# Patient Record
Sex: MALE | Race: WHITE
[De-identification: names, ages, dates, MRNs, and addresses within clinical notes are randomized per-mention and may not be internally consistent; named-entity substitution may affect disease eponyms.]

---

## 2017-04-06 ENCOUNTER — HOSPITAL ENCOUNTER (OUTPATIENT)
Dept: HOSPITAL 47 - LABWHC1 | Age: 72
Discharge: HOME | End: 2017-04-06
Attending: NURSE PRACTITIONER
Payer: MEDICARE

## 2017-04-06 DIAGNOSIS — E21.3: ICD-10-CM

## 2017-04-06 DIAGNOSIS — N39.0: ICD-10-CM

## 2017-04-06 DIAGNOSIS — M10.9: ICD-10-CM

## 2017-04-06 DIAGNOSIS — D64.9: ICD-10-CM

## 2017-04-06 DIAGNOSIS — N18.3: Primary | ICD-10-CM

## 2017-04-06 LAB
ANION GAP SERPL CALC-SCNC: 11 MMOL/L
BASOPHILS # BLD AUTO: 0.1 K/UL (ref 0–0.2)
BASOPHILS NFR BLD AUTO: 1 %
BUN SERPL-SCNC: 34 MG/DL (ref 9–20)
CALCIUM SPEC-MCNC: 8.8 MG/DL (ref 8.4–10.2)
CH: 32.9
CHCM: 31.5
CHLORIDE SERPL-SCNC: 106 MMOL/L (ref 98–107)
CO2 SERPL-SCNC: 29 MMOL/L (ref 22–30)
EOSINOPHIL # BLD AUTO: 0.3 K/UL (ref 0–0.7)
EOSINOPHIL NFR BLD AUTO: 4 %
ERYTHROCYTE [DISTWIDTH] IN BLOOD BY AUTOMATED COUNT: 3.83 M/UL (ref 4.3–5.9)
ERYTHROCYTE [DISTWIDTH] IN BLOOD: 15.9 % (ref 11.5–15.5)
GLUCOSE SERPL-MCNC: 145 MG/DL (ref 74–99)
HCT VFR BLD AUTO: 40.4 % (ref 39–53)
HDW: 2.98
HGB BLD-MCNC: 13.1 GM/DL (ref 13–17.5)
IRON SERPL-MCNC: 116 UG/DL (ref 49–181)
LUC NFR BLD AUTO: 4 %
LYMPHOCYTES # SPEC AUTO: 2.5 K/UL (ref 1–4.8)
LYMPHOCYTES NFR SPEC AUTO: 28 %
MAGNESIUM SPEC-SCNC: 2 MG/DL (ref 1.6–2.3)
MCH RBC QN AUTO: 34.2 PG (ref 25–35)
MCHC RBC AUTO-ENTMCNC: 32.4 G/DL (ref 31–37)
MCV RBC AUTO: 105.3 FL (ref 80–100)
MONOCYTES # BLD AUTO: 0.4 K/UL (ref 0–1)
MONOCYTES NFR BLD AUTO: 5 %
NEUTROPHILS # BLD AUTO: 5.5 K/UL (ref 1.3–7.7)
NEUTROPHILS NFR BLD AUTO: 60 %
NON-AFRICAN AMERICAN GFR(MDRD): 46
PH UR: 5.5 [PH] (ref 5–8)
PHOSPHATE SERPL-MCNC: 4 MG/DL (ref 2.5–4.5)
POTASSIUM SERPL-SCNC: 4.7 MMOL/L (ref 3.5–5.1)
SODIUM SERPL-SCNC: 146 MMOL/L (ref 137–145)
SP GR UR: 1.02 (ref 1–1.03)
TIBC SERPL-MCNC: 326 UG/DL (ref 261–462)
UA BILLING (MACRO VS. MICRO): (no result)
URATE SERPL-MCNC: 4.6 MG/DL (ref 3.5–8.5)
UROBILINOGEN UR QL STRIP: <2 MG/DL (ref ?–2)
WBC # BLD AUTO: 0.33 10*3/UL
WBC # BLD AUTO: 9.2 K/UL (ref 3.8–10.6)
WBC (PEROX): 9.73

## 2017-04-06 PROCEDURE — 85025 COMPLETE CBC W/AUTO DIFF WBC: CPT

## 2017-04-06 PROCEDURE — 82306 VITAMIN D 25 HYDROXY: CPT

## 2017-04-06 PROCEDURE — 84100 ASSAY OF PHOSPHORUS: CPT

## 2017-04-06 PROCEDURE — 81003 URINALYSIS AUTO W/O SCOPE: CPT

## 2017-04-06 PROCEDURE — 82728 ASSAY OF FERRITIN: CPT

## 2017-04-06 PROCEDURE — 83970 ASSAY OF PARATHORMONE: CPT

## 2017-04-06 PROCEDURE — 36415 COLL VENOUS BLD VENIPUNCTURE: CPT

## 2017-04-06 PROCEDURE — 83550 IRON BINDING TEST: CPT

## 2017-04-06 PROCEDURE — 83540 ASSAY OF IRON: CPT

## 2017-04-06 PROCEDURE — 83735 ASSAY OF MAGNESIUM: CPT

## 2017-04-06 PROCEDURE — 84550 ASSAY OF BLOOD/URIC ACID: CPT

## 2017-04-06 PROCEDURE — 80048 BASIC METABOLIC PNL TOTAL CA: CPT

## 2018-03-08 ENCOUNTER — HOSPITAL ENCOUNTER (OUTPATIENT)
Dept: HOSPITAL 47 - LABWHC1 | Age: 73
Discharge: HOME | End: 2018-03-08
Attending: NURSE PRACTITIONER
Payer: MEDICARE

## 2018-03-08 DIAGNOSIS — M10.9: ICD-10-CM

## 2018-03-08 DIAGNOSIS — N18.3: Primary | ICD-10-CM

## 2018-03-08 DIAGNOSIS — D64.9: ICD-10-CM

## 2018-03-08 DIAGNOSIS — E21.3: ICD-10-CM

## 2018-03-08 DIAGNOSIS — N39.0: ICD-10-CM

## 2018-03-08 LAB
ANION GAP SERPL CALC-SCNC: 11 MMOL/L
BASOPHILS # BLD AUTO: 0.1 K/UL (ref 0–0.2)
BASOPHILS NFR BLD AUTO: 1 %
BUN SERPL-SCNC: 48 MG/DL (ref 9–20)
CALCIUM SPEC-MCNC: 8.7 MG/DL (ref 8.4–10.2)
CHLORIDE SERPL-SCNC: 104 MMOL/L (ref 98–107)
CO2 SERPL-SCNC: 30 MMOL/L (ref 22–30)
EOSINOPHIL # BLD AUTO: 0.3 K/UL (ref 0–0.7)
EOSINOPHIL NFR BLD AUTO: 4 %
ERYTHROCYTE [DISTWIDTH] IN BLOOD BY AUTOMATED COUNT: 3.95 M/UL (ref 4.3–5.9)
ERYTHROCYTE [DISTWIDTH] IN BLOOD: 15 % (ref 11.5–15.5)
GLUCOSE SERPL-MCNC: 119 MG/DL (ref 74–99)
HCT VFR BLD AUTO: 40 % (ref 39–53)
HGB BLD-MCNC: 13.3 GM/DL (ref 13–17.5)
LYMPHOCYTES # SPEC AUTO: 2.5 K/UL (ref 1–4.8)
LYMPHOCYTES NFR SPEC AUTO: 31 %
MCH RBC QN AUTO: 33.6 PG (ref 25–35)
MCHC RBC AUTO-ENTMCNC: 33.2 G/DL (ref 31–37)
MCV RBC AUTO: 101.3 FL (ref 80–100)
MONOCYTES # BLD AUTO: 0.4 K/UL (ref 0–1)
MONOCYTES NFR BLD AUTO: 5 %
NEUTROPHILS # BLD AUTO: 4.4 K/UL (ref 1.3–7.7)
NEUTROPHILS NFR BLD AUTO: 55 %
PH UR: 5.5 [PH] (ref 5–8)
PLATELET # BLD AUTO: 256 K/UL (ref 150–450)
POTASSIUM SERPL-SCNC: 4.5 MMOL/L (ref 3.5–5.1)
PTH-INTACT SERPL-MCNC: 149.8 PG/ML (ref 14–72)
SODIUM SERPL-SCNC: 145 MMOL/L (ref 137–145)
SP GR UR: 1.02 (ref 1–1.03)
URATE SERPL-MCNC: 5.1 MG/DL (ref 3.5–8.5)
UROBILINOGEN UR QL STRIP: <2 MG/DL (ref ?–2)
WBC # BLD AUTO: 8 K/UL (ref 3.8–10.6)

## 2018-03-08 PROCEDURE — 84100 ASSAY OF PHOSPHORUS: CPT

## 2018-03-08 PROCEDURE — 81003 URINALYSIS AUTO W/O SCOPE: CPT

## 2018-03-08 PROCEDURE — 80048 BASIC METABOLIC PNL TOTAL CA: CPT

## 2018-03-08 PROCEDURE — 36415 COLL VENOUS BLD VENIPUNCTURE: CPT

## 2018-03-08 PROCEDURE — 84550 ASSAY OF BLOOD/URIC ACID: CPT

## 2018-03-08 PROCEDURE — 83970 ASSAY OF PARATHORMONE: CPT

## 2018-03-08 PROCEDURE — 82728 ASSAY OF FERRITIN: CPT

## 2018-03-08 PROCEDURE — 83735 ASSAY OF MAGNESIUM: CPT

## 2018-03-08 PROCEDURE — 85025 COMPLETE CBC W/AUTO DIFF WBC: CPT

## 2018-03-08 PROCEDURE — 83550 IRON BINDING TEST: CPT

## 2018-03-08 PROCEDURE — 82306 VITAMIN D 25 HYDROXY: CPT

## 2018-03-08 PROCEDURE — 83540 ASSAY OF IRON: CPT

## 2018-04-11 ENCOUNTER — HOSPITAL ENCOUNTER (OUTPATIENT)
Dept: HOSPITAL 47 - OR | Age: 73
Discharge: HOME | End: 2018-04-11
Attending: OPHTHALMOLOGY
Payer: MEDICARE

## 2018-04-11 VITALS — BODY MASS INDEX: 35.5 KG/M2

## 2018-04-11 VITALS — RESPIRATION RATE: 16 BRPM | TEMPERATURE: 97 F

## 2018-04-11 VITALS — SYSTOLIC BLOOD PRESSURE: 149 MMHG | HEART RATE: 66 BPM | DIASTOLIC BLOOD PRESSURE: 79 MMHG

## 2018-04-11 DIAGNOSIS — Z79.84: ICD-10-CM

## 2018-04-11 DIAGNOSIS — E07.9: ICD-10-CM

## 2018-04-11 DIAGNOSIS — E78.5: ICD-10-CM

## 2018-04-11 DIAGNOSIS — H25.11: Primary | ICD-10-CM

## 2018-04-11 DIAGNOSIS — Z79.890: ICD-10-CM

## 2018-04-11 DIAGNOSIS — I25.2: ICD-10-CM

## 2018-04-11 DIAGNOSIS — N40.0: ICD-10-CM

## 2018-04-11 DIAGNOSIS — Z95.810: ICD-10-CM

## 2018-04-11 DIAGNOSIS — H43.813: ICD-10-CM

## 2018-04-11 DIAGNOSIS — H52.13: ICD-10-CM

## 2018-04-11 DIAGNOSIS — I11.9: ICD-10-CM

## 2018-04-11 DIAGNOSIS — H91.91: ICD-10-CM

## 2018-04-11 DIAGNOSIS — E11.9: ICD-10-CM

## 2018-04-11 DIAGNOSIS — Z79.01: ICD-10-CM

## 2018-04-11 DIAGNOSIS — I48.91: ICD-10-CM

## 2018-04-11 DIAGNOSIS — H25.041: ICD-10-CM

## 2018-04-11 DIAGNOSIS — Z79.899: ICD-10-CM

## 2018-04-11 DIAGNOSIS — K21.9: ICD-10-CM

## 2018-04-11 DIAGNOSIS — I25.10: ICD-10-CM

## 2018-04-11 DIAGNOSIS — Z87.891: ICD-10-CM

## 2018-04-11 LAB — GLUCOSE BLD-MCNC: 112 MG/DL (ref 75–99)

## 2018-04-11 RX ADMIN — PHENYLEPHRINE HYDROCHLORIDE NR DROPS: 25 SOLUTION/ DROPS OPHTHALMIC at 07:35

## 2018-04-11 RX ADMIN — TIMOLOL MALEATE ONE DROPS: 5 SOLUTION OPHTHALMIC at 08:14

## 2018-04-11 RX ADMIN — CYCLOPENTOLATE HYDROCHLORIDE ONE DROPS: 10 SOLUTION/ DROPS OPHTHALMIC at 07:11

## 2018-04-11 RX ADMIN — CYCLOPENTOLATE HYDROCHLORIDE ONE DROPS: 10 SOLUTION/ DROPS OPHTHALMIC at 07:00

## 2018-04-11 RX ADMIN — CYCLOPENTOLATE HYDROCHLORIDE ONE DROPS: 10 SOLUTION/ DROPS OPHTHALMIC at 07:25

## 2018-04-11 RX ADMIN — PHENYLEPHRINE HYDROCHLORIDE NR DROPS: 25 SOLUTION/ DROPS OPHTHALMIC at 07:03

## 2018-04-11 RX ADMIN — MOXIFLOXACIN ONE DROPS: 5 SOLUTION/ DROPS OPHTHALMIC at 08:29

## 2018-04-11 RX ADMIN — MOXIFLOXACIN ONE DROPS: 5 SOLUTION/ DROPS OPHTHALMIC at 08:13

## 2018-04-11 RX ADMIN — PHENYLEPHRINE HYDROCHLORIDE NR DROPS: 25 SOLUTION/ DROPS OPHTHALMIC at 07:17

## 2018-04-11 RX ADMIN — TIMOLOL MALEATE ONE DROPS: 5 SOLUTION OPHTHALMIC at 08:29

## 2018-04-11 NOTE — P.OP
Date of Procedure: 04/11/18


Preoperative Diagnosis: 


NS & PSC


Postoperative Diagnosis: 


same


Procedure(s) Performed: 


PIOL< OD


Implants: 


PCB00 22.00


Anesthesia: MAC


Surgeon: Nikita Dickerson


Estimated Blood Loss (ml): 0


Pathology: none sent


Condition: stable


Disposition: same day


Indications for Procedure: 


blurry vision


Operative Findings: 


No complications

## 2018-04-11 NOTE — OP
OPERATIVE REPORT



DATE OF SURGERY:

April 11, 2018.



OPERATION:

Phacoemulsification of cataract and intraocular lens implant of the right eye.



PREOPERATIVE DIAGNOSES:

Nuclear sclerosis. Posterior subcapsular cataract



POSTOPERATIVE DIAGNOSES:

Nuclear sclerosis.  Posterior subcapsular cataract.



OPERATION::

Clear cornea phacoemulsification of cataract right OD eye.



ESTIMATED BLOOD LOSS::

Zero.



SPECIMEN TAKEN::

None.



NARRATIVE::

After obtaining the appropriate consent, the patient was brought to the Operating Room

where the patient was placed under cardiac monitoring and prepped and draped in the

usual sterile manner.  At the 11 o'clock position a 15 degree super sharp blade was

used to create a paracentesis followed by instillation of 1% Xylocaine MPF 50:50 mix

with BSS into the anterior chamber.  This was followed by  Amvisc to stabilize the

anterior chamber.  At the  9 o'clock position a self-sealing corneal flap incision was

created using 2.8 mm gagandeep keratome.  A cystatome was used to initiate a continuous

tear capsulorrhexis which was completed with the Utrata forceps.  A Binkhorst cannula

was used to hydrodissect the lens nucleus followed by hydrodelineation.

Phacoemulsification of the lens was performed utilizing phacochop in 12.66 Seconds at

12 % power.  The remaining cortical material was removed using the irrigation

aspiration mode followed by additional 1% Xylocaine MPF into the anterior chamber

followed by viscoelastic to stabilize the capsular bag.  An JOHANNA PCB 00 22.0 diopter

posterior chamber lens was placed into the capsular bag without difficulty.  The

remaining viscoelastic material was removed from the anterior chamber with the

irrigation/aspiration.  Balanced salt solution was used to normalize the intraocular

pressure.  The incision was checked for watertight integrity.  The patient then

received two drops of 0.5% timolol followed by two drops Vigamox, was lightly patched

and shielded in the usual manner.  There were no complications from the procedure.  The

patient tolerated the procedure well and was returned to recovery in good condition.





MMODL / IJN: [id number] / Job#: [id number]

## 2018-05-21 ENCOUNTER — HOSPITAL ENCOUNTER (OUTPATIENT)
Dept: HOSPITAL 47 - RADECHMAIN | Age: 73
Discharge: HOME | End: 2018-05-21
Attending: FAMILY MEDICINE
Payer: MEDICARE

## 2018-05-21 DIAGNOSIS — I27.20: ICD-10-CM

## 2018-05-21 DIAGNOSIS — R42: ICD-10-CM

## 2018-05-21 DIAGNOSIS — I49.9: ICD-10-CM

## 2018-05-21 DIAGNOSIS — I08.3: Primary | ICD-10-CM

## 2018-05-21 PROCEDURE — 93880 EXTRACRANIAL BILAT STUDY: CPT

## 2018-05-21 PROCEDURE — 93306 TTE W/DOPPLER COMPLETE: CPT

## 2018-05-22 NOTE — ECHOF
Referral Reason:R42 dizziness, I49.9 arrythmia



MEASUREMENTS

--------

HEIGHT: 177.8 cm

WEIGHT: 115.2 kg

BP: 134/81

RVIDd:   3.3 cm     (< 3.3)

IVSd:   1.3 cm     (0.6 - 1.1)

LVIDd:   6.9 cm     (3.9 - 5.3)

LVPWd:   1.3 cm     (0.6 - 1.1)

IVSs:   1.7 cm

LVIDs:   6.0 cm

LVPWs:   1.6 cm

LAESV Index (A-L):   58.67 ml/m

Ao Diam:   3.9 cm     (2.0 - 3.7)

AV Cusp:   1.8 cm     (1.5 - 2.6)

LA Diam:   4.4 cm     (2.7 - 3.8)

MV E Shay:   1.10 m/s

MV DecT:   181 ms

MV A Shay:   0.36 m/s

MV E/A Ratio:   3.01 

RAP:   5.00 mmHg

RVSP:   50.65 mmHg







FINDINGS

--------

Undetermined rhythm.

This was a technically difficult study with suboptimal views.

The left ventricle is severely dilated.   There is mild concentric left ventricular hypertrophy.   Th
ere is severe global hypokinesis of LV .   Overall left ventricular systolic function is severely imp
aired with, an EF < 20%.

The right ventricle is mildly enlarged.

LA is severely dilated >40 ml/m2

RA appears enlarged.

3ml of Lumason was utilized for enhancement of images.

There is mild aortic valve sclerosis.   Trace amount of aortic regurgitation.    There is no evidence
 of aortic stenosis.

The mitral valve leaflets are mildly thickened.   Mild mitral annular calcification present.   Modera
te mitral regurgitation is present.

Mild tricuspid regurgitation present.   There is mild pulmonary hypertension.   The right ventricular
 systolic pressure, as measured by Doppler, is 50.65mmHg.

Trace/mild (physiologic)  pulmonic regurgitation.

The aortic root size is normal.

Normal inferior vena cava with less than 50% inspiratory collapse consistent with estimated right atr
ial pressure of 15 mmHg.

There is no pericardial effusion.



CONCLUSIONS

--------

1. Undetermined rhythm.

2. This was a technically difficult study with suboptimal views.

3. The left ventricle is severely dilated.

4. There is mild concentric left ventricular hypertrophy.

5. There is severe global hypokinesis of LV .

6. Overall left ventricular systolic function is severely impaired with, an EF < 20%.

7. The right ventricle is mildly enlarged.

8. LA is severely dilated >40 ml/m2

9. RA appears enlarged.

10. 3ml of Lumason was utilized for enhancement of images.

11. There is mild aortic valve sclerosis.

12. Trace amount of aortic regurgitation.

13. The mitral valve leaflets are mildly thickened.

14. Mild mitral annular calcification present.

15. Moderate mitral regurgitation is present.

16. Mild tricuspid regurgitation present.

17. There is mild pulmonary hypertension.

18. The right ventricular systolic pressure, as measured by Doppler, is 50.65mmHg.

19. Trace/mild (physiologic)  pulmonic regurgitation.

20. The aortic root size is normal.

21. Normal inferior vena cava with less than 50% inspiratory collapse consistent with estimated right
 atrial pressure of 15 mmHg.

22. There is no pericardial effusion.





SONOGRAPHER: Tony Ayala RDCS

## 2018-05-22 NOTE — US
EXAMINATION TYPE: US carotid duplex BILAT

 

DATE OF EXAM: 5/21/2018

 

COMPARISON: NONE

 

CLINICAL HISTORY: R42 dizziness, I49.9 arrythmia. Dizziness, abnormal gait

 

EXAM MEASUREMENTS: 

 

RIGHT:  Peak Systolic Velocity (PSV) cm/sec

----- Right CCA:  41.8  

---- Right ICA:  70.2  

----- Right ECA:  57.5

ICA/CCA ratio:  1.7  

 

RIGHT:  End Diastole cm/sec

----- Right CCA:  13.0

----- Right ICA:  31.8   

----- Right ECA:  6.9   

 

LEFT:  Peak Systolic Velocity (PSV) cm/sec

----- Left CCA:  58.8

----- Left ICA:  85.6

----- Left ECA:  58.4

ICA/CCA ratio:  1.5

 

LEFT:  End Diastole cm/sec

----- Left CCA:  20.1

----- Left ICA:  36.2

----- Left ECA:  9.5

 

VERTEBRALS (direction of flow):

Right Vertebral: Antegrade

Left Vertebral: Antegrade

 

Rhythm:  Arrhythmia

 

Mild Heterogeneous plaque carotid bifurcations bilaterally with no significant stenosis seen.

 

 

 

IMPRESSION:  

1. No suspicious elevated velocity to suggest significant flow-limiting stenosis.

2. Note is made of cardiac arrhythmia during the examination.   

 

 

Criteria for Assigning % of Stenosis / Diameter reduction

(Estimation based on the indirect measurements of the internal carotid artery velocities (ICA PSV).

1.  Normal (no stenosis)=ICA PSV < 125 cm/s: ratio < 2.0: ICA EDV<40 cm/s.

2. Less than 50% stenosis=ICA PSV < 125 cm/s: ratio < 2.0: ICA EDV<40 cm/s.

3.  50 to 69% stenosis=ICA PSV of 125 to 230 cm/s: ration 2.0 ? 4.0: ICA EDV  cm/s.

4.  Greater than 70% stenosis to near occlusion= ICA PSV > 230 cm/s: ratio > 4.0: ICA EDV > 100 cm/s.
 

5.  Near occlusion= ICA PSV velocities may be low or undetectable: variable ratio and ICA EDV.

6.  Total occlusion=unable to detect flow.

## 2018-07-03 ENCOUNTER — HOSPITAL ENCOUNTER (OUTPATIENT)
Dept: HOSPITAL 47 - RADCTMAIN | Age: 73
Discharge: HOME | End: 2018-07-03
Payer: MEDICARE

## 2018-07-03 DIAGNOSIS — G31.1: Primary | ICD-10-CM

## 2018-07-03 PROCEDURE — 70450 CT HEAD/BRAIN W/O DYE: CPT

## 2018-07-03 NOTE — CT
EXAMINATION TYPE: CT brain wo con

 

DATE OF EXAM: 7/3/2018

 

COMPARISON: NONE

 

HISTORY: Tremors and weakness

 

CT DLP: 1242 mGycm

Automated exposure control for dose reduction was used.

 

FINDINGS: 

Nasal septal deviation noted.

Intracranial atherosclerotic changes noted.

Mild to moderate generalized degenerative change.

No acute hemorrhage or mass effect.

Calvarium intact. Changes of chronic sinusitis with severe changes involving the maxillary sinus note
d on the right.

 

IMPRESSION: 

MILD TO MODERATE DEGENERATIVE CHANGES.

## 2018-07-09 ENCOUNTER — HOSPITAL ENCOUNTER (OUTPATIENT)
Dept: HOSPITAL 47 - LABWHC1 | Age: 73
Discharge: HOME | End: 2018-07-09
Payer: MEDICARE

## 2018-07-09 DIAGNOSIS — E55.9: ICD-10-CM

## 2018-07-09 DIAGNOSIS — N18.3: ICD-10-CM

## 2018-07-09 DIAGNOSIS — N39.0: ICD-10-CM

## 2018-07-09 DIAGNOSIS — E21.3: ICD-10-CM

## 2018-07-09 DIAGNOSIS — N25.81: Primary | ICD-10-CM

## 2018-07-09 DIAGNOSIS — M10.9: ICD-10-CM

## 2018-07-09 DIAGNOSIS — D63.1: ICD-10-CM

## 2018-07-09 LAB
ALBUMIN SERPL-MCNC: 4.1 G/DL (ref 3.5–5)
ANION GAP SERPL CALC-SCNC: 11 MMOL/L
BASOPHILS # BLD AUTO: 0 K/UL (ref 0–0.2)
BASOPHILS NFR BLD AUTO: 1 %
BUN SERPL-SCNC: 47 MG/DL (ref 9–20)
CALCIUM SPEC-MCNC: 9.2 MG/DL (ref 8.4–10.2)
CHLORIDE SERPL-SCNC: 102 MMOL/L (ref 98–107)
CO2 SERPL-SCNC: 34 MMOL/L (ref 22–30)
EOSINOPHIL # BLD AUTO: 0.2 K/UL (ref 0–0.7)
EOSINOPHIL NFR BLD AUTO: 2 %
ERYTHROCYTE [DISTWIDTH] IN BLOOD BY AUTOMATED COUNT: 4.22 M/UL (ref 4.3–5.9)
ERYTHROCYTE [DISTWIDTH] IN BLOOD: 15.2 % (ref 11.5–15.5)
GLUCOSE SERPL-MCNC: 120 MG/DL (ref 74–99)
HCT VFR BLD AUTO: 43.1 % (ref 39–53)
HGB BLD-MCNC: 13.9 GM/DL (ref 13–17.5)
LYMPHOCYTES # SPEC AUTO: 2.8 K/UL (ref 1–4.8)
LYMPHOCYTES NFR SPEC AUTO: 32 %
MAGNESIUM SPEC-SCNC: 2.1 MG/DL (ref 1.6–2.3)
MCH RBC QN AUTO: 33 PG (ref 25–35)
MCHC RBC AUTO-ENTMCNC: 32.3 G/DL (ref 31–37)
MCV RBC AUTO: 102.1 FL (ref 80–100)
MONOCYTES # BLD AUTO: 0.5 K/UL (ref 0–1)
MONOCYTES NFR BLD AUTO: 5 %
NEUTROPHILS # BLD AUTO: 5 K/UL (ref 1.3–7.7)
NEUTROPHILS NFR BLD AUTO: 57 %
PH UR: 5.5 [PH] (ref 5–8)
PLATELET # BLD AUTO: 223 K/UL (ref 150–450)
POTASSIUM SERPL-SCNC: 4.2 MMOL/L (ref 3.5–5.1)
SODIUM SERPL-SCNC: 147 MMOL/L (ref 137–145)
SP GR UR: 1.01 (ref 1–1.03)
URATE SERPL-MCNC: 4.8 MG/DL (ref 3.5–8.5)
UROBILINOGEN UR QL STRIP: <2 MG/DL (ref ?–2)
WBC # BLD AUTO: 8.8 K/UL (ref 3.8–10.6)

## 2018-07-09 PROCEDURE — 81003 URINALYSIS AUTO W/O SCOPE: CPT

## 2018-07-09 PROCEDURE — 82040 ASSAY OF SERUM ALBUMIN: CPT

## 2018-07-09 PROCEDURE — 85025 COMPLETE CBC W/AUTO DIFF WBC: CPT

## 2018-07-09 PROCEDURE — 83735 ASSAY OF MAGNESIUM: CPT

## 2018-07-09 PROCEDURE — 84550 ASSAY OF BLOOD/URIC ACID: CPT

## 2018-07-09 PROCEDURE — 82306 VITAMIN D 25 HYDROXY: CPT

## 2018-07-09 PROCEDURE — 83550 IRON BINDING TEST: CPT

## 2018-07-09 PROCEDURE — 83540 ASSAY OF IRON: CPT

## 2018-07-09 PROCEDURE — 82728 ASSAY OF FERRITIN: CPT

## 2018-07-09 PROCEDURE — 36415 COLL VENOUS BLD VENIPUNCTURE: CPT

## 2018-07-09 PROCEDURE — 80048 BASIC METABOLIC PNL TOTAL CA: CPT

## 2018-07-09 PROCEDURE — 83970 ASSAY OF PARATHORMONE: CPT

## 2018-07-09 PROCEDURE — 84100 ASSAY OF PHOSPHORUS: CPT

## 2018-07-10 LAB — PTH-INTACT SERPL-MCNC: 78.6 PG/ML (ref 14–72)

## 2018-11-15 ENCOUNTER — HOSPITAL ENCOUNTER (INPATIENT)
Dept: HOSPITAL 47 - EC | Age: 73
LOS: 4 days | Discharge: HOME | DRG: 291 | End: 2018-11-19
Attending: FAMILY MEDICINE | Admitting: FAMILY MEDICINE
Payer: MEDICARE

## 2018-11-15 DIAGNOSIS — K21.9: ICD-10-CM

## 2018-11-15 DIAGNOSIS — R09.02: ICD-10-CM

## 2018-11-15 DIAGNOSIS — N17.9: ICD-10-CM

## 2018-11-15 DIAGNOSIS — Z95.810: ICD-10-CM

## 2018-11-15 DIAGNOSIS — E11.22: ICD-10-CM

## 2018-11-15 DIAGNOSIS — E07.9: ICD-10-CM

## 2018-11-15 DIAGNOSIS — I50.23: ICD-10-CM

## 2018-11-15 DIAGNOSIS — I25.10: ICD-10-CM

## 2018-11-15 DIAGNOSIS — E83.89: ICD-10-CM

## 2018-11-15 DIAGNOSIS — Z79.890: ICD-10-CM

## 2018-11-15 DIAGNOSIS — I25.2: ICD-10-CM

## 2018-11-15 DIAGNOSIS — J20.9: ICD-10-CM

## 2018-11-15 DIAGNOSIS — E78.5: ICD-10-CM

## 2018-11-15 DIAGNOSIS — Z79.84: ICD-10-CM

## 2018-11-15 DIAGNOSIS — I42.9: ICD-10-CM

## 2018-11-15 DIAGNOSIS — I13.0: Primary | ICD-10-CM

## 2018-11-15 DIAGNOSIS — T50.2X5A: ICD-10-CM

## 2018-11-15 DIAGNOSIS — N40.0: ICD-10-CM

## 2018-11-15 DIAGNOSIS — N18.3: ICD-10-CM

## 2018-11-15 DIAGNOSIS — F41.9: ICD-10-CM

## 2018-11-15 DIAGNOSIS — Z79.01: ICD-10-CM

## 2018-11-15 DIAGNOSIS — Z96.1: ICD-10-CM

## 2018-11-15 DIAGNOSIS — Z98.42: ICD-10-CM

## 2018-11-15 DIAGNOSIS — J44.0: ICD-10-CM

## 2018-11-15 DIAGNOSIS — J18.9: ICD-10-CM

## 2018-11-15 DIAGNOSIS — Z79.899: ICD-10-CM

## 2018-11-15 DIAGNOSIS — Z87.891: ICD-10-CM

## 2018-11-15 DIAGNOSIS — Z95.5: ICD-10-CM

## 2018-11-15 LAB
ALBUMIN SERPL-MCNC: 4.3 G/DL (ref 3.5–5)
ALP SERPL-CCNC: 91 U/L (ref 38–126)
ALT SERPL-CCNC: 36 U/L (ref 21–72)
ANION GAP SERPL CALC-SCNC: 10 MMOL/L
APTT BLD: 28.4 SEC (ref 22–30)
AST SERPL-CCNC: 24 U/L (ref 17–59)
BUN SERPL-SCNC: 49 MG/DL (ref 9–20)
CALCIUM SPEC-MCNC: 9.2 MG/DL (ref 8.4–10.2)
CELLS COUNTED: 100
CHLORIDE SERPL-SCNC: 103 MMOL/L (ref 98–107)
CK SERPL-CCNC: 38 U/L (ref 55–170)
CO2 SERPL-SCNC: 28 MMOL/L (ref 22–30)
ERYTHROCYTE [DISTWIDTH] IN BLOOD BY AUTOMATED COUNT: 4.24 M/UL (ref 4.3–5.9)
ERYTHROCYTE [DISTWIDTH] IN BLOOD: 15.5 % (ref 11.5–15.5)
GLUCOSE BLD-MCNC: 104 MG/DL (ref 75–99)
GLUCOSE BLD-MCNC: 129 MG/DL (ref 75–99)
GLUCOSE BLD-MCNC: 154 MG/DL (ref 75–99)
GLUCOSE BLD-MCNC: 286 MG/DL (ref 75–99)
GLUCOSE SERPL-MCNC: 121 MG/DL (ref 74–99)
HBA1C MFR BLD: 6.1 % (ref 4–6)
HCT VFR BLD AUTO: 44.2 % (ref 39–53)
HGB BLD-MCNC: 14.1 GM/DL (ref 13–17.5)
INR PPP: 2.1 (ref ?–1.2)
INR PPP: 2.5 (ref ?–1.2)
LYMPHOCYTES # BLD MANUAL: 3.39 K/UL (ref 1–4.8)
MAGNESIUM SPEC-SCNC: 1.9 MG/DL (ref 1.6–2.3)
MCH RBC QN AUTO: 33.2 PG (ref 25–35)
MCHC RBC AUTO-ENTMCNC: 31.9 G/DL (ref 31–37)
MCV RBC AUTO: 104.2 FL (ref 80–100)
MONOCYTES # BLD MANUAL: 1.82 K/UL (ref 0–1)
NEUTROPHILS NFR BLD MANUAL: 57 %
NEUTS SEG # BLD MANUAL: 6.9 K/UL (ref 1.3–7.7)
PH UR: 5 [PH] (ref 5–8)
PLATELET # BLD AUTO: 277 K/UL (ref 150–450)
POTASSIUM SERPL-SCNC: 4.2 MMOL/L (ref 3.5–5.1)
PROT SERPL-MCNC: 7.7 G/DL (ref 6.3–8.2)
PT BLD: 19.1 SEC (ref 9–12)
PT BLD: 22.1 SEC (ref 9–12)
SODIUM SERPL-SCNC: 141 MMOL/L (ref 137–145)
SP GR UR: 1.01 (ref 1–1.03)
TROPONIN I SERPL-MCNC: 0.02 NG/ML (ref 0–0.03)
UROBILINOGEN UR QL STRIP: <2 MG/DL (ref ?–2)
WBC # BLD AUTO: 12.1 K/UL (ref 3.8–10.6)

## 2018-11-15 PROCEDURE — 99291 CRITICAL CARE FIRST HOUR: CPT

## 2018-11-15 PROCEDURE — 81003 URINALYSIS AUTO W/O SCOPE: CPT

## 2018-11-15 PROCEDURE — 80048 BASIC METABOLIC PNL TOTAL CA: CPT

## 2018-11-15 PROCEDURE — 94640 AIRWAY INHALATION TREATMENT: CPT

## 2018-11-15 PROCEDURE — 36415 COLL VENOUS BLD VENIPUNCTURE: CPT

## 2018-11-15 PROCEDURE — 85610 PROTHROMBIN TIME: CPT

## 2018-11-15 PROCEDURE — 71046 X-RAY EXAM CHEST 2 VIEWS: CPT

## 2018-11-15 PROCEDURE — 83735 ASSAY OF MAGNESIUM: CPT

## 2018-11-15 PROCEDURE — 94760 N-INVAS EAR/PLS OXIMETRY 1: CPT

## 2018-11-15 PROCEDURE — 87040 BLOOD CULTURE FOR BACTERIA: CPT

## 2018-11-15 PROCEDURE — 82550 ASSAY OF CK (CPK): CPT

## 2018-11-15 PROCEDURE — 82553 CREATINE MB FRACTION: CPT

## 2018-11-15 PROCEDURE — 84484 ASSAY OF TROPONIN QUANT: CPT

## 2018-11-15 PROCEDURE — 85730 THROMBOPLASTIN TIME PARTIAL: CPT

## 2018-11-15 PROCEDURE — 83880 ASSAY OF NATRIURETIC PEPTIDE: CPT

## 2018-11-15 PROCEDURE — 85025 COMPLETE CBC W/AUTO DIFF WBC: CPT

## 2018-11-15 PROCEDURE — 80053 COMPREHEN METABOLIC PANEL: CPT

## 2018-11-15 PROCEDURE — 85027 COMPLETE CBC AUTOMATED: CPT

## 2018-11-15 PROCEDURE — 83036 HEMOGLOBIN GLYCOSYLATED A1C: CPT

## 2018-11-15 RX ADMIN — HYDROCHLOROTHIAZIDE SCH MG: 25 TABLET ORAL at 09:34

## 2018-11-15 RX ADMIN — WARFARIN SODIUM SCH MG: 2.5 TABLET ORAL at 18:40

## 2018-11-15 RX ADMIN — NITROGLYCERIN SCH MG: 2.5 CAPSULE ORAL at 21:24

## 2018-11-15 RX ADMIN — LEVOTHYROXINE SODIUM SCH MCG: 25 TABLET ORAL at 09:33

## 2018-11-15 RX ADMIN — FAMOTIDINE SCH MG: 20 TABLET, FILM COATED ORAL at 09:34

## 2018-11-15 RX ADMIN — IPRATROPIUM BROMIDE AND ALBUTEROL SULFATE SCH ML: .5; 3 SOLUTION RESPIRATORY (INHALATION) at 15:22

## 2018-11-15 RX ADMIN — FUROSEMIDE SCH MG: 10 INJECTION, SOLUTION INTRAMUSCULAR; INTRAVENOUS at 09:32

## 2018-11-15 RX ADMIN — IPRATROPIUM BROMIDE AND ALBUTEROL SULFATE SCH ML: .5; 3 SOLUTION RESPIRATORY (INHALATION) at 07:03

## 2018-11-15 RX ADMIN — NITROGLYCERIN SCH MG: 6.5 CAPSULE ORAL at 21:24

## 2018-11-15 RX ADMIN — METOPROLOL TARTRATE SCH MG: 50 TABLET, FILM COATED ORAL at 09:33

## 2018-11-15 RX ADMIN — ATORVASTATIN CALCIUM SCH MG: 20 TABLET, FILM COATED ORAL at 09:33

## 2018-11-15 RX ADMIN — IPRATROPIUM BROMIDE AND ALBUTEROL SULFATE SCH ML: .5; 3 SOLUTION RESPIRATORY (INHALATION) at 11:17

## 2018-11-15 RX ADMIN — ALLOPURINOL SCH MG: 300 TABLET ORAL at 09:33

## 2018-11-15 RX ADMIN — THERA TABS SCH EACH: TAB at 09:32

## 2018-11-15 RX ADMIN — NITROGLYCERIN SCH MG: 6.5 CAPSULE ORAL at 10:25

## 2018-11-15 RX ADMIN — NITROGLYCERIN SCH MG: 2.5 CAPSULE ORAL at 10:25

## 2018-11-15 RX ADMIN — POTASSIUM CHLORIDE SCH MEQ: 20 TABLET, EXTENDED RELEASE ORAL at 09:33

## 2018-11-15 RX ADMIN — LISINOPRIL SCH MG: 20 TABLET ORAL at 21:24

## 2018-11-15 RX ADMIN — INSULIN ASPART SCH: 100 INJECTION, SOLUTION INTRAVENOUS; SUBCUTANEOUS at 17:11

## 2018-11-15 RX ADMIN — INSULIN ASPART SCH UNIT: 100 INJECTION, SOLUTION INTRAVENOUS; SUBCUTANEOUS at 12:58

## 2018-11-15 RX ADMIN — IPRATROPIUM BROMIDE AND ALBUTEROL SULFATE SCH ML: .5; 3 SOLUTION RESPIRATORY (INHALATION) at 19:56

## 2018-11-15 RX ADMIN — INSULIN ASPART SCH: 100 INJECTION, SOLUTION INTRAVENOUS; SUBCUTANEOUS at 21:11

## 2018-11-15 RX ADMIN — LISINOPRIL SCH MG: 20 TABLET ORAL at 09:32

## 2018-11-15 RX ADMIN — FINASTERIDE SCH MG: 5 TABLET, FILM COATED ORAL at 09:32

## 2018-11-15 RX ADMIN — METOPROLOL TARTRATE SCH MG: 50 TABLET, FILM COATED ORAL at 21:24

## 2018-11-15 NOTE — XR
EXAMINATION TYPE: XR chest 2V

 

DATE OF EXAM: 11/15/2018

 

COMPARISON: 5/20/2013

 

HISTORY: Dyspnea

 

TECHNIQUE:  Frontal and lateral views of the chest are obtained.

 

FINDINGS:  Heart appears enlarged. There is no heart failure. There is left axillary pacemaker with t
he lead tips in the right ventricle. There is no pleural effusion. Bony thorax appears intact.

 

IMPRESSION:  Mild cardiomegaly. No active cardiopulmonary disease. No heart failure. No significant c
hange compared to old exam.

## 2018-11-15 NOTE — ED
SOB HPI





- General


Stated Complaint: Shortness of Breath


Time Seen by Provider: 11/15/18 00:09





- History of Present Illness


Initial Comments: 





This is a 73-year-old male the ER with persistent shortness of breath.  

Multiple underlying comorbidities heart disease and COPD.  Patient's brought in 

by EMS, currently given breathing treatment, poor strain.  Patient per EMS is 

been significantly improving after breathing treatment.  Patient denies fever 

or chest pain.


MD Complaint: shortness of breath, cough


-: days(s)


Severity: moderate


Consistency: constant


Improves With: oxygen, bronchodilators


Worsens With: lying flat, exertion, movement


Known History Of: COPD, congestive heart failure


Context: recent URI


Associated Symptoms: chest pain, cough


Treatments Prior to Arrival: none





- Related Data


 Home Medications











 Medication  Instructions  Recorded  Confirmed


 


Allopurinol [Zyloprim] 300 mg PO DAILY 04/09/18 11/15/18


 


Atorvastatin [Lipitor] 20 mg PO DAILY 04/09/18 11/15/18


 


Benazepril HCl 20 mg PO BID 04/09/18 11/15/18


 


Calcitriol 0.25 mcg PO Q7D 04/09/18 11/15/18


 


Famotidine [Pepcid] 20 mg PO DAILY 04/09/18 11/15/18


 


Finasteride [Proscar] 5 mg PO DAILY 04/09/18 11/15/18


 


Furosemide [Lasix] 40 mg PO DAILY 04/09/18 11/15/18


 


Hydrochlorothiazide 25 mg PO DAILY 04/09/18 11/15/18


 


Levothyroxine Sodium [Synthroid] 25 mcg PO DAILY 04/09/18 11/15/18


 


Metoprolol Tartrate [Lopressor] 150 mg PO BID 04/09/18 11/15/18


 


Multivitamin [Men's Multi-Vitamin] 1 tab PO DAILY 04/09/18 11/15/18


 


Nitroglycerin 9 mg PO DAILY 04/09/18 11/15/18


 


Potassium Chloride [Klor-Con 20] 20 meq PO DAILY 04/09/18 11/15/18


 


Warfarin Sodium 5 mg PO DAILY 04/09/18 11/15/18


 


glipiZIDE [Glucotrol] 2.5 mg PO BID 04/09/18 11/15/18


 


hydrALAZINE HCL 50 mg PO DAILY 04/09/18 11/15/18











 Allergies











Allergy/AdvReac Type Severity Reaction Status Date / Time


 


No Known Allergies Allergy   Verified 11/15/18 00:27














Review of Systems


ROS Statement: 


Those systems with pertinent positive or pertinent negative responses have been 

documented in the HPI.





ROS Other: All systems not noted in ROS Statement are negative.





Past Medical History


Past Medical History: Diabetes Mellitus, Eye Disorder, GERD/Reflux, 

Hyperlipidemia, Hypertension, Myocardial Infarction (MI), Thyroid Disorder


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 

CATARACT.  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 

resolved


Last Myocardial Infarction Date:: 1993


History of Any Multi-Drug Resistant Organisms: None Reported


Past Surgical History: AICD, Heart Catheterization With Stent, Pacemaker


Additional Past Surgical History / Comment(s): SEVERAL CARDIOVERSION.  LT 

CATARACT REMOVED


Past Anesthesia/Blood Transfusion Reactions: No Reported Reaction


Date of Last Stent Placement:: 1993


Type of Cardiac Device: Permanent Pacemaker, AICD


Device Placement Date:: 1994, 11/2010


Smoking Status: Former smoker





- Past Family History


  ** Mother


Additional Family Medical History / Comment(s): SEVERE BLE LYMPH EDEMA





General Exam


General appearance: alert, in no apparent distress


Head exam: Present: atraumatic, normocephalic, normal inspection


Eye exam: Present: normal appearance, PERRL, EOMI.  Absent: scleral icterus, 

conjunctival injection, periorbital swelling


ENT exam: Present: normal exam, mucous membranes moist


Neck exam: Present: normal inspection.  Absent: tenderness, meningismus, 

lymphadenopathy


Respiratory exam: Present: normal lung sounds bilaterally.  Absent: respiratory 

distress, wheezes, rales, rhonchi, stridor


Cardiovascular Exam: Present: regular rate, normal rhythm, normal heart sounds.

  Absent: systolic murmur, diastolic murmur, rubs, gallop, clicks


GI/Abdominal exam: Present: soft, normal bowel sounds.  Absent: distended, 

tenderness, guarding, rebound, rigid


Extremities exam: Present: normal inspection, full ROM, normal capillary 

refill.  Absent: tenderness, pedal edema, joint swelling, calf tenderness


Back exam: Present: normal inspection


Neurological exam: Present: alert, oriented X3, CN II-XII intact


Psychiatric exam: Present: normal affect, normal mood


Skin exam: Present: warm, dry, intact, normal color.  Absent: rash





Course


 Vital Signs











  11/15/18





  00:17


 


Temperature 98.1 F


 


Pulse Rate 86


 


Respiratory 16





Rate 


 


Blood Pressure 150/88


 


O2 Sat by Pulse 100





Oximetry 














Medical Decision Making





- Medical Decision Making





73 male the ER for evaluation shortness of cough congestion, hypoxia, patient 

be admitted for continued breathing treatments, IV antibiotics





- Lab Data


Result diagrams: 


 11/15/18 00:33





 Lab Results











  11/15/18 Range/Units





  00:33 


 


WBC  12.1 H  (3.8-10.6)  k/uL


 


RBC  4.24 L  (4.30-5.90)  m/uL


 


Hgb  14.1  (13.0-17.5)  gm/dL


 


Hct  44.2  (39.0-53.0)  %


 


MCV  104.2 H  (80.0-100.0)  fL


 


MCH  33.2  (25.0-35.0)  pg


 


MCHC  31.9  (31.0-37.0)  g/dL


 


RDW  15.5  (11.5-15.5)  %


 


Plt Count  277  (150-450)  k/uL


 


Neutrophils % (Manual)  57  %


 


Lymphocytes % (Manual)  28  %


 


Monocytes % (Manual)  15  %


 


Neutrophils # (Manual)  6.90  (1.3-7.7)  k/uL


 


Lymphocytes # (Manual)  3.39  (1.0-4.8)  k/uL


 


Monocytes # (Manual)  1.82 H  (0-1.0)  k/uL


 


Nucleated RBCs  0  (0-0)  /100 WBC


 


Manual Slide Review  Performed  


 


Poikilocytosis (manual  Present  


 


Anisocytosis (manual)  Present  


 


Macrocytosis  Moderate  


 


Ovalocytes  Present  














- EKG Data


-: EKG Interpreted by Me (EKG shows A. fib rate of 85, , QTc 506)





- Radiology Data


Radiology results: report reviewed (Chest x-rays negative for acute disease), 

image reviewed





Critical Care Time


Critical Care Time: Yes


Total Critical Care Time: 31





Disposition


Clinical Impression: 


 Community acquired pneumonia, Acute bronchitis, Hypoxia





Disposition: ADMITTED AS IP TO THIS HOSP


Condition: Fair


Is patient prescribed a controlled substance at d/c from ED?: No


Referrals: 


Simone Villalta MD [Primary Care Provider] - 1-2 days

## 2018-11-15 NOTE — P.HPIM
History of Present Illness


H&P Date: 11/15/18


Chief Complaint: Shortness of breath.





This is a history and physical on a 73-year-old white male with history of 

heart failure who had sudden onset of shortness of breath.  No edema was 

worsening and he did double up on his furosemide.  His edema did not get any 

worse but his shortness of breath did slowly become worse.  Dyspnea on exertion 

was noted and he was appropriately taken to the hospital.  Evaluation did not 

show overt heart failure but more related acquired pneumonia symptomatology.  

The patient is now admitted for such.  No voiding difficulties.  No significant 

nausea, vomiting or diarrhea.





Review of Systems


Constitutional: Reports weakness, Denies chills, Denies fever


Eyes: denies blurred vision, denies pain


Ears, nose, mouth and throat: Denies headache, Denies sore throat


Cardiovascular: Reports dyspnea on exertion


Respiratory: Denies cough


Gastrointestinal: Denies abdominal pain, Denies diarrhea, Denies nausea, Denies 

vomiting


Musculoskeletal: Denies myalgias


Neurological: Denies numbness, Denies weakness


Psychiatric: Denies anxiety, Denies depression


Endocrine: Denies fatigue, Denies weight change





Past Medical History


Past Medical History: Diabetes Mellitus, Eye Disorder, GERD/Reflux, 

Hyperlipidemia, Hypertension, Myocardial Infarction (MI), Thyroid Disorder


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 

CATARACT.  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 

resolved


Last Myocardial Infarction Date:: 1993


History of Any Multi-Drug Resistant Organisms: None Reported


Past Surgical History: AICD, Heart Catheterization With Stent, Pacemaker


Additional Past Surgical History / Comment(s): SEVERAL CARDIOVERSION.  LT 

CATARACT REMOVED


Past Anesthesia/Blood Transfusion Reactions: No Reported Reaction


Date of Last Stent Placement:: 1993


Type of Cardiac Device: Permanent Pacemaker, AICD


Device Placement Date:: 1994, 11/2010


Past Psychological History: Anxiety


Smoking Status: Former smoker


Past Alcohol Use History: Rare


Additional Past Alcohol Use History / Comment(s): quit smoking 1988


Past Drug Use History: None Reported





- Past Family History


  ** Mother


Additional Family Medical History / Comment(s): SEVERE BLE LYMPH EDEMA





Medications and Allergies


 Home Medications











 Medication  Instructions  Recorded  Confirmed  Type


 


Allopurinol [Zyloprim] 300 mg PO DAILY 04/09/18 11/15/18 History


 


Atorvastatin [Lipitor] 20 mg PO DAILY 04/09/18 11/15/18 History


 


Benazepril HCl 20 mg PO BID 04/09/18 11/15/18 History


 


Calcitriol 0.25 mcg PO Q7D 04/09/18 11/15/18 History


 


Famotidine [Pepcid] 20 mg PO DAILY 04/09/18 11/15/18 History


 


Finasteride [Proscar] 5 mg PO DAILY 04/09/18 11/15/18 History


 


Furosemide [Lasix] 40 mg PO DAILY 04/09/18 11/15/18 History


 


Hydrochlorothiazide 25 mg PO DAILY 04/09/18 11/15/18 History


 


Levothyroxine Sodium [Synthroid] 25 mcg PO DAILY 04/09/18 11/15/18 History


 


Metoprolol Tartrate [Lopressor] 150 mg PO BID 04/09/18 11/15/18 History


 


Multivitamin [Men's Multi-Vitamin] 1 tab PO DAILY 04/09/18 11/15/18 History


 


Nitroglycerin 9 mg PO BID 04/09/18 11/15/18 History


 


Potassium Chloride [Klor-Con 20] 20 meq PO DAILY 04/09/18 11/15/18 History


 


Warfarin Sodium 5 mg PO SUWE 04/09/18 11/15/18 History


 


glipiZIDE [Glucotrol] 2.5 mg PO BID 04/09/18 11/15/18 History


 


hydrALAZINE HCL 50 mg PO DAILY 04/09/18 11/15/18 History


 


Warfarin [Coumadin] 2.5 mg PO MOTUTHFRSA 11/15/18 11/15/18 History











 Allergies











Allergy/AdvReac Type Severity Reaction Status Date / Time


 


No Known Allergies Allergy   Verified 11/15/18 00:27














Physical Exam


Vitals: 


 Vital Signs











  Temp Pulse Pulse Resp BP BP Pulse Ox


 


 11/15/18 07:14   84     


 


 11/15/18 07:03   84     


 


 11/15/18 05:43     18   


 


 11/15/18 05:39  98.3 F   85  18   135/82  96


 


 11/15/18 04:23  98.5 F  81   16  128/83  


 


 11/15/18 03:30   89   26 H  143/85   94 L


 


 11/15/18 01:20   81   25 H  132/97   97


 


 11/15/18 00:17  98.1 F  86   16  150/88   100


 


 11/15/18 00:14        86 L








 Intake and Output











 11/14/18 11/15/18 11/15/18





 22:59 06:59 14:59


 


Intake Total  300 


 


Balance  300 


 


Intake:   


 


  Intake, IV Titration  300 





  Amount   


 


    Azithromycin 500 mg In  250 





    Sodium Chloride 0.9% 250   





    ml @ 250 mls/hr IVPB ONCE   





    STA Rx#:[id number]   


 


    cefTRIAXone 1,000 mg In  50 





    Sodium Chloride 0.9% 50   





    ml @ 100 mls/hr IVPB ONCE   





    STA Rx#:[id number]   


 


Other:   


 


  Weight  110.8 kg 














- Constitutional


General appearance: obese





- EENT


Eyes: EOMI





- Neck


Neck: no lymphadenopathy





- Respiratory


Respiratory: bilateral: rhonchi





- Cardiovascular


Rhythm: regular


Heart sounds: normal: S1, S2


Abnormal Heart Sounds: no S3 Gallop





- Gastrointestinal


General gastrointestinal: soft, no tenderness





- Integumentary


Integumentary: no rash





- Neurologic


Neurologic: CNII-XII intact, focal deficits





- Psychiatric


Psychiatric: A&O x's 3





Results


CBC & Chem 7: 


 11/15/18 00:33





 11/15/18 02:57


Labs: 


 Abnormal Lab Results - Last 24 Hours (Table)











  11/15/18 11/15/18 11/15/18 Range/Units





  00:33 01:30 02:57 


 


WBC  12.1 H    (3.8-10.6)  k/uL


 


RBC  4.24 L    (4.30-5.90)  m/uL


 


MCV  104.2 H    (80.0-100.0)  fL


 


Monocytes # (Manual)  1.82 H    (0-1.0)  k/uL


 


PT   19.1 H   (9.0-12.0)  sec


 


INR   2.1 H   (<1.2)  


 


BUN    49 H  (9-20)  mg/dL


 


Creatinine    1.69 H  (0.66-1.25)  mg/dL


 


Glucose    121 H  (74-99)  mg/dL


 


POC Glucose (mg/dL)     (75-99)  mg/dL


 


Total Creatine Kinase     ()  U/L














  11/15/18 11/15/18 Range/Units





  02:57 07:08 


 


WBC    (3.8-10.6)  k/uL


 


RBC    (4.30-5.90)  m/uL


 


MCV    (80.0-100.0)  fL


 


Monocytes # (Manual)    (0-1.0)  k/uL


 


PT    (9.0-12.0)  sec


 


INR    (<1.2)  


 


BUN    (9-20)  mg/dL


 


Creatinine    (0.66-1.25)  mg/dL


 


Glucose    (74-99)  mg/dL


 


POC Glucose (mg/dL)   154 H  (75-99)  mg/dL


 


Total Creatine Kinase  38 L   ()  U/L














Thrombosis Risk Factor Assmnt





- Choose All That Apply


Any of the Below Risk Factors Present?: Yes


Each Factor Represents 1 point: Abnormal pulmonary function (COPD), Acute MI, 

Heart failure (<1month), Obesity (BMI >25), Serious lung disease incl. 

pneumonia (< 1month), Swollen legs (current)


Other Risk Factors: Yes


Each Risk Factor Represents 3 Points: Age 75 years or older


Thrombosis Risk Factor Assessment Total Risk Factor Score: 9


Thrombosis Risk Factor Assessment Level: High Risk





Assessment and Plan


(1) Heart failure


Current Visit: Yes   Status: Acute   Code(s): I50.9 - HEART FAILURE, 

UNSPECIFIED   SNOMED Code(s): 31792458


   





(2) Acute bronchitis


Current Visit: Yes   Status: Acute   Code(s): J20.9 - ACUTE BRONCHITIS, 

UNSPECIFIED   SNOMED Code(s): 10329315


   





(3) Community acquired pneumonia


Current Visit: Yes   Status: Acute   Code(s): J18.9 - PNEUMONIA, UNSPECIFIED 

ORGANISM   SNOMED Code(s): 225210743


   





(4) Hypoxia


Current Visit: Yes   Status: Acute   Code(s): R09.02 - HYPOXEMIA   SNOMED Code(s

): 340320624


   


Plan: 








Started on appropriate antibiotic treatment and continue Coumadin required 

pneumonia protocol.





The patient medication will be reconciled.





IV Lasix for today.





Check CBC and CMP with PT/INR in a.m. per





If no better, consider Solu-Medrol with possible pulmonology consultation.





At this time given his x-ray not showing overt failure, I will hold off on 

echocardiogram and see if he does not improve also, consider cardiology input.





Prognosis is guarded at this time.


Time with Patient: Greater than 30

## 2018-11-16 LAB
ALBUMIN SERPL-MCNC: 4 G/DL (ref 3.5–5)
ALP SERPL-CCNC: 63 U/L (ref 38–126)
ALT SERPL-CCNC: 27 U/L (ref 21–72)
ANION GAP SERPL CALC-SCNC: 13 MMOL/L
AST SERPL-CCNC: 20 U/L (ref 17–59)
BUN SERPL-SCNC: 71 MG/DL (ref 9–20)
CALCIUM SPEC-MCNC: 8.5 MG/DL (ref 8.4–10.2)
CHLORIDE SERPL-SCNC: 101 MMOL/L (ref 98–107)
CO2 SERPL-SCNC: 27 MMOL/L (ref 22–30)
ERYTHROCYTE [DISTWIDTH] IN BLOOD BY AUTOMATED COUNT: 4.15 M/UL (ref 4.3–5.9)
ERYTHROCYTE [DISTWIDTH] IN BLOOD: 15.1 % (ref 11.5–15.5)
GLUCOSE BLD-MCNC: 103 MG/DL (ref 75–99)
GLUCOSE BLD-MCNC: 125 MG/DL (ref 75–99)
GLUCOSE BLD-MCNC: 81 MG/DL (ref 75–99)
GLUCOSE BLD-MCNC: 94 MG/DL (ref 75–99)
GLUCOSE SERPL-MCNC: 92 MG/DL (ref 74–99)
HCT VFR BLD AUTO: 43.1 % (ref 39–53)
HGB BLD-MCNC: 14.1 GM/DL (ref 13–17.5)
INR PPP: 3 (ref ?–1.2)
MCH RBC QN AUTO: 33.9 PG (ref 25–35)
MCHC RBC AUTO-ENTMCNC: 32.6 G/DL (ref 31–37)
MCV RBC AUTO: 104 FL (ref 80–100)
PLATELET # BLD AUTO: 281 K/UL (ref 150–450)
POTASSIUM SERPL-SCNC: 4 MMOL/L (ref 3.5–5.1)
PROT SERPL-MCNC: 7.2 G/DL (ref 6.3–8.2)
PT BLD: 27 SEC (ref 9–12)
SODIUM SERPL-SCNC: 141 MMOL/L (ref 137–145)
WBC # BLD AUTO: 18.4 K/UL (ref 3.8–10.6)

## 2018-11-16 RX ADMIN — POTASSIUM CHLORIDE SCH MEQ: 20 TABLET, EXTENDED RELEASE ORAL at 07:49

## 2018-11-16 RX ADMIN — IPRATROPIUM BROMIDE AND ALBUTEROL SULFATE SCH ML: .5; 3 SOLUTION RESPIRATORY (INHALATION) at 07:06

## 2018-11-16 RX ADMIN — IPRATROPIUM BROMIDE AND ALBUTEROL SULFATE SCH ML: .5; 3 SOLUTION RESPIRATORY (INHALATION) at 11:04

## 2018-11-16 RX ADMIN — FAMOTIDINE SCH MG: 20 TABLET, FILM COATED ORAL at 07:49

## 2018-11-16 RX ADMIN — INSULIN ASPART SCH: 100 INJECTION, SOLUTION INTRAVENOUS; SUBCUTANEOUS at 07:45

## 2018-11-16 RX ADMIN — IPRATROPIUM BROMIDE AND ALBUTEROL SULFATE SCH ML: .5; 3 SOLUTION RESPIRATORY (INHALATION) at 19:24

## 2018-11-16 RX ADMIN — NITROGLYCERIN SCH MG: 2.5 CAPSULE ORAL at 07:49

## 2018-11-16 RX ADMIN — IPRATROPIUM BROMIDE AND ALBUTEROL SULFATE SCH ML: .5; 3 SOLUTION RESPIRATORY (INHALATION) at 15:00

## 2018-11-16 RX ADMIN — AZITHROMYCIN SCH MG: 500 TABLET, FILM COATED ORAL at 06:04

## 2018-11-16 RX ADMIN — HYDROCHLOROTHIAZIDE SCH MG: 25 TABLET ORAL at 07:50

## 2018-11-16 RX ADMIN — LISINOPRIL SCH MG: 20 TABLET ORAL at 07:49

## 2018-11-16 RX ADMIN — ALLOPURINOL SCH MG: 300 TABLET ORAL at 07:49

## 2018-11-16 RX ADMIN — NITROGLYCERIN SCH MG: 6.5 CAPSULE ORAL at 07:49

## 2018-11-16 RX ADMIN — METOPROLOL TARTRATE SCH MG: 50 TABLET, FILM COATED ORAL at 07:49

## 2018-11-16 RX ADMIN — ATORVASTATIN CALCIUM SCH MG: 20 TABLET, FILM COATED ORAL at 07:49

## 2018-11-16 RX ADMIN — THERA TABS SCH EACH: TAB at 12:39

## 2018-11-16 RX ADMIN — LEVOTHYROXINE SODIUM SCH MCG: 25 TABLET ORAL at 06:04

## 2018-11-16 RX ADMIN — INSULIN ASPART SCH: 100 INJECTION, SOLUTION INTRAVENOUS; SUBCUTANEOUS at 18:16

## 2018-11-16 RX ADMIN — FUROSEMIDE SCH MG: 10 INJECTION, SOLUTION INTRAMUSCULAR; INTRAVENOUS at 07:48

## 2018-11-16 RX ADMIN — NITROGLYCERIN SCH MG: 2.5 CAPSULE ORAL at 21:17

## 2018-11-16 RX ADMIN — INSULIN ASPART SCH: 100 INJECTION, SOLUTION INTRAVENOUS; SUBCUTANEOUS at 12:29

## 2018-11-16 RX ADMIN — NITROGLYCERIN SCH MG: 6.5 CAPSULE ORAL at 21:17

## 2018-11-16 RX ADMIN — INSULIN ASPART SCH: 100 INJECTION, SOLUTION INTRAVENOUS; SUBCUTANEOUS at 21:17

## 2018-11-16 RX ADMIN — FINASTERIDE SCH MG: 5 TABLET, FILM COATED ORAL at 07:49

## 2018-11-16 RX ADMIN — WARFARIN SODIUM SCH MG: 2.5 TABLET ORAL at 18:12

## 2018-11-16 RX ADMIN — METOPROLOL TARTRATE SCH MG: 50 TABLET, FILM COATED ORAL at 21:18

## 2018-11-16 NOTE — XR
EXAMINATION TYPE: XR chest 2V

 

DATE OF EXAM: 11/16/2018

 

COMPARISON: Yesterday

 

HISTORY: Pneumonia

 

TECHNIQUE:  Frontal and lateral views of the chest are obtained.

 

FINDINGS:  

Heart is enlarged. There is no heart failure. Costophrenic angles are clear. There is left axillary p
acemaker with the lead tips in the right ventricle. I see no pleural effusion.

 

IMPRESSION:

Cardiomegaly. No acute lung disease. No change compared to yesterday.

## 2018-11-16 NOTE — CDI
Last Revision, December 2017



                                                   Documentation Clarification 
Form

Date: 11/16/2018 11:23:55 AM

From: Roro Servin RN, CCDS

Phone: [phone]

MRN: [id number]

Admit Date: 11/15/2018 2:37:00 AM

Patient Name: Hola Motley

Visit Number: [id number]

Discharge Date:



ATTENTION: The Clinical Documentation Specialists (CDI) and [facility] Coding Staff 
appreciate your assistance in clarifying documentation. Please respond to the 
clarification below the line at the bottom and electronically sign. The CDI & 
[facility] Coding staff will review the response and follow-up if needed. Please note: 
Queries are made part of the Legal Health Record. If you have any questions, 
please contact the author of this message via ITS.



Dany Mcnamara MD



History/Risk Factors:. COPD, Congestive heart failure, Diabetes Mellitus, 
Hypertension, Former smoker 



Clinical Indicators:  Present with shortness of breath, cough congestion 
hypoxia.

VS/Pulse OX: 150/88 86 16 98.1 100

BNP: 3710

Echocardiogram Results: EF 20 % ( on echo done in May of 2018 (per Nephrology 
progress note 11/16/18)

11/16/18 Dr. Corbin progress note:  Systolic CHF with EF of 20 %

Chest X Ray: Mild cardiomegaly, no ac cardiopulmonary disease. No heart failure 
No significant change compared to old exam. 



Treatment:  

Zithromax PO

Duoneb's  

Rocephin IV

Lasix IV  Daily

Monitor O2 Sat's (titrate)



In your professional opinion, can you please clarify the acuity of CHF if known?



Systolic Heart Failure:

Acute 

Chronic

Acute on Chronic  

Unable to Determine

Other, please specify

   

lease continue to document in your progress notes and discharge summary in 
order to capture severity of illness and risk of mortality. Include clinical 
findings that support your diagnosis.

___________________________________________________________________
MTDD

## 2018-11-16 NOTE — P.PN
Subjective


Progress Note Date: 11/16/18


Principal diagnosis: 





Community acquired pneumonia/pedal edema.





This is a continue process on a 73-year-old white male essentially admitted for 

shortness of breath with community acquired pneumonia clinically.  The patient 

was placed on Lasix 40 mg daily IV and had significant diuresis.  Edema of the 

extremities is improving.  No voiding difficulties.  No significant nausea, 

vomiting or diarrhea is noted.





Objective





- Vital Signs


Vital signs: 


 Vital Signs











Temp  98.2 F   11/16/18 07:00


 


Pulse  88   11/16/18 07:17


 


Resp  16   11/16/18 07:00


 


BP  131/72   11/16/18 07:00


 


Pulse Ox  100   11/16/18 07:00








 Intake & Output











 11/15/18 11/16/18 11/16/18





 18:59 06:59 18:59


 


Intake Total 440  


 


Balance 440  


 


Intake:   


 


  Oral 440  


 


Other:   


 


  Voiding Method Toilet  





 Urinal  


 


  # Voids 1 2 


 


  # Bowel Movements 0 0 














- Constitutional


General appearance: Present: no acute distress





- EENT


Eyes: Absent: abnormal pupil





- Neck


Neck: Absent: lymphadenopathy





- Respiratory


Respiratory: bilateral: wheezing





- Cardiovascular


Heart sounds: normal: S1, S2


Abnormal Heart Sounds: Absent: S3 Gallop





- Gastrointestinal


General gastrointestinal: Present: soft.  Absent: tenderness





- Integumentary


Integumentary: Absent: rash





- Neurologic


Neurologic: Present: CNII-XII intact.  Absent: focal deficits





- Labs


CBC & Chem 7: 


 11/15/18 00:33





 11/15/18 02:57


Labs: 


 Abnormal Lab Results - Last 24 Hours (Table)











  11/15/18 11/15/18 11/15/18 Range/Units





  00:33 10:05 11:56 


 


PT   22.1 H   (9.0-12.0)  sec


 


INR   2.5 H   (<1.2)  


 


POC Glucose (mg/dL)    286 H  (75-99)  mg/dL


 


Hemoglobin A1c  6.1 H    (4.0-6.0)  %














  11/15/18 11/15/18 11/16/18 Range/Units





  17:05 20:30 07:01 


 


PT     (9.0-12.0)  sec


 


INR     (<1.2)  


 


POC Glucose (mg/dL)  104 H  129 H  103 H  (75-99)  mg/dL


 


Hemoglobin A1c     (4.0-6.0)  %








 Microbiology - Last 24 Hours (Table)











 11/15/18 01:05 Blood Culture - Preliminary





 Blood    No Growth after 24 hours














Assessment and Plan


(1) Heart failure


Current Visit: Yes   Status: Acute   Code(s): I50.9 - HEART FAILURE, 

UNSPECIFIED   SNOMED Code(s): 33773249


   





(2) Acute bronchitis


Current Visit: Yes   Status: Acute   Code(s): J20.9 - ACUTE BRONCHITIS, 

UNSPECIFIED   SNOMED Code(s): 10481354


   





(3) Community acquired pneumonia


Current Visit: Yes   Status: Acute   Code(s): J18.9 - PNEUMONIA, UNSPECIFIED 

ORGANISM   SNOMED Code(s): 531741889


   





(4) Hypoxia


Current Visit: Yes   Status: Acute   Code(s): R09.02 - HYPOXEMIA   SNOMED Code(s

): 970987052


   


Plan: 





Continue current regimen or treatment.





Probably change back to Lasix by mouth in the a.m.





Check CMP in a.m.





Dr. Chopra's group will be covering for the weekend.





Anticipate discharge in next 24-48 hours.





If the patient decompensates, consider pulmonary/cardiology referral.  However, 

given his improvement today, I suspect with continued treatment he should be 

discharged soon.


Time with Patient: Less than 30

## 2018-11-16 NOTE — CONS
CONSULTATION



REASON FOR CONSULT:

Renal failure.



HISTORY OF PRESENT ILLNESS:

Patient is a 73-year-old male with history of CHF, hypertension, and coronary artery

disease and type 2 diabetes.  The patient presented to the hospital with complaints of

increased shortness of breath.  He also had increased lower extremity edema.  His serum

creatinine was at 1.69 mg/dL.  A review of previous labs shows a creatinine of 1.7 and

1.5 mg/dL in 2016 and July of 2018.  The patient denies use of any nonsteroidal anti-

inflammatory agents.



Patient does have chronic kidney disease NKF stage III, with baseline creatinine about

1.7 mg/dL.  He was last seen in the office in July of 2018.  The etiology of his CKD is

nephrosclerosis.  Currently the patient's blood pressure is on the lower side.  He is

maintained on ACE inhibitors.  He has been voiding and he states he is feeling better.



PAST MEDICAL HISTORY:

CKD stage 3 secondary to nephrosclerosis, type 2 diabetes, gastroesophageal reflux

disease, hyperlipidemia, hypertension, history of Guillain [place] syndrome and

cataracts. BPH.



PAST SURGICAL HISTORY:

Cardiac catheterization, coronary stent placement, pacemaker placement, cataract

surgery.



SOCIAL HISTORY:

Patient is a former smoker.  No history of drug abuse or alcohol abuse.



MEDICATIONS:

Medications at home prior to admission  include:

1. Zyloprim.

2. Lipitor.

3. Benazepril.

4. Calcitriol.

5. Pepcid.

6. Proscar.

7. Lasix.

8. Hydrochlorothiazide.

9. Synthroid.

10.Lopressor.

11.Multivitamins.

12.Nitroglycerin.

13.Potassium.

14.Sodium.

15.Glucotrol.

16.Hydralazine.

17.Coumadin.



EXAMINATION:

Patient is currently comfortable, awake.  He is not in any acute distress.  Blood

pressure this morning was 135/82, heart rate 85 per minute.  Patient is afebrile.

Examination of the heart S1, S2.  Examination of the lungs bilateral breath sounds are

heard.  Abdomen is soft, nontender.  Examination of lower extremities shows edema, 1+

bilaterally.  CNS exam is grossly intact.



LABS:

Reveal sodium of 141, potassium 4.2, chloride 103, BUN 49, serum creatinine 1.69,

calcium 9.2, magnesium 1.9.  Albumin 4.3.  UA shows no evidence of blood, protein, or

cells.



ASSESSMENT:

1. Chronic kidney disease NKF stage III, with renal function at baseline.

2. Cardiomyopathy with ejection fraction less than 20% on echocardiogram done in May

    of 2018 with severely dilated left atrium.

3. CKD mineral bone disorder maintained on Rocaltrol, which we can continue.

4. Type 2 diabetes, maintained on Glucotrol.



PLAN:

May continue with current dose of Lasix.  Continue with ACE inhibitors.  However, if

the blood pressure remains low.  I will decrease the dose of lisinopril.  Repeat labs

in a.m.  We may need to repeat another chest x-ray.



Thank you for this consultation.  We will continue to follow the patient with you

during his hospitalization.





MMODL / IJN: [id number] / Job#: [id number]

## 2018-11-16 NOTE — P.PN
Subjective





Patient is seen in follow-up for acute kidney injury on chronic kidney disease.

  Patient has chronic kidney disease stage III with baseline creatinine near 1.5

-1.7 secondary to nephrosclerosis.  Patient presented with dyspnea and lower 

extremity edema.  Patient states edema has improved.  He admits to good urine 

output.  Creatinine today is up to 2.08.  He is maintained on Lasix 40 mg IV 

daily along with hydrochlorothiazide.  No vomiting or diarrhea.





Vital signs are stable.


General: The patient appeared well nourished and normally developed. 


HEENT: Head exam is unremarkable. Neck is without jugular venous distension.


LUNGS: Lungs are clear to auscultation and percussion. Breath sounds decreased.


HEART: Rate and Rhythm are regular. First and second heart sounds normal. No 

murmurs, rubs or gallops. 


ABDOMEN: Abdominal exam reveals normal bowel sounds. Non-tender and non-

distended. No evidence of peritonitis.


EXTREMITITES: 1+ edema.





Objective





- Vital Signs


Vital signs: 


 Vital Signs











Temp  98.2 F   11/16/18 07:00


 


Pulse  88   11/16/18 07:17


 


Resp  16   11/16/18 07:00


 


BP  131/72   11/16/18 07:00


 


Pulse Ox  100   11/16/18 07:00








 Intake & Output











 11/15/18 11/16/18 11/16/18





 18:59 06:59 18:59


 


Intake Total 440  


 


Balance 440  


 


Intake:   


 


  Oral 440  


 


Other:   


 


  Voiding Method Toilet  





 Urinal  


 


  # Voids 1 2 


 


  # Bowel Movements 0 0 














- Labs


CBC & Chem 7: 


 11/16/18 08:01





 11/16/18 08:01


Labs: 


 Abnormal Lab Results - Last 24 Hours (Table)











  11/15/18 11/15/18 11/15/18 Range/Units





  00:33 10:05 11:56 


 


WBC     (3.8-10.6)  k/uL


 


RBC     (4.30-5.90)  m/uL


 


MCV     (80.0-100.0)  fL


 


PT   22.1 H   (9.0-12.0)  sec


 


INR   2.5 H   (<1.2)  


 


BUN     (9-20)  mg/dL


 


Creatinine     (0.66-1.25)  mg/dL


 


POC Glucose (mg/dL)    286 H  (75-99)  mg/dL


 


Hemoglobin A1c  6.1 H    (4.0-6.0)  %














  11/15/18 11/15/18 11/16/18 Range/Units





  17:05 20:30 07:01 


 


WBC     (3.8-10.6)  k/uL


 


RBC     (4.30-5.90)  m/uL


 


MCV     (80.0-100.0)  fL


 


PT     (9.0-12.0)  sec


 


INR     (<1.2)  


 


BUN     (9-20)  mg/dL


 


Creatinine     (0.66-1.25)  mg/dL


 


POC Glucose (mg/dL)  104 H  129 H  103 H  (75-99)  mg/dL


 


Hemoglobin A1c     (4.0-6.0)  %














  11/16/18 11/16/18 Range/Units





  08:01 08:01 


 


WBC  18.4 H   (3.8-10.6)  k/uL


 


RBC  4.15 L   (4.30-5.90)  m/uL


 


MCV  104.0 H   (80.0-100.0)  fL


 


PT    (9.0-12.0)  sec


 


INR    (<1.2)  


 


BUN   71 H  (9-20)  mg/dL


 


Creatinine   2.08 H  (0.66-1.25)  mg/dL


 


POC Glucose (mg/dL)    (75-99)  mg/dL


 


Hemoglobin A1c    (4.0-6.0)  %








 Microbiology - Last 24 Hours (Table)











 11/15/18 01:05 Blood Culture - Preliminary





 Blood    No Growth after 24 hours














Assessment and Plan


Plan: 





Assessment:


1.  Nonoliguric acute kidney injury mostly prerenal secondary to diuresis.  

Creatinine 2.0 today.  Urinalysis is benign.


2.  Chronic kidney disease stage III secondary to nephrosclerosis with baseline 

creatinine in the range of 1.5-1.7.


3.  Systolic CHF with ejection fraction of 20%.


4.  Volume overload.  Improving.  


5.  Hypertension with chronic kidney disease.  Controlled.


6.  Chronic kidney disease mineral bone disease maintained on calcitriol.





Plan:


Continue Lasix 40 mg IV daily.


Maintain hydrochlorothiazide.


Decrease lisinopril to 20 mg once daily.


To hold antihypertensives for systolic blood pressure less than 120.


Avoid nephrotoxins.


Repeat electrolytes in the morning.

## 2018-11-16 NOTE — CDI
Last Revision, December 2017





Documentation Clarification Form



Date: 10/16/18

From: Roro Servin RN, CCDS

Phone: [phone]

MRN: [id number]

Admit Date: 11/15/2018 2:37:00 AM

Patient Name: Hola Motley

Visit Number: [id number]

Discharge Date:



ATTENTION: The Clinical Documentation Specialists (CDI) and [facility] Coding Staff 
appreciate your assistance in clarifying documentation. Please respond to the 
clarification below the line at the bottom and electronically sign. The CDI & 
[facility] Coding staff will review the response and follow-up if needed. Please note: 
Queries are made part of the Legal Health Record. If you have any questions, 
please contact the author of this message via ITS.



Simone Amaral MD



On admission EKG is showing atrial fibrillation with premature ventricular 
aberrantly conducted complexes.



Patient history/risk factors Diabetes mellitus, hypertension MI,AICD, Heart 
catheterization with stent, Several Cardioversions. 

Clinical Indicators: present with shortness of breath, dyspnea on exertion. ED 
with EKG interpreted by Dr. Robertson states "EKG shows A.fib rate of 85".

Vital Signs:150/88 86 16 98.1



Treatment:

Coumadin PO Per orders

Monitor Labs 



In your professional opinion, can you please further clarify EKG findings of 
Atrial Fibrillation?



Atrial Fibrillation (specify type

Atrial Fibrillation ruled out

Other, please specify 

Unable to determine



Please continue to document in your progress notes and discharge summary in 
order to capture severity of illness and risk of mortality. Include clinical 
findings that support your diagnosis.

___________________________________________________________________
MTDD

## 2018-11-17 LAB
ANION GAP SERPL CALC-SCNC: 10 MMOL/L
BUN SERPL-SCNC: 87 MG/DL (ref 9–20)
CALCIUM SPEC-MCNC: 8.3 MG/DL (ref 8.4–10.2)
CHLORIDE SERPL-SCNC: 102 MMOL/L (ref 98–107)
CO2 SERPL-SCNC: 30 MMOL/L (ref 22–30)
GLUCOSE BLD-MCNC: 102 MG/DL (ref 75–99)
GLUCOSE BLD-MCNC: 118 MG/DL (ref 75–99)
GLUCOSE BLD-MCNC: 136 MG/DL (ref 75–99)
GLUCOSE BLD-MCNC: 81 MG/DL (ref 75–99)
GLUCOSE SERPL-MCNC: 136 MG/DL (ref 74–99)
INR PPP: 3.7 (ref ?–1.2)
MAGNESIUM SPEC-SCNC: 2.2 MG/DL (ref 1.6–2.3)
POTASSIUM SERPL-SCNC: 4.5 MMOL/L (ref 3.5–5.1)
PT BLD: 32.8 SEC (ref 9–12)
SODIUM SERPL-SCNC: 142 MMOL/L (ref 137–145)

## 2018-11-17 RX ADMIN — IPRATROPIUM BROMIDE AND ALBUTEROL SULFATE SCH ML: .5; 3 SOLUTION RESPIRATORY (INHALATION) at 15:09

## 2018-11-17 RX ADMIN — IPRATROPIUM BROMIDE AND ALBUTEROL SULFATE SCH ML: .5; 3 SOLUTION RESPIRATORY (INHALATION) at 11:04

## 2018-11-17 RX ADMIN — INSULIN ASPART SCH: 100 INJECTION, SOLUTION INTRAVENOUS; SUBCUTANEOUS at 07:39

## 2018-11-17 RX ADMIN — FAMOTIDINE SCH MG: 20 TABLET, FILM COATED ORAL at 08:47

## 2018-11-17 RX ADMIN — AZITHROMYCIN SCH MG: 500 TABLET, FILM COATED ORAL at 06:10

## 2018-11-17 RX ADMIN — NITROGLYCERIN SCH MG: 6.5 CAPSULE ORAL at 21:42

## 2018-11-17 RX ADMIN — ATORVASTATIN CALCIUM SCH MG: 20 TABLET, FILM COATED ORAL at 08:47

## 2018-11-17 RX ADMIN — THERA TABS SCH EACH: TAB at 08:47

## 2018-11-17 RX ADMIN — HYDROCHLOROTHIAZIDE SCH MG: 25 TABLET ORAL at 08:47

## 2018-11-17 RX ADMIN — FINASTERIDE SCH MG: 5 TABLET, FILM COATED ORAL at 08:47

## 2018-11-17 RX ADMIN — ACETAMINOPHEN PRN MG: 500 TABLET ORAL at 18:10

## 2018-11-17 RX ADMIN — IPRATROPIUM BROMIDE AND ALBUTEROL SULFATE SCH ML: .5; 3 SOLUTION RESPIRATORY (INHALATION) at 07:21

## 2018-11-17 RX ADMIN — FUROSEMIDE SCH MG: 10 INJECTION, SOLUTION INTRAMUSCULAR; INTRAVENOUS at 08:47

## 2018-11-17 RX ADMIN — NITROGLYCERIN SCH MG: 6.5 CAPSULE ORAL at 08:47

## 2018-11-17 RX ADMIN — LEVOTHYROXINE SODIUM SCH MCG: 25 TABLET ORAL at 06:10

## 2018-11-17 RX ADMIN — IPRATROPIUM BROMIDE AND ALBUTEROL SULFATE SCH ML: .5; 3 SOLUTION RESPIRATORY (INHALATION) at 18:52

## 2018-11-17 RX ADMIN — ALLOPURINOL SCH MG: 300 TABLET ORAL at 08:47

## 2018-11-17 RX ADMIN — POTASSIUM CHLORIDE SCH MEQ: 20 TABLET, EXTENDED RELEASE ORAL at 08:47

## 2018-11-17 RX ADMIN — INSULIN ASPART SCH UNIT: 100 INJECTION, SOLUTION INTRAVENOUS; SUBCUTANEOUS at 17:39

## 2018-11-17 RX ADMIN — INSULIN ASPART SCH: 100 INJECTION, SOLUTION INTRAVENOUS; SUBCUTANEOUS at 21:29

## 2018-11-17 RX ADMIN — METOPROLOL TARTRATE SCH MG: 50 TABLET, FILM COATED ORAL at 21:43

## 2018-11-17 RX ADMIN — NITROGLYCERIN SCH MG: 2.5 CAPSULE ORAL at 08:47

## 2018-11-17 RX ADMIN — INSULIN ASPART SCH: 100 INJECTION, SOLUTION INTRAVENOUS; SUBCUTANEOUS at 12:06

## 2018-11-17 RX ADMIN — METOPROLOL TARTRATE SCH MG: 50 TABLET, FILM COATED ORAL at 08:47

## 2018-11-17 RX ADMIN — NITROGLYCERIN SCH MG: 2.5 CAPSULE ORAL at 21:42

## 2018-11-17 NOTE — P.PN
Subjective





Patient is seen in follow-up for acute kidney injury on chronic kidney disease.

  Patient has chronic kidney disease stage III with baseline creatinine near 1.5

-1.7 secondary to nephrosclerosis.  Patient presented with dyspnea and lower 

extremity edema.  Patient states edema has improved.  He admits to good urine 

output.  Creatinine up to 2.08 as of yesterday.  He is maintained on Lasix 40 

mg IV daily along with hydrochlorothiazide.  No vomiting or diarrhea.





Vital signs are stable.


General: The patient appeared well nourished and normally developed. 


HEENT: Head exam is unremarkable. Neck is without jugular venous distension.


LUNGS: Lungs are clear to auscultation and percussion. Breath sounds decreased.


HEART: Rate and Rhythm are regular. First and second heart sounds normal. No 

murmurs, rubs or gallops. 


ABDOMEN: Abdominal exam reveals normal bowel sounds. Non-tender and non-

distended. No evidence of peritonitis.


EXTREMITITES: 1+ edema.





Objective





- Vital Signs


Vital signs: 


 Vital Signs











Temp  98.2 F   11/17/18 06:29


 


Pulse  80   11/17/18 07:33


 


Resp  18   11/17/18 06:29


 


BP  106/64   11/17/18 06:29


 


Pulse Ox  95   11/17/18 07:24








 Intake & Output











 11/16/18 11/17/18 11/17/18





 18:59 06:59 18:59


 


Intake Total  750 


 


Balance  750 


 


Intake:   


 


  Oral  750 


 


Other:   


 


  Voiding Method Toilet  


 


  # Voids 2 1 














- Labs


CBC & Chem 7: 


 11/16/18 08:01





 11/16/18 08:01


Labs: 


 Abnormal Lab Results - Last 24 Hours (Table)











  11/16/18 11/16/18 11/16/18 Range/Units





  08:01 08:01 08:01 


 


WBC  18.4 H    (3.8-10.6)  k/uL


 


RBC  4.15 L    (4.30-5.90)  m/uL


 


MCV  104.0 H    (80.0-100.0)  fL


 


PT    27.0 H  (9.0-12.0)  sec


 


INR    3.0 H  (<1.2)  


 


BUN   71 H   (9-20)  mg/dL


 


Creatinine   2.08 H   (0.66-1.25)  mg/dL


 


POC Glucose (mg/dL)     (75-99)  mg/dL














  11/16/18 Range/Units





  20:26 


 


WBC   (3.8-10.6)  k/uL


 


RBC   (4.30-5.90)  m/uL


 


MCV   (80.0-100.0)  fL


 


PT   (9.0-12.0)  sec


 


INR   (<1.2)  


 


BUN   (9-20)  mg/dL


 


Creatinine   (0.66-1.25)  mg/dL


 


POC Glucose (mg/dL)  125 H  (75-99)  mg/dL








 Microbiology - Last 24 Hours (Table)











 11/15/18 01:05 Blood Culture - Preliminary





 Blood    No Growth after 48 hours














Assessment and Plan


Plan: 





Assessment:


1.  Nonoliguric acute kidney injury mostly prerenal secondary to diuresis.  

Creatinine 2.08 as of yesterday.  Urinalysis is benign.


2.  Chronic kidney disease stage III secondary to nephrosclerosis with baseline 

creatinine in the range of 1.5-1.7.


3.  Systolic CHF with ejection fraction of 20%.


4.  Volume overload.  Improving.  


5.  Hypertension with chronic kidney disease.  Controlled.


6.  Chronic kidney disease mineral bone disease maintained on calcitriol.





Plan:


Continue Lasix 40 mg IV daily - transition to oral upon discharge.


Maintain hydrochlorothiazide.


To hold antihypertensives for systolic blood pressure less than 120.


Discontinue hydralazine as its only ordered once a day.


Avoid nephrotoxins.


Repeat electrolytes in the morning.

## 2018-11-18 VITALS — RESPIRATION RATE: 16 BRPM

## 2018-11-18 LAB
ANION GAP SERPL CALC-SCNC: 11 MMOL/L
BUN SERPL-SCNC: 95 MG/DL (ref 9–20)
CALCIUM SPEC-MCNC: 8.3 MG/DL (ref 8.4–10.2)
CHLORIDE SERPL-SCNC: 103 MMOL/L (ref 98–107)
CO2 SERPL-SCNC: 29 MMOL/L (ref 22–30)
GLUCOSE BLD-MCNC: 102 MG/DL (ref 75–99)
GLUCOSE BLD-MCNC: 104 MG/DL (ref 75–99)
GLUCOSE BLD-MCNC: 106 MG/DL (ref 75–99)
GLUCOSE BLD-MCNC: 76 MG/DL (ref 75–99)
GLUCOSE SERPL-MCNC: 112 MG/DL (ref 74–99)
INR PPP: 3.1 (ref ?–1.2)
MAGNESIUM SPEC-SCNC: 2.4 MG/DL (ref 1.6–2.3)
POTASSIUM SERPL-SCNC: 4.7 MMOL/L (ref 3.5–5.1)
PT BLD: 27.9 SEC (ref 9–12)
SODIUM SERPL-SCNC: 143 MMOL/L (ref 137–145)

## 2018-11-18 RX ADMIN — IPRATROPIUM BROMIDE AND ALBUTEROL SULFATE SCH ML: .5; 3 SOLUTION RESPIRATORY (INHALATION) at 19:01

## 2018-11-18 RX ADMIN — NITROGLYCERIN SCH MG: 6.5 CAPSULE ORAL at 09:24

## 2018-11-18 RX ADMIN — INSULIN ASPART SCH: 100 INJECTION, SOLUTION INTRAVENOUS; SUBCUTANEOUS at 07:36

## 2018-11-18 RX ADMIN — POTASSIUM CHLORIDE SCH MEQ: 20 TABLET, EXTENDED RELEASE ORAL at 09:23

## 2018-11-18 RX ADMIN — HYDROCHLOROTHIAZIDE SCH MG: 25 TABLET ORAL at 09:24

## 2018-11-18 RX ADMIN — INSULIN ASPART SCH: 100 INJECTION, SOLUTION INTRAVENOUS; SUBCUTANEOUS at 20:41

## 2018-11-18 RX ADMIN — THERA TABS SCH EACH: TAB at 09:23

## 2018-11-18 RX ADMIN — FINASTERIDE SCH MG: 5 TABLET, FILM COATED ORAL at 09:23

## 2018-11-18 RX ADMIN — NITROGLYCERIN SCH MG: 2.5 CAPSULE ORAL at 20:40

## 2018-11-18 RX ADMIN — FAMOTIDINE SCH MG: 20 TABLET, FILM COATED ORAL at 09:23

## 2018-11-18 RX ADMIN — LEVOTHYROXINE SODIUM SCH MCG: 25 TABLET ORAL at 06:21

## 2018-11-18 RX ADMIN — IPRATROPIUM BROMIDE AND ALBUTEROL SULFATE SCH ML: .5; 3 SOLUTION RESPIRATORY (INHALATION) at 10:43

## 2018-11-18 RX ADMIN — IPRATROPIUM BROMIDE AND ALBUTEROL SULFATE SCH ML: .5; 3 SOLUTION RESPIRATORY (INHALATION) at 06:52

## 2018-11-18 RX ADMIN — INSULIN ASPART SCH: 100 INJECTION, SOLUTION INTRAVENOUS; SUBCUTANEOUS at 11:53

## 2018-11-18 RX ADMIN — ATORVASTATIN CALCIUM SCH MG: 20 TABLET, FILM COATED ORAL at 09:24

## 2018-11-18 RX ADMIN — ACETAMINOPHEN PRN MG: 500 TABLET ORAL at 12:37

## 2018-11-18 RX ADMIN — ALLOPURINOL SCH MG: 300 TABLET ORAL at 09:24

## 2018-11-18 RX ADMIN — NITROGLYCERIN SCH MG: 6.5 CAPSULE ORAL at 20:40

## 2018-11-18 RX ADMIN — METOPROLOL TARTRATE SCH MG: 50 TABLET, FILM COATED ORAL at 09:23

## 2018-11-18 RX ADMIN — INSULIN ASPART SCH: 100 INJECTION, SOLUTION INTRAVENOUS; SUBCUTANEOUS at 17:40

## 2018-11-18 RX ADMIN — METOPROLOL TARTRATE SCH MG: 50 TABLET, FILM COATED ORAL at 20:40

## 2018-11-18 RX ADMIN — FUROSEMIDE SCH MG: 40 TABLET ORAL at 09:23

## 2018-11-18 RX ADMIN — AZITHROMYCIN SCH MG: 500 TABLET, FILM COATED ORAL at 06:21

## 2018-11-18 RX ADMIN — NITROGLYCERIN SCH MG: 2.5 CAPSULE ORAL at 09:23

## 2018-11-18 RX ADMIN — IPRATROPIUM BROMIDE AND ALBUTEROL SULFATE SCH ML: .5; 3 SOLUTION RESPIRATORY (INHALATION) at 14:43

## 2018-11-18 NOTE — P.PN
Subjective


Progress Note Date: 11/17/18


Principal diagnosis: 





Community acquired pneumonia/pedal edema.





This is a history and physical on a 73-year-old white male with history of 

heart failure who had sudden onset of shortness of breath.  No edema was 

worsening and he did double up on his furosemide.  His edema did not get any 

worse but his shortness of breath did slowly become worse.  Dyspnea on exertion 

was noted and he was appropriately taken to the hospital.  Evaluation did not 

show overt heart failure but more related acquired pneumonia symptomatology.  

The patient is now admitted for such.  No voiding difficulties.  No significant 

nausea, vomiting or diar





11/17/18


Patient is seen in follow-up for acute kidney injury on chronic kidney disease 

and PNA.  Patient has chronic kidney disease stage III with baseline creatinine 

near 1.5-1.7 secondary to nephrosclerosis.  Patient presented with dyspnea and 

lower extremity edema.  Patient states edema has improved.  He admits to good 

urine output.  Creatinine today is up to 2.08.  He is maintained on Lasix 40 mg 

IV daily along with hydrochlorothiazide. 


Patient remains on antibiotics in the form of IV ceftriaxone and oral Zithromax

; white blood count has escalated to 18.4 yesterday;patient remains afebrile; 

we will continue with current antibiotics and continue to monitor


Patient's INR up to 3.7 this morning; there are no signs of bleeding; pharmacy 

is dosing Coumadin; we'll continue to monitor PT/INR daily





Objective





- Vital Signs


Vital signs: 


 Vital Signs











Temp  98.2 F   11/17/18 06:29


 


Pulse  74   11/17/18 11:17


 


Resp  20   11/17/18 11:17


 


BP  106/64   11/17/18 06:29


 


Pulse Ox  95   11/17/18 07:24








 Intake & Output











 11/16/18 11/17/18 11/17/18





 18:59 06:59 18:59


 


Intake Total  750 


 


Balance  750 


 


Intake:   


 


  Oral  750 


 


Other:   


 


  Voiding Method Toilet  


 


  # Voids 2 1 














- Exam


General: The patient appeared well nourished and normally developed. 


HEENT: Head exam is unremarkable. Neck is without jugular venous distension.


LUNGS: Lungs are clear to auscultation and percussion. Breath sounds decreased.


HEART: Rate and Rhythm are regular. First and second heart sounds normal. No 

murmurs, rubs or gallops. 


ABDOMEN: Abdominal exam reveals normal bowel sounds. Non-tender and non-

distended. No evidence of peritonitis.


EXTREMITITES: 1+ edema.








- Labs


CBC & Chem 7: 


 11/16/18 08:01





 11/18/18 08:02


Labs: 


 Abnormal Lab Results - Last 24 Hours (Table)











  11/16/18 11/17/18 11/17/18 Range/Units





  20:26 08:39 08:39 


 


PT   32.8 H   (9.0-12.0)  sec


 


INR   3.7 H   (<1.2)  


 


BUN    87 H  (9-20)  mg/dL


 


Creatinine    2.23 H  (0.66-1.25)  mg/dL


 


Glucose    136 H  (74-99)  mg/dL


 


POC Glucose (mg/dL)  125 H    (75-99)  mg/dL


 


Calcium    8.3 L  (8.4-10.2)  mg/dL














  11/17/18 Range/Units





  12:05 


 


PT   (9.0-12.0)  sec


 


INR   (<1.2)  


 


BUN   (9-20)  mg/dL


 


Creatinine   (0.66-1.25)  mg/dL


 


Glucose   (74-99)  mg/dL


 


POC Glucose (mg/dL)  102 H  (75-99)  mg/dL


 


Calcium   (8.4-10.2)  mg/dL








 Microbiology - Last 24 Hours (Table)











 11/15/18 01:05 Blood Culture - Preliminary





 Blood    No Growth after 48 hours














Assessment and Plan


Assessment: 


1.  Nonoliguric acute kidney injury mostly prerenal secondary to diuresis.  


- Creatinine 2.0 today.  Urinalysis is benign.


- Chronic kidney disease stage III secondary to nephrosclerosis with baseline 

creatinine in the range of 1.5-1.7.


- Chronic kidney disease mineral bone disease maintained on calcitriol.





2.  Acute on Chronic Systolic CHF 


- with ejection fraction of 20%.


- Volume overload.  Improving.  





3. Ac Bronchitis; improving; continue with IV antibiotics





4. Community Acquired PNA


- We will continue with IV ceftriaxone along with Zithromax 500 mg daily


- Bronchodilator nebulizer treatments with DuoNeb 4 times a day and when 

necessary


- Symptomatic treatment of pneumonia





5.  Hypertension with chronic kidney disease.  Controlled on home dose of 

lisinopril and hydrochlorothiazide along with metoprolol.





6.  DVT Prophylaxis; systemic anticoagulation with Coumadin





CODE STATUS; full code





Plan:


Continue Lasix 40 mg IV daily.


Maintain hydrochlorothiazide.


Decrease lisinopril to 20 mg once daily.


To hold antihypertensives for systolic blood pressure less than 120.


Avoid nephrotoxins.


Repeat electrolytes in the morning.





Time with Patient: Greater than 30

## 2018-11-18 NOTE — P.PN
Subjective





Patient is seen in follow-up for acute kidney injury on chronic kidney disease.

  Patient has chronic kidney disease stage III with baseline creatinine near 1.5

-1.7 secondary to nephrosclerosis.  Patient presented with dyspnea and lower 

extremity edema.  Patient states edema has improved.  He admits to good urine 

output.  Creatinine up to 2.23 today.  He is maintained on Lasix 40 mg IV daily 

along with hydrochlorothiazide.  No vomiting or diarrhea.  Feels weak.  Patient 

sustained the almost sustained a fall while coming out of the bathroom 

yesterday.





Vital signs are stable.


General: The patient appeared well nourished and normally developed. 


HEENT: Head exam is unremarkable. Neck is without jugular venous distension.


LUNGS: Lungs are clear to auscultation and percussion. Breath sounds decreased.


HEART: Rate and Rhythm are regular. First and second heart sounds normal. No 

murmurs, rubs or gallops. 


ABDOMEN: Abdominal exam reveals normal bowel sounds. Non-tender and non-

distended. No evidence of peritonitis.


EXTREMITITES: 1+ edema.





Objective





- Vital Signs


Vital signs: 


 Vital Signs











Temp  97.6 F   11/18/18 06:35


 


Pulse  76   11/18/18 07:04


 


Resp  18   11/18/18 06:35


 


BP  114/60   11/18/18 06:35


 


Pulse Ox  98   11/18/18 06:35








 Intake & Output











 11/17/18 11/18/18 11/18/18





 18:59 06:59 18:59


 


Intake Total 200 600 


 


Balance 200 600 


 


Intake:   


 


  Oral 200 600 


 


Other:   


 


  # Voids 2 2 


 


  # Bowel Movements 0  














- Labs


CBC & Chem 7: 


 11/16/18 08:01





 11/17/18 08:39


Labs: 


 Abnormal Lab Results - Last 24 Hours (Table)











  11/17/18 11/17/18 11/17/18 Range/Units





  08:39 08:39 12:05 


 


PT  32.8 H    (9.0-12.0)  sec


 


INR  3.7 H    (<1.2)  


 


BUN   87 H   (9-20)  mg/dL


 


Creatinine   2.23 H   (0.66-1.25)  mg/dL


 


Glucose   136 H   (74-99)  mg/dL


 


POC Glucose (mg/dL)    102 H  (75-99)  mg/dL


 


Calcium   8.3 L   (8.4-10.2)  mg/dL














  11/17/18 11/17/18 11/18/18 Range/Units





  17:06 21:00 07:03 


 


PT     (9.0-12.0)  sec


 


INR     (<1.2)  


 


BUN     (9-20)  mg/dL


 


Creatinine     (0.66-1.25)  mg/dL


 


Glucose     (74-99)  mg/dL


 


POC Glucose (mg/dL)  136 H  118 H  102 H  (75-99)  mg/dL


 


Calcium     (8.4-10.2)  mg/dL








 Microbiology - Last 24 Hours (Table)











 11/15/18 01:05 Blood Culture - Preliminary





 Blood    No Growth after 72 hours














Assessment and Plan


Plan: 





Assessment:


1.  Nonoliguric acute kidney injury mostly prerenal secondary to diuresis.  

Creatinine 2.23 today.  Urinalysis is benign.


2.  Chronic kidney disease stage III secondary to nephrosclerosis with baseline 

creatinine in the range of 1.5-1.7.


3.  Systolic CHF with ejection fraction of 20%.


4.  Volume overload.  Improving.  


5.  Hypertension with chronic kidney disease.  Controlled.


6.  Chronic kidney disease mineral bone disease maintained on calcitriol.





Plan:


I will change Lasix to 40 mg orally daily.


Maintain hydrochlorothiazide.


I will also hold lisinopril as his blood pressure is on the lower side.


Hydralazine has been discontinued.


Avoid nephrotoxins.


Repeat electrolytes in the morning.

## 2018-11-19 VITALS — TEMPERATURE: 98.3 F | HEART RATE: 72 BPM | DIASTOLIC BLOOD PRESSURE: 67 MMHG | SYSTOLIC BLOOD PRESSURE: 116 MMHG

## 2018-11-19 LAB
ANION GAP SERPL CALC-SCNC: 10 MMOL/L
BASOPHILS # BLD AUTO: 0.1 K/UL (ref 0–0.2)
BASOPHILS NFR BLD AUTO: 0 %
BUN SERPL-SCNC: 81 MG/DL (ref 9–20)
CALCIUM SPEC-MCNC: 8.4 MG/DL (ref 8.4–10.2)
CHLORIDE SERPL-SCNC: 106 MMOL/L (ref 98–107)
CO2 SERPL-SCNC: 28 MMOL/L (ref 22–30)
EOSINOPHIL # BLD AUTO: 0.2 K/UL (ref 0–0.7)
EOSINOPHIL NFR BLD AUTO: 2 %
ERYTHROCYTE [DISTWIDTH] IN BLOOD BY AUTOMATED COUNT: 4.15 M/UL (ref 4.3–5.9)
ERYTHROCYTE [DISTWIDTH] IN BLOOD: 15.5 % (ref 11.5–15.5)
GLUCOSE BLD-MCNC: 84 MG/DL (ref 75–99)
GLUCOSE SERPL-MCNC: 100 MG/DL (ref 74–99)
HCT VFR BLD AUTO: 43.4 % (ref 39–53)
HGB BLD-MCNC: 13.7 GM/DL (ref 13–17.5)
INR PPP: 2.4 (ref ?–1.2)
LYMPHOCYTES # SPEC AUTO: 3.6 K/UL (ref 1–4.8)
LYMPHOCYTES NFR SPEC AUTO: 31 %
MAGNESIUM SPEC-SCNC: 2.3 MG/DL (ref 1.6–2.3)
MCH RBC QN AUTO: 33.1 PG (ref 25–35)
MCHC RBC AUTO-ENTMCNC: 31.6 G/DL (ref 31–37)
MCV RBC AUTO: 104.7 FL (ref 80–100)
MONOCYTES # BLD AUTO: 0.5 K/UL (ref 0–1)
MONOCYTES NFR BLD AUTO: 4 %
NEUTROPHILS # BLD AUTO: 6.9 K/UL (ref 1.3–7.7)
NEUTROPHILS NFR BLD AUTO: 61 %
PLATELET # BLD AUTO: 252 K/UL (ref 150–450)
POTASSIUM SERPL-SCNC: 4.2 MMOL/L (ref 3.5–5.1)
PT BLD: 21.7 SEC (ref 9–12)
SODIUM SERPL-SCNC: 144 MMOL/L (ref 137–145)
WBC # BLD AUTO: 11.5 K/UL (ref 3.8–10.6)

## 2018-11-19 RX ADMIN — FUROSEMIDE SCH MG: 40 TABLET ORAL at 08:16

## 2018-11-19 RX ADMIN — POTASSIUM CHLORIDE SCH MEQ: 20 TABLET, EXTENDED RELEASE ORAL at 08:15

## 2018-11-19 RX ADMIN — FAMOTIDINE SCH MG: 20 TABLET, FILM COATED ORAL at 08:15

## 2018-11-19 RX ADMIN — THERA TABS SCH EACH: TAB at 08:15

## 2018-11-19 RX ADMIN — METOPROLOL TARTRATE SCH MG: 50 TABLET, FILM COATED ORAL at 08:15

## 2018-11-19 RX ADMIN — IPRATROPIUM BROMIDE AND ALBUTEROL SULFATE SCH: .5; 3 SOLUTION RESPIRATORY (INHALATION) at 07:32

## 2018-11-19 RX ADMIN — INSULIN ASPART SCH: 100 INJECTION, SOLUTION INTRAVENOUS; SUBCUTANEOUS at 08:16

## 2018-11-19 RX ADMIN — AZITHROMYCIN SCH MG: 500 TABLET, FILM COATED ORAL at 06:03

## 2018-11-19 RX ADMIN — NITROGLYCERIN SCH MG: 2.5 CAPSULE ORAL at 08:16

## 2018-11-19 RX ADMIN — ALLOPURINOL SCH MG: 300 TABLET ORAL at 08:15

## 2018-11-19 RX ADMIN — HYDROCHLOROTHIAZIDE SCH MG: 25 TABLET ORAL at 08:16

## 2018-11-19 RX ADMIN — ATORVASTATIN CALCIUM SCH MG: 20 TABLET, FILM COATED ORAL at 08:16

## 2018-11-19 RX ADMIN — LEVOTHYROXINE SODIUM SCH MCG: 25 TABLET ORAL at 06:03

## 2018-11-19 RX ADMIN — IPRATROPIUM BROMIDE AND ALBUTEROL SULFATE SCH ML: .5; 3 SOLUTION RESPIRATORY (INHALATION) at 10:46

## 2018-11-19 RX ADMIN — FINASTERIDE SCH MG: 5 TABLET, FILM COATED ORAL at 08:16

## 2018-11-19 RX ADMIN — NITROGLYCERIN SCH MG: 6.5 CAPSULE ORAL at 08:16

## 2018-11-19 NOTE — P.PN
Subjective





Patient is seen in follow-up for acute kidney injury on chronic kidney disease.

  Patient has chronic kidney disease stage III with baseline creatinine near 1.5

-1.7 secondary to nephrosclerosis.  Patient presented with dyspnea and lower 

extremity edema.  Patient states edema has improved.  He admits to good urine 

output.  Creatinine 2.16 as of yesterday.  He is maintained on Lasix 40 mg PO 

daily along with hydrochlorothiazide.  No vomiting or diarrhea.  





Vital signs are stable.


General: The patient appeared well nourished and normally developed. 


HEENT: Head exam is unremarkable. Neck is without jugular venous distension.


LUNGS: Lungs are clear to auscultation and percussion. Breath sounds decreased.


HEART: Rate and Rhythm are regular. First and second heart sounds normal. No 

murmurs, rubs or gallops. 


ABDOMEN: Abdominal exam reveals normal bowel sounds. Non-tender and non-

distended. No evidence of peritonitis.


EXTREMITITES: 1+ edema.





Objective





- Vital Signs


Vital signs: 


 Vital Signs











Temp  98.3 F   11/19/18 07:06


 


Pulse  72   11/19/18 07:06


 


Resp  16   11/19/18 07:53


 


BP  116/67   11/19/18 07:06


 


Pulse Ox  96   11/19/18 07:06








 Intake & Output











 11/18/18 11/19/18 11/19/18





 18:59 06:59 18:59


 


Intake Total 400  


 


Balance 400  


 


Intake:   


 


  Oral 400  


 


Other:   


 


  Voiding Method Toilet  


 


  # Voids 3 1 


 


  # Bowel Movements 0 0 














- Labs


CBC & Chem 7: 


 11/16/18 08:01





 11/18/18 08:02


Labs: 


 Abnormal Lab Results - Last 24 Hours (Table)











  11/18/18 11/18/18 11/18/18 Range/Units





  08:02 08:02 17:08 


 


PT  27.9 H    (9.0-12.0)  sec


 


INR  3.1 H    (<1.2)  


 


BUN   95 H   (9-20)  mg/dL


 


Creatinine   2.16 H   (0.66-1.25)  mg/dL


 


Glucose   112 H   (74-99)  mg/dL


 


POC Glucose (mg/dL)    106 H  (75-99)  mg/dL


 


Calcium   8.3 L   (8.4-10.2)  mg/dL


 


Magnesium   2.4 H   (1.6-2.3)  mg/dL














  11/18/18 Range/Units





  20:39 


 


PT   (9.0-12.0)  sec


 


INR   (<1.2)  


 


BUN   (9-20)  mg/dL


 


Creatinine   (0.66-1.25)  mg/dL


 


Glucose   (74-99)  mg/dL


 


POC Glucose (mg/dL)  104 H  (75-99)  mg/dL


 


Calcium   (8.4-10.2)  mg/dL


 


Magnesium   (1.6-2.3)  mg/dL








 Microbiology - Last 24 Hours (Table)











 11/15/18 01:05 Blood Culture - Preliminary





 Blood    No Growth after 96 hours














Assessment and Plan


Plan: 





Assessment:


1.  Nonoliguric acute kidney injury mostly prerenal secondary to diuresis.  

Creatinine 2.16 as of yesterday.  Urinalysis is benign.


2.  Chronic kidney disease stage III secondary to nephrosclerosis with baseline 

creatinine in the range of 1.5-1.7.


3.  Systolic CHF with ejection fraction of 20%.


4.  Volume overload.  Improving.  


5.  Hypertension with chronic kidney disease.  Controlled.


6.  Chronic kidney disease mineral bone disease maintained on calcitriol.





Plan:


Maintain Lasix 40 mg orally daily.


Maintain hydrochlorothiazide.


Continue tohold lisinopril as his blood pressure is on the lower side.


Hydralazine has been discontinued.


Avoid nephrotoxins.


Repeat electrolytes in the morning.

## 2018-11-19 NOTE — P.DS
Providers


Date of admission: 


11/15/18 02:37





Attending physician: 


Simone Villalta





Consults: 





 





11/15/18 02:37


Consult Physician Routine 


   Consulting Provider: Susan Velez


   Consult Reason/Comments: known


   Do you want consulting provider notified?: Yes











Primary care physician: 


Simone Villalta








- Discharge Diagnosis(es)


(1) Heart failure


Current Visit: Yes   Status: Acute   





(2) Acute bronchitis


Current Visit: Yes   Status: Acute   





(3) Community acquired pneumonia


Current Visit: Yes   Status: Acute   





(4) Hypoxia


Current Visit: Yes   Status: Acute   


Hospital Course: 





This discharge summary on a 73-year-old white male essentially admitted for 

acute renal failure with pneumonia and history of congestive heart failure.  

The patient was diuresed appropriately for several days.  Nephrology was 

consulted and titrate medications.  ACE inhibitor and hydralazine were 

discontinued secondary to low blood pressure and to avoid nephrotoxicity.  The 

patient is now stabilizing.  He has 1+ edema lower extremities but is breathing 

well.  We will go ahead and check renal function prior to discharge and the 

patient's follow-up with me in 1-2 days.


Patient Condition at Discharge: Stable





Plan - Discharge Summary


Discharge Rx Participant: Yes


New Discharge Prescriptions: 


New


   Azithromycin [Zithromax] 500 mg PO DAILY@0600 #3 tab





Continue


   Potassium Chloride [Klor-Con 20] 20 meq PO DAILY


   Levothyroxine Sodium [Synthroid] 25 mcg PO DAILY


   Famotidine [Pepcid] 20 mg PO DAILY


   Atorvastatin [Lipitor] 20 mg PO DAILY


   glipiZIDE [Glucotrol] 2.5 mg PO BID


   Warfarin Sodium 5 mg PO SUWE


   Nitroglycerin 9 mg PO BID


   Multivitamin [Men's Multi-Vitamin] 1 tab PO DAILY


   Metoprolol Tartrate [Lopressor] 150 mg PO BID


   Hydrochlorothiazide 25 mg PO DAILY


   Furosemide [Lasix] 40 mg PO DAILY


   Finasteride [Proscar] 5 mg PO DAILY


   Calcitriol 0.25 mcg PO Q7D


   Allopurinol [Zyloprim] 300 mg PO DAILY


   Warfarin [Coumadin] 2.5 mg PO MOTUTHFRSA





Discontinued


   hydrALAZINE HCL 50 mg PO DAILY


   Benazepril HCl 20 mg PO BID


Discharge Medication List





Allopurinol [Zyloprim] 300 mg PO DAILY 04/09/18 [History]


Atorvastatin [Lipitor] 20 mg PO DAILY 04/09/18 [History]


Calcitriol 0.25 mcg PO Q7D 04/09/18 [History]


Famotidine [Pepcid] 20 mg PO DAILY 04/09/18 [History]


Finasteride [Proscar] 5 mg PO DAILY 04/09/18 [History]


Furosemide [Lasix] 40 mg PO DAILY 04/09/18 [History]


Hydrochlorothiazide 25 mg PO DAILY 04/09/18 [History]


Levothyroxine Sodium [Synthroid] 25 mcg PO DAILY 04/09/18 [History]


Metoprolol Tartrate [Lopressor] 150 mg PO BID 04/09/18 [History]


Multivitamin [Men's Multi-Vitamin] 1 tab PO DAILY 04/09/18 [History]


Nitroglycerin 9 mg PO BID 04/09/18 [History]


Potassium Chloride [Klor-Con 20] 20 meq PO DAILY 04/09/18 [History]


Warfarin Sodium 5 mg PO SUWE 04/09/18 [History]


glipiZIDE [Glucotrol] 2.5 mg PO BID 04/09/18 [History]


Warfarin [Coumadin] 2.5 mg PO MOTUTHFRSA 11/15/18 [History]


Azithromycin [Zithromax] 500 mg PO DAILY@0600 #3 tab 11/19/18 [Rx]








Follow up Appointment(s)/Referral(s): 


Simone Villalta MD [Primary Care Provider] - 1-2 Days


Discharge Disposition: HOME SELF-CARE

## 2019-07-19 ENCOUNTER — HOSPITAL ENCOUNTER (OUTPATIENT)
Dept: HOSPITAL 47 - LABWHC1 | Age: 74
Discharge: HOME | End: 2019-07-19
Attending: NURSE PRACTITIONER
Payer: MEDICARE

## 2019-07-19 DIAGNOSIS — N39.0: ICD-10-CM

## 2019-07-19 DIAGNOSIS — D63.1: ICD-10-CM

## 2019-07-19 DIAGNOSIS — N18.3: Primary | ICD-10-CM

## 2019-07-19 DIAGNOSIS — E21.3: ICD-10-CM

## 2019-07-19 DIAGNOSIS — M10.9: ICD-10-CM

## 2019-07-19 LAB
ALBUMIN SERPL-MCNC: 4 G/DL (ref 3.8–4.9)
ANION GAP SERPL CALC-SCNC: 7.7 MMOL/L (ref 4–12)
BASOPHILS # BLD AUTO: 0 K/UL (ref 0–0.2)
BASOPHILS NFR BLD AUTO: 0 %
BUN SERPL-SCNC: 41 MG/DL (ref 9–27)
BUN/CREAT SERPL: 29.29 RATIO (ref 12–20)
CALCIUM SPEC-MCNC: 8.5 MG/DL (ref 8.7–10.3)
CHLORIDE SERPL-SCNC: 111 MMOL/L (ref 96–109)
CO2 SERPL-SCNC: 28.3 MMOL/L (ref 21.6–31.8)
EOSINOPHIL # BLD AUTO: 0.2 K/UL (ref 0–0.7)
EOSINOPHIL NFR BLD AUTO: 2 %
ERYTHROCYTE [DISTWIDTH] IN BLOOD BY AUTOMATED COUNT: 3.99 M/UL (ref 4.3–5.9)
ERYTHROCYTE [DISTWIDTH] IN BLOOD: 14.8 % (ref 11.5–15.5)
GLUCOSE SERPL-MCNC: 138 MG/DL (ref 70–110)
HCT VFR BLD AUTO: 41.4 % (ref 39–53)
HGB BLD-MCNC: 13.2 GM/DL (ref 13–17.5)
LYMPHOCYTES # SPEC AUTO: 2.8 K/UL (ref 1–4.8)
LYMPHOCYTES NFR SPEC AUTO: 31 %
MAGNESIUM SPEC-SCNC: 2 MG/DL (ref 1.5–2.4)
MCH RBC QN AUTO: 33 PG (ref 25–35)
MCHC RBC AUTO-ENTMCNC: 31.8 G/DL (ref 31–37)
MCV RBC AUTO: 103.7 FL (ref 80–100)
MONOCYTES # BLD AUTO: 0.4 K/UL (ref 0–1)
MONOCYTES NFR BLD AUTO: 5 %
NEUTROPHILS # BLD AUTO: 5.4 K/UL (ref 1.3–7.7)
NEUTROPHILS NFR BLD AUTO: 58 %
PLATELET # BLD AUTO: 219 K/UL (ref 150–450)
POTASSIUM SERPL-SCNC: 4.2 MMOL/L (ref 3.5–5.5)
PTH-INTACT SERPL-MCNC: 85.4 PG/ML (ref 14–72)
SODIUM SERPL-SCNC: 147 MMOL/L (ref 135–145)
URATE SERPL-MCNC: 4.8 MG/DL (ref 3.7–8.7)
WBC # BLD AUTO: 9.2 K/UL (ref 3.8–10.6)

## 2019-07-19 PROCEDURE — 84100 ASSAY OF PHOSPHORUS: CPT

## 2019-07-19 PROCEDURE — 36415 COLL VENOUS BLD VENIPUNCTURE: CPT

## 2019-07-19 PROCEDURE — 83970 ASSAY OF PARATHORMONE: CPT

## 2019-07-19 PROCEDURE — 82306 VITAMIN D 25 HYDROXY: CPT

## 2019-07-19 PROCEDURE — 83735 ASSAY OF MAGNESIUM: CPT

## 2019-07-19 PROCEDURE — 84550 ASSAY OF BLOOD/URIC ACID: CPT

## 2019-07-19 PROCEDURE — 83550 IRON BINDING TEST: CPT

## 2019-07-19 PROCEDURE — 81003 URINALYSIS AUTO W/O SCOPE: CPT

## 2019-07-19 PROCEDURE — 82040 ASSAY OF SERUM ALBUMIN: CPT

## 2019-07-19 PROCEDURE — 82728 ASSAY OF FERRITIN: CPT

## 2019-07-19 PROCEDURE — 85025 COMPLETE CBC W/AUTO DIFF WBC: CPT

## 2019-07-19 PROCEDURE — 83540 ASSAY OF IRON: CPT

## 2019-07-19 PROCEDURE — 80048 BASIC METABOLIC PNL TOTAL CA: CPT

## 2019-07-20 LAB
PH UR: 5.5 [PH] (ref 5–8)
SP GR UR: 1.02 (ref 1–1.03)
UROBILINOGEN UR QL STRIP: <2 MG/DL (ref ?–2)

## 2019-10-11 ENCOUNTER — HOSPITAL ENCOUNTER (OUTPATIENT)
Dept: HOSPITAL 47 - RADECHMAIN | Age: 74
Discharge: HOME | End: 2019-10-11
Attending: FAMILY MEDICINE
Payer: MEDICARE

## 2019-10-11 DIAGNOSIS — Z86.73: ICD-10-CM

## 2019-10-11 DIAGNOSIS — I65.23: ICD-10-CM

## 2019-10-11 DIAGNOSIS — I08.1: Primary | ICD-10-CM

## 2019-10-11 DIAGNOSIS — R51: ICD-10-CM

## 2019-10-11 DIAGNOSIS — I27.20: ICD-10-CM

## 2019-10-11 DIAGNOSIS — G31.1: ICD-10-CM

## 2019-10-11 PROCEDURE — 70450 CT HEAD/BRAIN W/O DYE: CPT

## 2019-10-11 PROCEDURE — 93880 EXTRACRANIAL BILAT STUDY: CPT

## 2019-10-11 PROCEDURE — 93306 TTE W/DOPPLER COMPLETE: CPT

## 2019-10-11 NOTE — ECHOF
Referral Reason:I49.9 arrythmia



MEASUREMENTS

--------

HEIGHT: 177.8 cm

WEIGHT: 111.1 kg

BP: 121/76

RVIDd:   3.4 cm     (< 3.3)

IVSd:   1.3 cm     (0.6 - 1.1)

LVIDd:   6.4 cm     (3.9 - 5.3)

LVPWd:   1.3 cm     (0.6 - 1.1)

IVSs:   1.8 cm

LVIDs:   5.0 cm

LVPWs:   1.6 cm

LA Diam:   4.4 cm     (2.7 - 3.8)

LAESV Index (A-L):   46.90 ml/m

Ao Diam:   3.4 cm     (2.0 - 3.7)

AV Cusp:   2.1 cm     (1.5 - 2.6)

EPSS:   2.2 cm

RAP:   5.00 mmHg

RVSP:   51.46 mmHg

MV EF SLOPE:   46.68 mm/s     (70 - 150)

MV EXCURSION:   1.10 cm     (> 18.000)







FINDINGS

--------

Paced rhythm.

This was a technically difficult study with suboptimal views.

The left ventricle is moderately dilated.   There is mild concentric left ventricular hypertrophy.   
Overall left ventricular systolic function is severely impaired with, an EF between 20 - 25 %.

The right ventricle is mildly enlarged.

LA is severely dilated >40 ml/m2

Interatrial and interventricular septum intact.

There is mild aortic valve sclerosis.

The mitral valve leaflets are mildly thickened.   Mild mitral annular calcification present.   Mild m
itral regurgitation is present.

Mild tricuspid regurgitation present.   There is moderate pulmonary hypertension.   The right ventric
ular systolic pressure, as measured by Doppler, is 51.46mmHg.

Trace/mild (physiologic)  pulmonic regurgitation.

The aortic root size is normal.

There is no pericardial effusion.

Lumason used



CONCLUSIONS

--------

1. Paced rhythm.

2. This was a technically difficult study with suboptimal views.

3. The left ventricle is moderately dilated.

4. There is mild concentric left ventricular hypertrophy.

5. Overall left ventricular systolic function is severely impaired with, an EF between 20 - 25 %.

6. The right ventricle is mildly enlarged.

7. LA is severely dilated >40 ml/m2

8. Lumason used

9. Interatrial and interventricular septum intact.

10. There is mild aortic valve sclerosis.

11. The mitral valve leaflets are mildly thickened.

12. Mild mitral annular calcification present.

13. Mild mitral regurgitation is present.

14. Mild tricuspid regurgitation present.

15. There is moderate pulmonary hypertension.

16. The right ventricular systolic pressure, as measured by Doppler, is 51.46mmHg.

17. Trace/mild (physiologic)  pulmonic regurgitation.

18. The aortic root size is normal.

19. There is no pericardial effusion.





SONOGRAPHER: RADAMES High

## 2019-10-11 NOTE — CT
EXAMINATION TYPE: CT brain wo con

 

DATE OF EXAM: 10/11/2019

 

HISTORY: Headache with TIA like symptoms.

 

CT DLP: 1192 mGycm.  Automated Exposure Control for Dose Reduction was Utilized.

 

TECHNIQUE: CT scan of the head is performed without contrast.

 

COMPARISON: CT brain July 3, 2018.

 

FINDINGS:   There is no acute intracranial hemorrhage or midline shift identified. There is diffuse v
entricular and sulcal prominence consistent with diffuse age-related cerebral atrophy. The gray-white
 matter differentiation is fairly well preserved. There remains near complete opacification visualize
d portion of right maxillary sinus. Remainder paranasal sinuses are clear.   Soft tissue of the bilat
erally external auditory canals is thought to reflect cerumen.

 

IMPRESSION:  No acute intracranial hemorrhage or midline shift.  There is mild to moderate diffuse ag
e-related cerebral atrophy redemonstrated.  No significant change from prior.

## 2019-10-11 NOTE — US
EXAMINATION TYPE: US carotid duplex BILAT

 

DATE OF EXAM: 10/11/2019

 

COMPARISON: US 2018

 

CLINICAL HISTORY: R51 headache w/TIA symptoms. Dizziness, loss of balance, HTN, DM

 

EXAM MEASUREMENTS: 

 

RIGHT:  Peak Systolic Velocity (PSV) cm/sec

----- Right CCA:  54.9  

----- Right ICA:  77.4     

----- Right ECA:  85.5   

ICA/CCA ratio:  1.4    

 

RIGHT:  End Diastole cm/sec

----- Right CCA:  13.0   

----- Right ICA:  34.2      

----- Right ECA:  13.8     

 

LEFT:  Peak Systolic Velocity (PSV) cm/sec

----- Left CCA:  58.2  

----- Left ICA:  80.2   

----- Left ECA:  63.1  

ICA/CCA ratio:  1.4  

 

LEFT:  End Diastole cm/sec

----- Left CCA:  17.8  

----- Left ICA:  26.8   

----- Left ECA:  8.7 

 

VERTEBRALS (direction of flow):

Right Vertebral: Antegrade

Left Vertebral: Antegrade

 

Rhythm:  arrhythmia

 

No elevated velocities, no significant stenosis

 

 

IMPRESSION:  

1. Mild degree of grayscale atheromatous plaquing with no sonographically evident hemodynamically sig
nificant stenosis within either visualized carotid arterial system.

2. Incidentally noted cardiac arrhythmia.   

 

 

Criteria for Assigning % of Stenosis / Diameter reduction

(Estimation based on the indirect measurements of the internal carotid artery velocities (ICA PSV).

1.  Normal (no stenosis)=ICA PSV < 125 cm/s: ratio < 2.0: ICA EDV<40 cm/s.

2. Less than 50% stenosis=ICA PSV < 125 cm/s: ratio < 2.0: ICA EDV<40 cm/s.

3.  50 to 69% stenosis=ICA PSV of 125 to 230 cm/s: ration 2.0 ? 4.0: ICA EDV  cm/s.

4.  Greater than 70% stenosis to near occlusion= ICA PSV > 230 cm/s: ratio > 4.0: ICA EDV > 100 cm/s.
 

5.  Near occlusion= ICA PSV velocities may be low or undetectable: variable ratio and ICA EDV.

6.  Total occlusion=unable to detect flow.

## 2019-12-26 ENCOUNTER — HOSPITAL ENCOUNTER (EMERGENCY)
Dept: HOSPITAL 47 - EC | Age: 74
Discharge: HOME | End: 2019-12-26
Payer: MEDICARE

## 2019-12-26 VITALS — RESPIRATION RATE: 18 BRPM

## 2019-12-26 VITALS — SYSTOLIC BLOOD PRESSURE: 127 MMHG | DIASTOLIC BLOOD PRESSURE: 86 MMHG | TEMPERATURE: 98 F | HEART RATE: 75 BPM
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
## 2019-12-26 DIAGNOSIS — S60.812A: Primary | ICD-10-CM

## 2019-12-26 DIAGNOSIS — I10: ICD-10-CM

## 2019-12-26 DIAGNOSIS — E11.9: ICD-10-CM

## 2019-12-26 DIAGNOSIS — I25.2: ICD-10-CM

## 2019-12-26 DIAGNOSIS — Z79.899: ICD-10-CM

## 2019-12-26 DIAGNOSIS — R63.0: ICD-10-CM

## 2019-12-26 DIAGNOSIS — Z79.84: ICD-10-CM

## 2019-12-26 DIAGNOSIS — K59.00: ICD-10-CM

## 2019-12-26 DIAGNOSIS — G61.0: ICD-10-CM

## 2019-12-26 DIAGNOSIS — E78.5: ICD-10-CM

## 2019-12-26 DIAGNOSIS — E07.9: ICD-10-CM

## 2019-12-26 DIAGNOSIS — K21.9: ICD-10-CM

## 2019-12-26 DIAGNOSIS — Z87.891: ICD-10-CM

## 2019-12-26 DIAGNOSIS — Z79.890: ICD-10-CM

## 2019-12-26 DIAGNOSIS — R11.0: ICD-10-CM

## 2019-12-26 DIAGNOSIS — R29.6: ICD-10-CM
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
## 2019-12-26 DIAGNOSIS — Z95.0: ICD-10-CM

## 2019-12-26 DIAGNOSIS — Z79.01: ICD-10-CM

## 2019-12-26 LAB
ALBUMIN SERPL-MCNC: 3.9 G/DL (ref 3.5–5)
ALP SERPL-CCNC: 52 U/L (ref 38–126)
ALT SERPL-CCNC: 16 U/L (ref 4–49)
AMYLASE SERPL-CCNC: 130 U/L (ref 30–110)
ANION GAP SERPL CALC-SCNC: 8 MMOL/L
AST SERPL-CCNC: 46 U/L (ref 17–59)
BASOPHILS # BLD AUTO: 0.2 K/UL (ref 0–0.2)
BASOPHILS NFR BLD AUTO: 1 %
BUN SERPL-SCNC: 66 MG/DL (ref 9–20)
CALCIUM SPEC-MCNC: 8.5 MG/DL (ref 8.4–10.2)
CHLORIDE SERPL-SCNC: 107 MMOL/L (ref 98–107)
CK SERPL-CCNC: 54 U/L (ref 55–170)
CO2 SERPL-SCNC: 30 MMOL/L (ref 22–30)
EOSINOPHIL # BLD AUTO: 0.4 K/UL (ref 0–0.7)
EOSINOPHIL NFR BLD AUTO: 3 %
ERYTHROCYTE [DISTWIDTH] IN BLOOD BY AUTOMATED COUNT: 4.23 M/UL (ref 4.3–5.9)
ERYTHROCYTE [DISTWIDTH] IN BLOOD: 14.5 % (ref 11.5–15.5)
GLUCOSE SERPL-MCNC: 72 MG/DL (ref 74–99)
HCT VFR BLD AUTO: 43.9 % (ref 39–53)
HGB BLD-MCNC: 14 GM/DL (ref 13–17.5)
LYMPHOCYTES # SPEC AUTO: 4.2 K/UL (ref 1–4.8)
LYMPHOCYTES NFR SPEC AUTO: 34 %
MCH RBC QN AUTO: 33.2 PG (ref 25–35)
MCHC RBC AUTO-ENTMCNC: 32 G/DL (ref 31–37)
MCV RBC AUTO: 103.8 FL (ref 80–100)
MONOCYTES # BLD AUTO: 0.7 K/UL (ref 0–1)
MONOCYTES NFR BLD AUTO: 6 %
NEUTROPHILS # BLD AUTO: 6.6 K/UL (ref 1.3–7.7)
NEUTROPHILS NFR BLD AUTO: 53 %
PH UR: 5 [PH] (ref 5–8)
PLATELET # BLD AUTO: 255 K/UL (ref 150–450)
POTASSIUM SERPL-SCNC: 5.2 MMOL/L (ref 3.5–5.1)
PROT SERPL-MCNC: 7 G/DL (ref 6.3–8.2)
SODIUM SERPL-SCNC: 145 MMOL/L (ref 137–145)
SP GR UR: 1.01 (ref 1–1.03)
UROBILINOGEN UR QL STRIP: <2 MG/DL (ref ?–2)
WBC # BLD AUTO: 12.5 K/UL (ref 3.8–10.6)

## 2019-12-26 PROCEDURE — 82550 ASSAY OF CK (CPK): CPT

## 2019-12-26 PROCEDURE — 36415 COLL VENOUS BLD VENIPUNCTURE: CPT

## 2019-12-26 PROCEDURE — 84484 ASSAY OF TROPONIN QUANT: CPT

## 2019-12-26 PROCEDURE — 99284 EMERGENCY DEPT VISIT MOD MDM: CPT

## 2019-12-26 PROCEDURE — 81003 URINALYSIS AUTO W/O SCOPE: CPT

## 2019-12-26 PROCEDURE — 74176 CT ABD & PELVIS W/O CONTRAST: CPT

## 2019-12-26 PROCEDURE — 80053 COMPREHEN METABOLIC PANEL: CPT

## 2019-12-26 PROCEDURE — 83690 ASSAY OF LIPASE: CPT

## 2019-12-26 PROCEDURE — 83605 ASSAY OF LACTIC ACID: CPT

## 2019-12-26 PROCEDURE — 82150 ASSAY OF AMYLASE: CPT

## 2019-12-26 PROCEDURE — 85025 COMPLETE CBC W/AUTO DIFF WBC: CPT

## 2019-12-26 PROCEDURE — 96360 HYDRATION IV INFUSION INIT: CPT

## 2019-12-26 PROCEDURE — 96361 HYDRATE IV INFUSION ADD-ON: CPT

## 2019-12-26 NOTE — ED
Abdominal Pain HPI





- General


Chief Complaint: Fall


Stated Complaint: Frequent falls


Time Seen by Provider: 12/26/19 18:47


Source: patient, EMS


Mode of arrival: EMS


Limitations: no limitations





- History of Present Illness


Initial Comments: 





This is a 74-year-old male here for evaluation of recurrent falls.  Patient has 

multiple recent falls secondary to vertiginous symptoms and is complaining of 

abdominal pain about a minute started from a fall about a week ago.  The bowel. 

The persistent but no blood in the urine no blood in the stool no nausea vomi

ting.  Patient does have decreased bowel movements does admit to that increasing

bloating.  Patient is recent change in appetite or diet.  No fevers no cough 

congestion or sick contacts.  No change in medications.  Patient's been here for

evaluation abdominal pain.  Also does note that he had a scratch from a near 

fall or a brace his arm against the counter prior to arrival in his left wrist


MD Complaint: abdominal pain


-: week(s)


Location: diffuse


Radiation: none


Migration to: no migration


Quality: aching


Consistency: constant


Improves With: nothing


Worsens With: nothing


Associated Symptoms: nausea, anorexia





- Related Data


                                Home Medications











 Medication  Instructions  Recorded  Confirmed


 


Allopurinol [Zyloprim] 300 mg PO DAILY 04/09/18 11/15/18


 


Atorvastatin [Lipitor] 20 mg PO DAILY 04/09/18 11/15/18


 


Calcitriol 0.25 mcg PO Q7D 04/09/18 11/15/18


 


Famotidine [Pepcid] 20 mg PO DAILY 04/09/18 11/15/18


 


Finasteride [Proscar] 5 mg PO DAILY 04/09/18 11/15/18


 


Furosemide [Lasix] 40 mg PO DAILY 04/09/18 11/15/18


 


Hydrochlorothiazide 25 mg PO DAILY 04/09/18 11/15/18


 


Levothyroxine Sodium [Synthroid] 25 mcg PO DAILY 04/09/18 11/15/18


 


Metoprolol Tartrate [Lopressor] 150 mg PO BID 04/09/18 11/15/18


 


Multivitamin [Men's Multi-Vitamin] 1 tab PO DAILY 04/09/18 11/15/18


 


Nitroglycerin 9 mg PO BID 04/09/18 11/15/18


 


Potassium Chloride [Klor-Con 20] 20 meq PO DAILY 04/09/18 11/15/18


 


Warfarin Sodium 5 mg PO SUWE 04/09/18 11/15/18


 


glipiZIDE [Glucotrol] 2.5 mg PO BID 04/09/18 11/15/18


 


Warfarin [Coumadin] 2.5 mg PO MOTUTHFRSA 11/15/18 11/15/18








                                  Previous Rx's











 Medication  Instructions  Recorded


 


Azithromycin [Zithromax] 500 mg PO DAILY@0600 #3 tab 11/19/18


 


Polyethylene Glycol 3350 [Miralax] 17 gm PO DAILY #14 packet 12/26/19











                                    Allergies











Allergy/AdvReac Type Severity Reaction Status Date / Time


 


No Known Allergies Allergy   Verified 11/15/18 00:27














Review of Systems


ROS Statement: 


Those systems with pertinent positive or pertinent negative responses have been 

documented in the HPI.





ROS Other: All systems not noted in ROS Statement are negative.





Past Medical History


Past Medical History: Diabetes Mellitus, Eye Disorder, GERD/Reflux, 

Hyperlipidemia, Hypertension, Myocardial Infarction (MI), Thyroid Disorder


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 

CATARACT.  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 

resolved


Last Myocardial Infarction Date:: 1993


History of Any Multi-Drug Resistant Organisms: None Reported


Past Surgical History: AICD, Heart Catheterization With Stent, Pacemaker


Additional Past Surgical History / Comment(s): SEVERAL CARDIOVERSION.  LT 

CATARACT REMOVED


Past Anesthesia/Blood Transfusion Reactions: No Reported Reaction


Date of Last Stent Placement:: 1993


Type of Cardiac Device: Permanent Pacemaker, AICD


Device Placement Date:: 1994, 11/2010


Past Psychological History: Anxiety


Smoking Status: Former smoker


Past Alcohol Use History: Rare


Past Drug Use History: None Reported





- Past Family History


  ** Mother


Additional Family Medical History / Comment(s): SEVERE BLE LYMPH EDEMA





General Exam


Limitations: no limitations


General appearance: alert, in no apparent distress


Head exam: Present: atraumatic, normocephalic, normal inspection


Eye exam: Present: normal appearance, PERRL, EOMI.  Absent: scleral icterus, 

conjunctival injection, periorbital swelling


ENT exam: Present: normal exam, mucous membranes moist


Neck exam: Present: normal inspection.  Absent: tenderness, meningismus, lymphad

enopathy


Respiratory exam: Present: normal lung sounds bilaterally.  Absent: respiratory 

distress, wheezes, rales, rhonchi, stridor


Cardiovascular Exam: Present: regular rate, normal rhythm, normal heart sounds. 

 Absent: systolic murmur, diastolic murmur, rubs, gallop, clicks


GI/Abdominal exam: Present: soft, normal bowel sounds.  Absent: distended, 

tenderness, guarding, rebound, rigid


Extremities exam: Present: normal inspection, full ROM, normal capillary refill.

  Absent: tenderness, pedal edema, joint swelling, calf tenderness


Back exam: Present: normal inspection


Neurological exam: Present: alert, oriented X3, CN II-XII intact


Psychiatric exam: Present: normal affect, normal mood


Skin exam: Present: warm, dry, intact, normal color.  Absent: rash





Course


                                   Vital Signs











  12/26/19 12/26/19 12/26/19





  18:47 19:45 20:58


 


Temperature 97.8 F  


 


Pulse Rate 73 74 70


 


Respiratory 18 18 18





Rate   


 


Blood Pressure 119/72 124/86 120/82


 


O2 Sat by Pulse 100 100 100





Oximetry   














- Reevaluation(s)


Reevaluation #1: 





12/26/19 22:22


Record has been reviewed


Reevaluation #2: 





12/26/19 22:22


This with patient and family need for increased improved bowel regimen.  Patient

 will be given stool softener and can be discharged home





Medical Decision Making





- Medical Decision Making





74 male to the ER for evaluation and abdominal pain.  Also recent falls pain 

appears to falls due to constipation chronic in nature.  Patient will be given 

bowel regimen can be discharged home





- Lab Data


Result diagrams: 


                                 12/26/19 19:00





                                 12/26/19 19:00


                                   Lab Results











  12/26/19 12/26/19 12/26/19 Range/Units





  19:00 19:00 19:00 


 


WBC   12.5 H   (3.8-10.6)  k/uL


 


RBC   4.23 L   (4.30-5.90)  m/uL


 


Hgb   14.0   (13.0-17.5)  gm/dL


 


Hct   43.9   (39.0-53.0)  %


 


MCV   103.8 H   (80.0-100.0)  fL


 


MCH   33.2   (25.0-35.0)  pg


 


MCHC   32.0   (31.0-37.0)  g/dL


 


RDW   14.5   (11.5-15.5)  %


 


Plt Count   255   (150-450)  k/uL


 


Neutrophils %   53   %


 


Lymphocytes %   34   %


 


Monocytes %   6   %


 


Eosinophils %   3   %


 


Basophils %   1   %


 


Neutrophils #   6.6   (1.3-7.7)  k/uL


 


Lymphocytes #   4.2   (1.0-4.8)  k/uL


 


Monocytes #   0.7   (0-1.0)  k/uL


 


Eosinophils #   0.4   (0-0.7)  k/uL


 


Basophils #   0.2   (0-0.2)  k/uL


 


Macrocytosis   Slight   


 


Sodium  145    (137-145)  mmol/L


 


Potassium  5.2 H    (3.5-5.1)  mmol/L


 


Chloride  107    ()  mmol/L


 


Carbon Dioxide  30    (22-30)  mmol/L


 


Anion Gap  8    mmol/L


 


BUN  66 H    (9-20)  mg/dL


 


Creatinine  1.98 H    (0.66-1.25)  mg/dL


 


Est GFR (CKD-EPI)AfAm  37    (>60 ml/min/1.73 sqM)  


 


Est GFR (CKD-EPI)NonAf  32    (>60 ml/min/1.73 sqM)  


 


Glucose  72 L    (74-99)  mg/dL


 


Plasma Lactic Acid Ross    1.4  (0.7-2.0)  mmol/L


 


Calcium  8.5    (8.4-10.2)  mg/dL


 


Total Bilirubin  1.0    (0.2-1.3)  mg/dL


 


AST  46    (17-59)  U/L


 


ALT  16    (4-49)  U/L


 


Alkaline Phosphatase  52    ()  U/L


 


Creatine Kinase  54 L    ()  U/L


 


Troponin I     (0.000-0.034)  ng/mL


 


Total Protein  7.0    (6.3-8.2)  g/dL


 


Albumin  3.9    (3.5-5.0)  g/dL


 


Amylase  130 H    ()  U/L


 


Lipase  647 H    ()  U/L


 


Urine Color     


 


Urine Appearance     (Clear)  


 


Urine pH     (5.0-8.0)  


 


Ur Specific Gravity     (1.001-1.035)  


 


Urine Protein     (Negative)  


 


Urine Glucose (UA)     (Negative)  


 


Urine Ketones     (Negative)  


 


Urine Blood     (Negative)  


 


Urine Nitrite     (Negative)  


 


Urine Bilirubin     (Negative)  


 


Urine Urobilinogen     (<2.0)  mg/dL


 


Ur Leukocyte Esterase     (Negative)  














  12/26/19 12/26/19 Range/Units





  19:00 19:20 


 


WBC    (3.8-10.6)  k/uL


 


RBC    (4.30-5.90)  m/uL


 


Hgb    (13.0-17.5)  gm/dL


 


Hct    (39.0-53.0)  %


 


MCV    (80.0-100.0)  fL


 


MCH    (25.0-35.0)  pg


 


MCHC    (31.0-37.0)  g/dL


 


RDW    (11.5-15.5)  %


 


Plt Count    (150-450)  k/uL


 


Neutrophils %    %


 


Lymphocytes %    %


 


Monocytes %    %


 


Eosinophils %    %


 


Basophils %    %


 


Neutrophils #    (1.3-7.7)  k/uL


 


Lymphocytes #    (1.0-4.8)  k/uL


 


Monocytes #    (0-1.0)  k/uL


 


Eosinophils #    (0-0.7)  k/uL


 


Basophils #    (0-0.2)  k/uL


 


Macrocytosis    


 


Sodium    (137-145)  mmol/L


 


Potassium    (3.5-5.1)  mmol/L


 


Chloride    ()  mmol/L


 


Carbon Dioxide    (22-30)  mmol/L


 


Anion Gap    mmol/L


 


BUN    (9-20)  mg/dL


 


Creatinine    (0.66-1.25)  mg/dL


 


Est GFR (CKD-EPI)AfAm    (>60 ml/min/1.73 sqM)  


 


Est GFR (CKD-EPI)NonAf    (>60 ml/min/1.73 sqM)  


 


Glucose    (74-99)  mg/dL


 


Plasma Lactic Acid Ross    (0.7-2.0)  mmol/L


 


Calcium    (8.4-10.2)  mg/dL


 


Total Bilirubin    (0.2-1.3)  mg/dL


 


AST    (17-59)  U/L


 


ALT    (4-49)  U/L


 


Alkaline Phosphatase    ()  U/L


 


Creatine Kinase    ()  U/L


 


Troponin I  0.019   (0.000-0.034)  ng/mL


 


Total Protein    (6.3-8.2)  g/dL


 


Albumin    (3.5-5.0)  g/dL


 


Amylase    ()  U/L


 


Lipase    ()  U/L


 


Urine Color   Yellow  


 


Urine Appearance   Clear  (Clear)  


 


Urine pH   5.0  (5.0-8.0)  


 


Ur Specific Gravity   1.015  (1.001-1.035)  


 


Urine Protein   Negative  (Negative)  


 


Urine Glucose (UA)   Negative  (Negative)  


 


Urine Ketones   Negative  (Negative)  


 


Urine Blood   Negative  (Negative)  


 


Urine Nitrite   Negative  (Negative)  


 


Urine Bilirubin   Negative  (Negative)  


 


Urine Urobilinogen   <2.0  (<2.0)  mg/dL


 


Ur Leukocyte Esterase   Negative  (Negative)  














- Radiology Data


Radiology results: report reviewed (CT of the AbdPelvis positive for significant

 abdominal pain), image reviewed





Disposition


Clinical Impression: 


 Fall, Constipation, Abrasion of left wrist





Disposition: HOME SELF-CARE


Instructions (If sedation given, give patient instructions):  Constipation (ED),

 Fall Prevention for Older Adults (ED)


Prescriptions: 


Polyethylene Glycol 3350 [Miralax] 17 gm PO DAILY #14 packet


Is patient prescribed a controlled substance at d/c from ED?: No


Referrals: 


Simone Villalta MD [Primary Care Provider] - 1-2 days

## 2019-12-26 NOTE — CT
EXAMINATION TYPE: CT abdomen pelvis wo con

 

DATE OF EXAM: 12/26/2019

 

COMPARISON: None

 

HISTORY: Multiple falls and weakness.

 

CT DLP: 1462.6 mGycm

Automated exposure control for dose reduction was used.

Exam performed with no contrast.

 

Lung bases are clear of consolidation. Heart is moderately enlarged. Stomach is intact. Liver shows n
o focal defect. Gallbladder appears normal. Spleen is intact. There is no evidence of pancreatic mass
.

 

There is no adrenal mass. Kidneys have normal size. There is no hydronephrosis. Ureters are not dilat
ed. There is no retroperitoneal adenopathy. Bladder distends smoothly. There is no inguinal hernia. T
here is dilated rectum with fecal material. This measures up to 5 cm. There is no free fluid in the p
javier.

 

There is no mesenteric edema. There is no ascites or free air. There is no sign of a mechanical bowel
 obstruction. Appendix is not definitely seen. There is no sign of thickened appendix. Abdominal aort
a is atheromatous.

 

There are some spondylotic changes in the lumbar spine. There is narrowing of L3-4 disc. The bony pel
vis appears intact.

 

IMPRESSION:

There is evidence of constipation. No free air. No acute bony abnormality. No pelvic fracture.

## 2020-02-11 ENCOUNTER — HOSPITAL ENCOUNTER (OUTPATIENT)
Dept: HOSPITAL 47 - LABWHC1 | Age: 75
Discharge: HOME | End: 2020-02-11
Attending: PSYCHIATRY & NEUROLOGY
Payer: MEDICARE

## 2020-02-11 DIAGNOSIS — G62.9: Primary | ICD-10-CM

## 2020-02-11 LAB — VIT B12 SERPL-MCNC: 864 PG/ML (ref 200–944)

## 2020-02-11 PROCEDURE — 82607 VITAMIN B-12: CPT

## 2020-02-11 PROCEDURE — 36415 COLL VENOUS BLD VENIPUNCTURE: CPT

## 2020-02-11 PROCEDURE — 82746 ASSAY OF FOLIC ACID SERUM: CPT

## 2020-02-11 PROCEDURE — 86334 IMMUNOFIX E-PHORESIS SERUM: CPT

## 2020-02-11 PROCEDURE — 84165 PROTEIN E-PHORESIS SERUM: CPT

## 2020-02-12 LAB
ALBUMIN SERPL ELPH-MCNC: 3.45 G/DL (ref 3.8–4.9)
GAMMA GLOB SERPL ELPH-MCNC: 0.83 G/DL (ref 0.7–1.5)

## 2020-09-14 ENCOUNTER — HOSPITAL ENCOUNTER (OUTPATIENT)
Dept: HOSPITAL 47 - LABWHC1 | Age: 75
Discharge: HOME | End: 2020-09-14
Attending: PSYCHIATRY & NEUROLOGY
Payer: MEDICARE

## 2020-09-14 DIAGNOSIS — R53.83: Primary | ICD-10-CM

## 2020-09-14 DIAGNOSIS — N19: ICD-10-CM

## 2020-09-14 DIAGNOSIS — M10.9: ICD-10-CM

## 2020-09-14 DIAGNOSIS — R53.1: ICD-10-CM

## 2020-09-14 LAB
ALBUMIN SERPL-MCNC: 3.8 G/DL (ref 3.8–4.9)
ALBUMIN/GLOB SERPL: 2.11 G/DL (ref 1.6–3.17)
ALP SERPL-CCNC: 70 U/L (ref 41–126)
ALT SERPL-CCNC: 15 U/L (ref 10–49)
ANION GAP SERPL CALC-SCNC: 7.6 MMOL/L (ref 4–12)
AST SERPL-CCNC: 17 U/L (ref 14–35)
BUN SERPL-SCNC: 23 MG/DL (ref 9–27)
BUN/CREAT SERPL: 15.33 RATIO (ref 12–20)
CALCIUM SPEC-MCNC: 8.7 MG/DL (ref 8.7–10.3)
CHLORIDE SERPL-SCNC: 107 MMOL/L (ref 96–109)
CO2 SERPL-SCNC: 33.4 MMOL/L (ref 21.6–31.8)
ERYTHROCYTE [DISTWIDTH] IN BLOOD BY AUTOMATED COUNT: 4.12 M/UL (ref 4.3–5.9)
ERYTHROCYTE [DISTWIDTH] IN BLOOD: 15.2 % (ref 11.5–15.5)
GLOBULIN SER CALC-MCNC: 1.8 G/DL (ref 1.6–3.3)
GLUCOSE SERPL-MCNC: 114 MG/DL (ref 70–110)
HCT VFR BLD AUTO: 43.6 % (ref 39–53)
HGB BLD-MCNC: 14.1 GM/DL (ref 13–17.5)
MCH RBC QN AUTO: 34.2 PG (ref 25–35)
MCHC RBC AUTO-ENTMCNC: 32.2 G/DL (ref 31–37)
MCV RBC AUTO: 106 FL (ref 80–100)
PLATELET # BLD AUTO: 180 K/UL (ref 150–450)
POTASSIUM SERPL-SCNC: 4.1 MMOL/L (ref 3.5–5.5)
PROT SERPL-MCNC: 5.6 G/DL (ref 6.2–8.2)
SODIUM SERPL-SCNC: 148 MMOL/L (ref 135–145)
URATE SERPL-MCNC: 4.2 MG/DL (ref 3.7–8.7)
WBC # BLD AUTO: 8.8 K/UL (ref 3.8–10.6)

## 2020-09-14 PROCEDURE — 85027 COMPLETE CBC AUTOMATED: CPT

## 2020-09-14 PROCEDURE — 36415 COLL VENOUS BLD VENIPUNCTURE: CPT

## 2020-09-14 PROCEDURE — 84550 ASSAY OF BLOOD/URIC ACID: CPT

## 2020-09-14 PROCEDURE — 80053 COMPREHEN METABOLIC PANEL: CPT

## 2020-10-02 ENCOUNTER — HOSPITAL ENCOUNTER (OUTPATIENT)
Dept: HOSPITAL 47 - RADCTMAIN | Age: 75
Discharge: HOME | End: 2020-10-02
Attending: PSYCHIATRY & NEUROLOGY
Payer: MEDICARE

## 2020-10-02 DIAGNOSIS — G31.1: ICD-10-CM

## 2020-10-02 DIAGNOSIS — J32.0: Primary | ICD-10-CM

## 2020-10-02 PROCEDURE — 70450 CT HEAD/BRAIN W/O DYE: CPT

## 2020-10-02 NOTE — CT
EXAMINATION TYPE: CT brain wo con

 

DATE OF EXAM: 10/2/2020

 

HISTORY: confusion, head trauma 7 months ago

 

CT DLP: 1189 mGycm.  Automated Exposure Control for Dose Reduction was Utilized.

 

TECHNIQUE: CT scan of the head is performed without contrast.

 

COMPARISON: CT brain dated 10/11/2019.

 

FINDINGS:   There is no acute intracranial hemorrhage or midline shift identified. There is diffuse v
entricular and sulcal prominence consistent with diffuse age-related cerebral atrophy. Gray-white mat
ter differentiation fairly well-maintained. Right maxillary sinus remains nearly completely fluid-oscar
led. Remainder paranasal sinuses remain clear. Globes are intact bilaterally. 

 

IMPRESSION:  No acute intracranial hemorrhage or midline shift.  There is mild to moderate diffuse ce
rebral atrophy redemonstrated.  Right maxillary sinus disease redemonstrated. No significant change f
rom prior study.

## 2020-10-29 ENCOUNTER — HOSPITAL ENCOUNTER (OUTPATIENT)
Dept: HOSPITAL 47 - LABWHC1 | Age: 75
Discharge: HOME | End: 2020-10-29
Attending: NURSE PRACTITIONER
Payer: MEDICARE

## 2020-10-29 DIAGNOSIS — D63.1: ICD-10-CM

## 2020-10-29 DIAGNOSIS — N18.30: ICD-10-CM

## 2020-10-29 DIAGNOSIS — N25.81: ICD-10-CM

## 2020-10-29 DIAGNOSIS — E21.3: Primary | ICD-10-CM

## 2020-10-29 DIAGNOSIS — M10.9: ICD-10-CM

## 2020-10-29 DIAGNOSIS — N39.0: ICD-10-CM

## 2020-10-29 LAB
BASOPHILS # BLD AUTO: 0.1 K/UL (ref 0–0.2)
BASOPHILS NFR BLD AUTO: 1 %
EOSINOPHIL # BLD AUTO: 0.3 K/UL (ref 0–0.7)
EOSINOPHIL NFR BLD AUTO: 3 %
ERYTHROCYTE [DISTWIDTH] IN BLOOD BY AUTOMATED COUNT: 4.12 M/UL (ref 4.3–5.9)
ERYTHROCYTE [DISTWIDTH] IN BLOOD: 15.4 % (ref 11.5–15.5)
FERRITIN SERPL-MCNC: 85.2 NG/ML (ref 22–322)
HCT VFR BLD AUTO: 44.8 % (ref 39–53)
HGB BLD-MCNC: 13.7 GM/DL (ref 13–17.5)
LYMPHOCYTES # SPEC AUTO: 2.6 K/UL (ref 1–4.8)
LYMPHOCYTES NFR SPEC AUTO: 26 %
MCH RBC QN AUTO: 33.2 PG (ref 25–35)
MCHC RBC AUTO-ENTMCNC: 30.6 G/DL (ref 31–37)
MCV RBC AUTO: 108.7 FL (ref 80–100)
MONOCYTES # BLD AUTO: 0.5 K/UL (ref 0–1)
MONOCYTES NFR BLD AUTO: 5 %
NEUTROPHILS # BLD AUTO: 6.3 K/UL (ref 1.3–7.7)
NEUTROPHILS NFR BLD AUTO: 63 %
PLATELET # BLD AUTO: 220 K/UL (ref 150–450)
WBC # BLD AUTO: 10.1 K/UL (ref 3.8–10.6)

## 2020-10-29 PROCEDURE — 82728 ASSAY OF FERRITIN: CPT

## 2020-10-29 PROCEDURE — 84100 ASSAY OF PHOSPHORUS: CPT

## 2020-10-29 PROCEDURE — 83550 IRON BINDING TEST: CPT

## 2020-10-29 PROCEDURE — 80048 BASIC METABOLIC PNL TOTAL CA: CPT

## 2020-10-29 PROCEDURE — 36415 COLL VENOUS BLD VENIPUNCTURE: CPT

## 2020-10-29 PROCEDURE — 83970 ASSAY OF PARATHORMONE: CPT

## 2020-10-29 PROCEDURE — 85025 COMPLETE CBC W/AUTO DIFF WBC: CPT

## 2020-10-29 PROCEDURE — 82040 ASSAY OF SERUM ALBUMIN: CPT

## 2020-10-29 PROCEDURE — 83540 ASSAY OF IRON: CPT

## 2020-10-29 PROCEDURE — 83735 ASSAY OF MAGNESIUM: CPT

## 2020-10-29 PROCEDURE — 84550 ASSAY OF BLOOD/URIC ACID: CPT

## 2020-10-30 LAB
ALBUMIN SERPL-MCNC: 3.9 G/DL (ref 3.8–4.9)
ANION GAP SERPL CALC-SCNC: 11.8 MMOL/L (ref 4–12)
BUN SERPL-SCNC: 24 MG/DL (ref 9–27)
BUN/CREAT SERPL: 17.14 RATIO (ref 12–20)
CALCIUM SPEC-MCNC: 9.4 MG/DL (ref 8.7–10.3)
CHLORIDE SERPL-SCNC: 105 MMOL/L (ref 96–109)
CO2 SERPL-SCNC: 33.2 MMOL/L (ref 21.6–31.8)
GLUCOSE SERPL-MCNC: 103 MG/DL (ref 70–110)
IRON SERPL-MCNC: 119 UG/DL (ref 65–175)
MAGNESIUM SPEC-SCNC: 1.8 MG/DL (ref 1.5–2.4)
POTASSIUM SERPL-SCNC: 4.2 MMOL/L (ref 3.5–5.5)
SODIUM SERPL-SCNC: 150 MMOL/L (ref 135–145)
TIBC SERPL-MCNC: 318 UG/DL (ref 228–460)
URATE SERPL-MCNC: 4.7 MG/DL (ref 3.7–8.7)

## 2021-04-05 ENCOUNTER — HOSPITAL ENCOUNTER (OUTPATIENT)
Dept: HOSPITAL 47 - CATHEP | Age: 76
Discharge: HOME | End: 2021-04-05
Attending: INTERNAL MEDICINE
Payer: MEDICARE

## 2021-04-05 VITALS — RESPIRATION RATE: 16 BRPM | TEMPERATURE: 97.6 F

## 2021-04-05 VITALS — HEART RATE: 68 BPM | DIASTOLIC BLOOD PRESSURE: 80 MMHG | SYSTOLIC BLOOD PRESSURE: 128 MMHG

## 2021-04-05 VITALS — BODY MASS INDEX: 35.9 KG/M2

## 2021-04-05 DIAGNOSIS — Z95.5: ICD-10-CM

## 2021-04-05 DIAGNOSIS — I25.2: ICD-10-CM

## 2021-04-05 DIAGNOSIS — I45.10: ICD-10-CM

## 2021-04-05 DIAGNOSIS — I25.10: ICD-10-CM

## 2021-04-05 DIAGNOSIS — E78.5: ICD-10-CM

## 2021-04-05 DIAGNOSIS — E07.9: ICD-10-CM

## 2021-04-05 DIAGNOSIS — Z95.1: ICD-10-CM

## 2021-04-05 DIAGNOSIS — I48.19: ICD-10-CM

## 2021-04-05 DIAGNOSIS — Z79.899: ICD-10-CM

## 2021-04-05 DIAGNOSIS — I44.4: ICD-10-CM

## 2021-04-05 DIAGNOSIS — E78.00: ICD-10-CM

## 2021-04-05 DIAGNOSIS — I48.0: ICD-10-CM

## 2021-04-05 DIAGNOSIS — G61.0: ICD-10-CM

## 2021-04-05 DIAGNOSIS — I11.0: ICD-10-CM

## 2021-04-05 DIAGNOSIS — Z98.42: ICD-10-CM

## 2021-04-05 DIAGNOSIS — Z95.810: ICD-10-CM

## 2021-04-05 DIAGNOSIS — I50.22: ICD-10-CM

## 2021-04-05 DIAGNOSIS — Z87.891: ICD-10-CM

## 2021-04-05 DIAGNOSIS — I25.5: ICD-10-CM

## 2021-04-05 DIAGNOSIS — N42.9: ICD-10-CM

## 2021-04-05 DIAGNOSIS — E11.9: ICD-10-CM

## 2021-04-05 DIAGNOSIS — H26.9: ICD-10-CM

## 2021-04-05 DIAGNOSIS — R44.2: ICD-10-CM

## 2021-04-05 DIAGNOSIS — R41.3: ICD-10-CM

## 2021-04-05 DIAGNOSIS — T82.110A: Primary | ICD-10-CM

## 2021-04-05 DIAGNOSIS — Z95.0: ICD-10-CM

## 2021-04-05 DIAGNOSIS — Z79.84: ICD-10-CM

## 2021-04-05 DIAGNOSIS — G20: ICD-10-CM

## 2021-04-05 LAB
ANION GAP SERPL CALC-SCNC: 5 MMOL/L
BUN SERPL-SCNC: 27 MG/DL (ref 9–20)
CALCIUM SPEC-MCNC: 8.6 MG/DL (ref 8.4–10.2)
CHLORIDE SERPL-SCNC: 102 MMOL/L (ref 98–107)
CO2 SERPL-SCNC: 38 MMOL/L (ref 22–30)
GLUCOSE BLD-MCNC: 79 MG/DL (ref 75–99)
GLUCOSE SERPL-MCNC: 85 MG/DL (ref 74–99)
INR PPP: 3.7 (ref ?–1.2)
POTASSIUM SERPL-SCNC: 4.5 MMOL/L (ref 3.5–5.1)
PT BLD: 35.6 SEC (ref 9–12)
SODIUM SERPL-SCNC: 145 MMOL/L (ref 137–145)

## 2021-04-05 PROCEDURE — 36005 INJECTION EXT VENOGRAPHY: CPT

## 2021-04-05 PROCEDURE — 80048 BASIC METABOLIC PNL TOTAL CA: CPT

## 2021-04-05 PROCEDURE — 75820 VEIN X-RAY ARM/LEG: CPT

## 2021-04-05 PROCEDURE — 85610 PROTHROMBIN TIME: CPT

## 2021-04-05 NOTE — P.HPCAR
History of Present Illness





This is Dr. Cuellar dictating an H/P on this patient


The patient was interviewed and examined 





IMPRESSION / ASSESSMENT: 


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


Congestive heart failure class III, chronic


Persistent atrial fibrillation


Right bundle-branch block, left anterior fascicular block, QRS width 159 ms


High RV pacing percentage of greater than 50%


Dual-chamber ICD at [facility], [employer], [id number]





Type 2 diabetes


Hypertension


Underlying CAD, status post coronary artery bypass grafting





PLAN:


Left upper extremity venogram and cinefluoroscopy of the leads to assess patency

of the vein and integrity of leads


Plan is a possible obtained to a biventricular ICD since he has an underlying 

wide QRS, congestive heart failure, severe cardio myopathy 


with a high RV pacing percentage of greater than 50%





Stop oral potassium.  Start spironolactone 25 mg by mouth daily


Consider switching to long-acting metoprolol 200 mg by mouth daily after upgrade

to a biventricular ICD


Consideration for ENTRESTO in the future





HPI


Patient is dual-chamber ICD is at [facility].  He continues to have shortness of breath

with exertion which is chronic and unchanged from before.  No acute exacerbation


No chest discomfort no loss of consciousness


He is on guideline director medical treatment including benazepril, isosorbide, 

Lasix, atorvastatin and metoprolol





ROS: 


No fever chills or rigors, 


no cough, phlegm or expectoration, 


no nausea, vomiting or diarrhea, 


no hematuria, dysuria, 


no musculoskeletal complaints, 


no strokes or seizures, 


no skin lesions.





EXAMINATION:


Afebrile 97.63 Fahrenheit, blood pressure 138/83 mmHg, normal respirations


No JVD


Normal heart sounds irregular


Breath sounds are clear





REVIEW OF LABS, ECG & MEDICAL DATA


Labs are reviewed.  Sodium 145, potassium 4.5, BUN 27, creatinine 1.15


INR pending





Physical Exam


Vitals: 


                                   Vital Signs











  Temp Pulse Resp BP Pulse Ox


 


 04/05/21 09:49  97.6 F  86  16  138/83  92 L








                                Intake and Output











 04/04/21 04/05/21 04/05/21





 22:59 06:59 14:59


 


Intake Total   50


 


Balance   50


 


Intake:   


 


  IV   50


 


Other:   


 


  Weight   104.1 kg














Past Medical History


Past Medical History: Diabetes Mellitus, Eye Disorder, GERD/Reflux, 

Hyperlipidemia, Hypertension, Memory Impairment, Myocardial Infarction (MI), 

Prostate Disorder, Thyroid Disorder


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 

WITH HALLUCINATIONS.  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

and now resolved, SEE DR CUELLAR'S HISTORY AND PHYSICAL FOR CARDIAC HISTORY, 

CATARACT RIGHT EYE


Last Myocardial Infarction Date:: 1993


History of Any Multi-Drug Resistant Organisms: None Reported


Past Surgical History: AICD, Heart Catheterization With Stent, Pacemaker


Additional Past Surgical History / Comment(s): SEVERAL CARDIOVERSIONS.  LT 

CATARACT REMOVED


Past Anesthesia/Blood Transfusion Reactions: No Reported Reaction


Date of Last Stent Placement:: 1993


Type of Cardiac Device: Permanent Pacemaker, AICD


Device Placement Date:: 1994, 11/2010


Smoking Status: Former smoker





- Past Family History


  ** Mother


Additional Family Medical History / Comment(s): SEVERE LEG EDEMA





Physical Examination


                                   Vital Signs











  Temp Pulse Resp BP Pulse Ox


 


 04/05/21 09:49  97.6 F  86  16  138/83  92 L








                                Intake and Output











 04/04/21 04/05/21 04/05/21





 22:59 06:59 14:59


 


Intake Total   50


 


Balance   50


 


Intake:   


 


  IV   50


 


Other:   


 


  Weight   104.1 kg














Results





                                 04/05/21 09:30


                                   Coagulation











  04/05/21 Range/Units





  09:30 


 


PT  35.6 H  (9.0-12.0)  sec








                          Comprehensive Metabolic Panel











  04/05/21 Range/Units





  09:30 


 


Sodium  145  (137-145)  mmol/L


 


Potassium  4.5  (3.5-5.1)  mmol/L


 


Chloride  102  ()  mmol/L


 


Carbon Dioxide  38 H  (22-30)  mmol/L


 


BUN  27 H  (9-20)  mg/dL


 


Creatinine  1.15  (0.66-1.25)  mg/dL


 


Glucose  85  (74-99)  mg/dL


 


Calcium  8.6  (8.4-10.2)  mg/dL








                               Current Medications











Generic Name Dose Route Start Last Admin





  Trade Name Freq  PRN Reason Stop Dose Admin


 


Sodium Chloride  1,000 mls @ 20 mls/hr  04/05/21 07:21 





  Saline 0.9%  IV  05/05/21 07:22 





  .Q24H BARON  








                                Intake and Output











 04/04/21 04/05/21 04/05/21





 22:59 06:59 14:59


 


Intake Total   50


 


Balance   50


 


Intake:   


 


  IV   50


 


Other:   


 


  Weight   104.1 kg








                                 Patient Weight











 04/06/21





 06:59


 


Weight 104.1 kg








                                        





                                 04/05/21 09:30

## 2021-04-15 ENCOUNTER — HOSPITAL ENCOUNTER (OUTPATIENT)
Dept: HOSPITAL 47 - CATHEP | Age: 76
LOS: 1 days | Discharge: NURSING, REHAB OR LONG-TERM CARE | End: 2021-04-16
Attending: INTERNAL MEDICINE
Payer: MEDICARE

## 2021-04-15 VITALS — BODY MASS INDEX: 32.8 KG/M2

## 2021-04-15 DIAGNOSIS — Z95.5: ICD-10-CM

## 2021-04-15 DIAGNOSIS — I45.10: ICD-10-CM

## 2021-04-15 DIAGNOSIS — Z95.1: ICD-10-CM

## 2021-04-15 DIAGNOSIS — I25.10: ICD-10-CM

## 2021-04-15 DIAGNOSIS — I25.2: ICD-10-CM

## 2021-04-15 DIAGNOSIS — Z79.01: ICD-10-CM

## 2021-04-15 DIAGNOSIS — R44.2: ICD-10-CM

## 2021-04-15 DIAGNOSIS — E11.9: ICD-10-CM

## 2021-04-15 DIAGNOSIS — G31.83: ICD-10-CM

## 2021-04-15 DIAGNOSIS — R41.3: ICD-10-CM

## 2021-04-15 DIAGNOSIS — Z87.891: ICD-10-CM

## 2021-04-15 DIAGNOSIS — Z79.84: ICD-10-CM

## 2021-04-15 DIAGNOSIS — Z79.899: ICD-10-CM

## 2021-04-15 DIAGNOSIS — Z98.890: ICD-10-CM

## 2021-04-15 DIAGNOSIS — I86.8: ICD-10-CM

## 2021-04-15 DIAGNOSIS — I50.9: ICD-10-CM

## 2021-04-15 DIAGNOSIS — N42.9: ICD-10-CM

## 2021-04-15 DIAGNOSIS — F02.80: ICD-10-CM

## 2021-04-15 DIAGNOSIS — Z79.890: ICD-10-CM

## 2021-04-15 DIAGNOSIS — I25.5: ICD-10-CM

## 2021-04-15 DIAGNOSIS — Z95.0: ICD-10-CM

## 2021-04-15 DIAGNOSIS — G20: ICD-10-CM

## 2021-04-15 DIAGNOSIS — I11.0: ICD-10-CM

## 2021-04-15 DIAGNOSIS — K21.9: ICD-10-CM

## 2021-04-15 DIAGNOSIS — H26.9: ICD-10-CM

## 2021-04-15 DIAGNOSIS — Z45.02: Primary | ICD-10-CM

## 2021-04-15 DIAGNOSIS — Z82.69: ICD-10-CM

## 2021-04-15 DIAGNOSIS — I48.19: ICD-10-CM

## 2021-04-15 DIAGNOSIS — E07.9: ICD-10-CM

## 2021-04-15 DIAGNOSIS — Z98.42: ICD-10-CM

## 2021-04-15 DIAGNOSIS — M10.9: ICD-10-CM

## 2021-04-15 DIAGNOSIS — E78.5: ICD-10-CM

## 2021-04-15 DIAGNOSIS — Z86.2: ICD-10-CM

## 2021-04-15 LAB
BASOPHILS # BLD AUTO: 0.1 K/UL (ref 0–0.2)
BASOPHILS NFR BLD AUTO: 1 %
EOSINOPHIL # BLD AUTO: 0.3 K/UL (ref 0–0.7)
EOSINOPHIL NFR BLD AUTO: 3 %
ERYTHROCYTE [DISTWIDTH] IN BLOOD BY AUTOMATED COUNT: 4.13 M/UL (ref 4.3–5.9)
ERYTHROCYTE [DISTWIDTH] IN BLOOD: 15.3 % (ref 11.5–15.5)
GLUCOSE BLD-MCNC: 106 MG/DL (ref 75–99)
GLUCOSE BLD-MCNC: 125 MG/DL (ref 75–99)
GLUCOSE BLD-MCNC: 85 MG/DL (ref 75–99)
HCT VFR BLD AUTO: 43.8 % (ref 39–53)
HGB BLD-MCNC: 14.2 GM/DL (ref 13–17.5)
INR PPP: 3.2 (ref ?–1.2)
LYMPHOCYTES # SPEC AUTO: 2.9 K/UL (ref 1–4.8)
LYMPHOCYTES NFR SPEC AUTO: 32 %
MCH RBC QN AUTO: 34.4 PG (ref 25–35)
MCHC RBC AUTO-ENTMCNC: 32.4 G/DL (ref 31–37)
MCV RBC AUTO: 105.9 FL (ref 80–100)
MONOCYTES # BLD AUTO: 0.4 K/UL (ref 0–1)
MONOCYTES NFR BLD AUTO: 4 %
NEUTROPHILS # BLD AUTO: 5.2 K/UL (ref 1.3–7.7)
NEUTROPHILS NFR BLD AUTO: 57 %
PLATELET # BLD AUTO: 189 K/UL (ref 150–450)
PT BLD: 30.5 SEC (ref 9–12)
WBC # BLD AUTO: 9 K/UL (ref 3.8–10.6)

## 2021-04-15 PROCEDURE — 92960 CARDIOVERSION ELECTRIC EXT: CPT

## 2021-04-15 PROCEDURE — 97166 OT EVAL MOD COMPLEX 45 MIN: CPT

## 2021-04-15 PROCEDURE — 70450 CT HEAD/BRAIN W/O DYE: CPT

## 2021-04-15 PROCEDURE — 87635 SARS-COV-2 COVID-19 AMP PRB: CPT

## 2021-04-15 PROCEDURE — 97162 PT EVAL MOD COMPLEX 30 MIN: CPT

## 2021-04-15 PROCEDURE — 85025 COMPLETE CBC W/AUTO DIFF WBC: CPT

## 2021-04-15 PROCEDURE — 33264 RMVL & RPLCMT DFB GEN MLT LD: CPT

## 2021-04-15 PROCEDURE — 85610 PROTHROMBIN TIME: CPT

## 2021-04-15 PROCEDURE — 71045 X-RAY EXAM CHEST 1 VIEW: CPT

## 2021-04-15 PROCEDURE — 33225 L VENTRIC PACING LEAD ADD-ON: CPT

## 2021-04-15 RX ADMIN — SODIUM CHLORIDE, PRESERVATIVE FREE SCH ML: 5 INJECTION INTRAVENOUS at 20:08

## 2021-04-15 RX ADMIN — CEFAZOLIN SCH MLS: 330 INJECTION, POWDER, FOR SOLUTION INTRAMUSCULAR; INTRAVENOUS at 06:36

## 2021-04-15 RX ADMIN — CEFAZOLIN SCH: 330 INJECTION, POWDER, FOR SOLUTION INTRAMUSCULAR; INTRAVENOUS at 17:33

## 2021-04-15 NOTE — XR
EXAMINATION TYPE: XR chest 1V portable

 

DATE OF EXAM: 4/15/2021

 

COMPARISON: 11/16/2018

 

INDICATION: Lead placement check

 

TECHNIQUE: Single frontal view of the chest is obtained.

 

FINDINGS:  

The heart size is enlarged.  

The pulmonary vasculature is normal.  

The lungs are clear.  

Assessment overlies left chest

 

IMPRESSION:  

1. No acute pulmonary process.

## 2021-04-15 NOTE — CE
CARDIAC ELECTROPHYSIOLOGY REPORT



This is a 76-year-old male patient with severe ischemic cardiomyopathy, severe heart

failure and a wide QRS about 157 milliseconds, whose dual-chamber ICD is at [facility].  He

has severe cardiomyopathy with chronic systolic dysfunction.  He is brought in for an

upgrade to a biventricular ICD.



Patient was brought to the EP lab in a fasting state. Written informed consent was

obtained prior to the procedure.  The left shoulder area was prepped and draped as per

protocol and 1% lidocaine was used for local anesthesia.  A 4 cm incision was made in

the pectoral area and the device was explanted. Partial capsulectomy was performed.



Axillary vein access was obtained and the sheath was placed in the subclavian vein.



Via this, long sheath was placed in the right side of the heart.



We had difficulty passing the coronary sinus catheter along the innominate vein into

the SVC.  Therefore, a long guidewire was placed, Advantage wire.  Over this, the

coronary sinus sheath was placed in the right atrium and thereafter the coronary sinus

catheter was placed in the right atrium.



The coronary sinus was accessed.  This had a very tortuous anatomy.



Just beyond the ostium of the coronary sinus, there were 2 branches. The upper branch

was a vein of Paxton branch and the low one was main coronary sinus body, but it was

extremely tortuous.  Within a centimeter of this main coronary sinus body, the

posterolateral vein took off. Again extremely tortuous, almost like a pigtail

configuration.  There was a lateral branch which was tortuous and diminutive but there

were no anterior branches to speak of.  It was simply a mesh of tiny veins.  The only

usable vein that he had was a posterolateral branch.



The sub-selecting catheter was used to access the ostium of this branch.  A guidewire

was placed, Advantage wire and passed along the vein.  With sheath manipulation, we

were able to straighten out this vein and passed in it sub-selecting catheter along

with its dilator along the vein and then slide the outer sheath in the coronary sinus,

over this inner sheath into the posterolateral vein.  Thereafter, in a sub-selecting

catheter was removed along with the wire and the lead was placed over the _____ wire.

This also had to be manipulated distally into the vein, but we were able to retain

access quite well.



The screw-in lead was used (Medtronic LV lead model #4798, 88 cm in length and serial

number [id number]. The R-waves were 4.25 mV, pacing impedance 665 ohms, pacing

threshold 1 V at 0.4 milliseconds. Ten volt test was negative.  Excellent threshold was

obtained.  The lead was screwed in and stability was ensured with a push-pull

technique.  The lead is very stable.  The sheath was then removed carefully and an

adequate heel was given and the lead was then secured to the underlying pectoralis

muscle with 2 nonabsorbable sutures after removing the outer sheath.



The chronic right atrial lead was a model #4087, 52 cm in length and serial #[id number],

[employer] lead. The patient was in atrial fibrillation. The patient was

cardioverted and the P waves were between 0.621 mV.  Pacing threshold in the atrium was

high at 2.75 V.



The ICD lead was a [employer] 0185, 64 cm in length and serial #[id number] that had

been positioned in the RV apex. The R-waves were 8.6 mV, pacing impedance 456 ohms,

pacing threshold 0.9 V at 0.4 milliseconds.  The high-voltage impedance was 68 and 71

ohms respectively.



The old generator, [employer] was explanted.  The new generator [employer] was

implanted.  This was a model number [place] heart failure, model #PYTY7F1,  serial

#[id number]. The leads and generator were then placed in subfascial pocket.  The wound

was closed in 3 layers and dressed per protocol.



External cardioversion was performed to convert the patient to sinus rhythm. A 360

joule shock was used to convert the patient to sinus rhythm.



RESULTS:

1. Successful upgrade to a biventricular ICD system with LV lead in the posterolateral

    vein.

2. Electrical cardioversion for atrial fibrillation.



PLAN:

1. Switch to long-acting metoprolol _____ mg p.o. daily.

2. Reduce Imdur to 30 mg p.o. daily.  Continue benazepril, continue spironolactone,

    continue Coumadin and continue statins.





MMODL / IJN: [id number] / Job#: [id number]

## 2021-04-15 NOTE — P.PCN
Preoperative Diagnosis: 


Long procedure


This is a long procedure on account of the main stenosis in the innominate SVC 

junction as well as anatomy of the coronary sinus at its ostium and the 

tortuosity of the coronary sinus as well as the posterior lateral vein requiring

use of multiple in her sheaths and wires placed lead.  This is been described in

the mean procedure

## 2021-04-15 NOTE — CT
EXAMINATION TYPE: CT brain wo con

 

DATE OF EXAM: 4/15/2021

 

HISTORY: Altered mental status post ICD placement and cardioversion.

 

CT DLP: 1255 mGycm.  Automated Exposure Control for Dose Reduction was Utilized.

 

TECHNIQUE: CT scan of the head is performed without contrast.

 

COMPARISON: CT brain October 2, 2020.

 

FINDINGS:   There is no acute intracranial hemorrhage or midline shift identified. There is mild-to-m
oderate diffuse ventricular and sulcal prominence consistent with diffuse age-related cerebral atroph
y.  There is mild low-attenuation in the periventricular white matter consistent with chronic small v
essel ischemic change.  Right occipital partially calcified soft tissue 2.7 cm lesion likely reflects
 benign dermatologic lesion axial image 18 series 6 was partially imaged on prior. Persistent opacifi
ed visualized portion of right maxillary sinus. The globes are intact and the visualized sinuses othe
rwise are clear. Cerumen in the bilateral external auditory canals is redemonstrated. 

 

IMPRESSION:  No acute intracranial hemorrhage or midline shift.  There is mild to moderate diffuse ag
e-related cerebral atrophy and mild chronic small vessel ischemic change redemonstrated.  No signific
ant change from prior CT.

## 2021-04-16 VITALS
TEMPERATURE: 98.3 F | HEART RATE: 81 BPM | SYSTOLIC BLOOD PRESSURE: 112 MMHG | DIASTOLIC BLOOD PRESSURE: 71 MMHG | RESPIRATION RATE: 16 BRPM

## 2021-04-16 LAB
INR PPP: 4.4 (ref ?–1.2)
PT BLD: 42 SEC (ref 9–12)

## 2021-04-16 RX ADMIN — SODIUM CHLORIDE, PRESERVATIVE FREE SCH: 5 INJECTION INTRAVENOUS at 09:26

## 2021-04-16 RX ADMIN — CEFAZOLIN SCH: 330 INJECTION, POWDER, FOR SOLUTION INTRAMUSCULAR; INTRAVENOUS at 06:23

## 2021-04-16 RX ADMIN — CEFAZOLIN SCH: 330 INJECTION, POWDER, FOR SOLUTION INTRAMUSCULAR; INTRAVENOUS at 06:22

## 2021-04-16 NOTE — DS
DISCHARGE SUMMARY



Hola Motley is a 76-year-old male patient who has severe ischemic cardiomyopathy,

wide QRS of greater than 157 milliseconds with severe congestive heart failure, who

underwent upgrade to a biventricular ICD.



He had a very tortuous coronary sinus.  Right at the coronary sinus ostium the vein of

Paxton took of at the ostium itself.  The main coronary body was extremely tortuous

and within a cm or so new  posterolateral branch which was once again very tortuous,

almost like a pigtail spring.  However, we were able to successfully cannulate this

posterolateral vein and place the lead in a very stable position.  This was a screw-in

[employer] lead.  He was upgraded to a biventricular ICD.



Postoperatively, it took a very long time for him to wake up.  Yesterday, when I

evaluated him in the evening.  We planned to discharge him the same day, but he was

barely moving his legs and he was not alert and oriented at all.  Therefore, we kept

him overnight.  He received IV antibiotics.  We did a CT of his head which did not show

any masses or bleeding.  His INR was 3.2.  He had undergone electrical cardioversion

also for atrial fibrillation right after the procedure.



This morning, he is very alert and joking around and is moving all his extremities and

his power seems to be quite reasonable, but he is lying and very difficult to get out

of bed even with help, but his mental status is improved significantly and he is moving

all his 4 extremities, although his speech is still a little slow, but mentally he is

quite sharp.



He remembered his wife's middle name.  He talked about a TV show which he watched and a

character who looked exactly like me.  He thinks it is lunchtime though.



The ICD site has healed well.  There is minimal soakage.  No hematoma.  Heart sounds

are normal and soft.  Lungs are clear.  He looks comfortable.  He has no respiratory

distress.



Blood pressure is 108/74 mmHg, pulse rate is in the 80s and he is afebrile 97.4 degrees

Fahrenheit, respirations are normal.



IMPRESSION:

1. Severe congestive heart failure secondary to severe ischemic cardiomyopathy with a

    wide QRS of greater than 157 milliseconds and persistent atrial fibrillation.

2. Successful upgrade to a biventricular ICD.

3. Successful electrical cardioversion.



PLAN:

1. Reduce Imdur to 30 mg p.o. daily.

2. Change metoprolol tartrate to metoprolol succinate 100 mg twice daily.

3. Continue Coumadin.

4. Continue all other cardiac and heart failure medications.

5. Today his INR is 4.2.  He received a few doses of Kefzol.  We will hold his

    Coumadin.

6. The plan today is to see if we can send him home and we will get physical therapy

    for assistance with ambulation using a walker.  We will get the  and

    the  involved to see if we can get him some home health so we can get

    him home safely today.

7. If he does not perform well towards the afternoon and is unable to get up and stand

    with the help of a walker then we will keep him here until he is safe to do so.





OUMOU / MELANY: [id number] / Job#: [id number]

## 2021-04-18 ENCOUNTER — HOSPITAL ENCOUNTER (EMERGENCY)
Dept: HOSPITAL 47 - EC | Age: 76
Discharge: HOME | End: 2021-04-18
Payer: MEDICARE

## 2021-04-18 VITALS — DIASTOLIC BLOOD PRESSURE: 59 MMHG | SYSTOLIC BLOOD PRESSURE: 93 MMHG

## 2021-04-18 VITALS — TEMPERATURE: 97.9 F | RESPIRATION RATE: 18 BRPM

## 2021-04-18 VITALS — HEART RATE: 80 BPM

## 2021-04-18 DIAGNOSIS — Z79.890: ICD-10-CM

## 2021-04-18 DIAGNOSIS — Z79.899: ICD-10-CM

## 2021-04-18 DIAGNOSIS — Z79.01: ICD-10-CM

## 2021-04-18 DIAGNOSIS — Z95.0: ICD-10-CM

## 2021-04-18 DIAGNOSIS — R41.82: ICD-10-CM

## 2021-04-18 DIAGNOSIS — I11.0: ICD-10-CM

## 2021-04-18 DIAGNOSIS — G20: Primary | ICD-10-CM

## 2021-04-18 DIAGNOSIS — E78.5: ICD-10-CM

## 2021-04-18 DIAGNOSIS — I10: ICD-10-CM

## 2021-04-18 DIAGNOSIS — Z87.891: ICD-10-CM

## 2021-04-18 DIAGNOSIS — Z20.822: ICD-10-CM

## 2021-04-18 DIAGNOSIS — I25.2: ICD-10-CM

## 2021-04-18 DIAGNOSIS — I25.5: ICD-10-CM

## 2021-04-18 DIAGNOSIS — E11.9: ICD-10-CM

## 2021-04-18 DIAGNOSIS — F02.80: ICD-10-CM

## 2021-04-18 DIAGNOSIS — Z95.5: ICD-10-CM

## 2021-04-18 DIAGNOSIS — K21.9: ICD-10-CM

## 2021-04-18 DIAGNOSIS — Z79.84: ICD-10-CM

## 2021-04-18 LAB
ALBUMIN SERPL-MCNC: 3 G/DL (ref 3.5–5)
ALP SERPL-CCNC: 72 U/L (ref 38–126)
ALT SERPL-CCNC: <6 U/L (ref 4–49)
ANION GAP SERPL CALC-SCNC: 6 MMOL/L
APTT BLD: 36.4 SEC (ref 22–30)
AST SERPL-CCNC: 38 U/L (ref 17–59)
BASOPHILS # BLD AUTO: 0 K/UL (ref 0–0.2)
BASOPHILS NFR BLD AUTO: 0 %
BUN SERPL-SCNC: 39 MG/DL (ref 9–20)
CALCIUM SPEC-MCNC: 8.3 MG/DL (ref 8.4–10.2)
CHLORIDE SERPL-SCNC: 101 MMOL/L (ref 98–107)
CO2 SERPL-SCNC: 36 MMOL/L (ref 22–30)
EOSINOPHIL # BLD AUTO: 0.1 K/UL (ref 0–0.7)
EOSINOPHIL NFR BLD AUTO: 2 %
ERYTHROCYTE [DISTWIDTH] IN BLOOD BY AUTOMATED COUNT: 3.81 M/UL (ref 4.3–5.9)
ERYTHROCYTE [DISTWIDTH] IN BLOOD: 15.1 % (ref 11.5–15.5)
GLUCOSE BLD-MCNC: 81 MG/DL (ref 75–99)
GLUCOSE SERPL-MCNC: 89 MG/DL (ref 74–99)
HCT VFR BLD AUTO: 40.5 % (ref 39–53)
HGB BLD-MCNC: 13.2 GM/DL (ref 13–17.5)
INR PPP: 3.8 (ref ?–1.2)
LYMPHOCYTES # SPEC AUTO: 1.4 K/UL (ref 1–4.8)
LYMPHOCYTES NFR SPEC AUTO: 17 %
MCH RBC QN AUTO: 34.7 PG (ref 25–35)
MCHC RBC AUTO-ENTMCNC: 32.7 G/DL (ref 31–37)
MCV RBC AUTO: 106.4 FL (ref 80–100)
MONOCYTES # BLD AUTO: 0.5 K/UL (ref 0–1)
MONOCYTES NFR BLD AUTO: 6 %
NEUTROPHILS # BLD AUTO: 6.2 K/UL (ref 1.3–7.7)
NEUTROPHILS NFR BLD AUTO: 73 %
PH UR: 5 [PH] (ref 5–8)
PLATELET # BLD AUTO: 187 K/UL (ref 150–450)
POTASSIUM SERPL-SCNC: 3.6 MMOL/L (ref 3.5–5.1)
PROT SERPL-MCNC: 5.6 G/DL (ref 6.3–8.2)
PT BLD: 36.1 SEC (ref 9–12)
SODIUM SERPL-SCNC: 143 MMOL/L (ref 137–145)
SP GR UR: 1.02 (ref 1–1.03)
UROBILINOGEN UR QL STRIP: 2 MG/DL (ref ?–2)
WBC # BLD AUTO: 8.5 K/UL (ref 3.8–10.6)

## 2021-04-18 PROCEDURE — 87635 SARS-COV-2 COVID-19 AMP PRB: CPT

## 2021-04-18 PROCEDURE — 71046 X-RAY EXAM CHEST 2 VIEWS: CPT

## 2021-04-18 PROCEDURE — 36415 COLL VENOUS BLD VENIPUNCTURE: CPT

## 2021-04-18 PROCEDURE — 85025 COMPLETE CBC W/AUTO DIFF WBC: CPT

## 2021-04-18 PROCEDURE — 93005 ELECTROCARDIOGRAM TRACING: CPT

## 2021-04-18 PROCEDURE — 85610 PROTHROMBIN TIME: CPT

## 2021-04-18 PROCEDURE — 70450 CT HEAD/BRAIN W/O DYE: CPT

## 2021-04-18 PROCEDURE — 85730 THROMBOPLASTIN TIME PARTIAL: CPT

## 2021-04-18 PROCEDURE — 99285 EMERGENCY DEPT VISIT HI MDM: CPT

## 2021-04-18 PROCEDURE — 80053 COMPREHEN METABOLIC PANEL: CPT

## 2021-04-18 PROCEDURE — 81003 URINALYSIS AUTO W/O SCOPE: CPT

## 2021-04-18 NOTE — XR
EXAMINATION TYPE: XR chest 2V

 

DATE OF EXAM: 4/18/2021

 

COMPARISON: Chest x-ray 3 days ago

 

HISTORY: Weakness and shortness of breath.

 

TECHNIQUE:  Frontal and lateral views of the chest are obtained.

 

FINDINGS:  There is persistent cardiomegaly with multi lead pacemaker/defibrillator. New Elevated hem
idiaphragm with increased gas prominent stomach bubble. Chronic parenchymal changes bilaterally redem
onstrated without new suspicious focal airspace opacity, pleural effusion, or pneumothorax seen. Some
 new mild central vascular congestion is felt present. The osseous structures are intact.

 

IMPRESSION:  Cardiomegaly with suspected new mild central vascular congestion, correlate for CHF exac
erbation.

## 2021-04-18 NOTE — ED
General Adult HPI





- General


Chief complaint: Altered Mental Status


Stated complaint: AMS


Time Seen by Provider: 04/18/21 08:32


Source: patient, family, EMS, RN notes reviewed, old records reviewed


Mode of arrival: EMS


Limitations: no limitations





- History of Present Illness


Initial comments: 





76-year-old male brought in by paramedics from nursing home for an episode of 

unresponsiveness.  Patient has been at the nursing home for 2 days.  He was 

discharged from this facility after a pacemaker replacement.  He was unable to 

ambulate secondary to generalized weakness and was transferred to nursing home 

for rehabilitation.  Patient was poorly found by staff to have decreased level 

of consciousness morning.  He was transported with stable vitals with the 

exception of some hypoxia in the 80s which was easily corrected with nasal 

cannula.  There's been no reported fever.  I did obtain additional history from 

the patient's wife was at bedside indicating that he is now a morning person and

usually does not wake up well until lunchtime.





He had left the hospital unable to ambulate and is essentially at his baseline 

at the time of my evaluation.  Speech is baseline and he has had a decline in 

function recently.





- Related Data


                                Home Medications











 Medication  Instructions  Recorded  Confirmed


 


Atorvastatin [Lipitor] 20 mg PO HS 04/09/18 04/18/21


 


Famotidine [Pepcid] 20 mg PO DAILY 04/09/18 04/18/21


 


Finasteride [Proscar] 5 mg PO HS 04/09/18 04/18/21


 


Furosemide [Lasix] 40 mg PO DAILY 04/09/18 04/18/21


 


Multivitamin [Men's Multi-Vitamin] 1 tab PO DAILY 04/09/18 04/18/21


 


allopurinoL [Zyloprim] 300 mg PO HS 04/09/18 04/18/21


 


glipiZIDE [Glucotrol] 2.5 mg PO BID 04/09/18 04/18/21


 


Warfarin [Coumadin] 5 mg PO DAILY 11/15/18 04/18/21


 


QUEtiapine [SEROquel] 50 mg PO HS 03/31/21 04/18/21


 


rOPINIRole HCL [Requip] 0.25 mg PO HS 04/01/21 04/18/21


 


Benazepril HCl 20 mg PO HS@2000 04/12/21 04/18/21


 


Levothyroxine Sodium 25 mcg PO HS 04/12/21 04/18/21


 


bisacodyL [Dulcolax] 5 mg PO DAILY PRN 04/12/21 04/18/21


 


Metoprolol Succinate (ER) [Toprol 100 mg PO BID@0800,2000 04/18/21 04/18/21





XL]   








                                  Previous Rx's











 Medication  Instructions  Recorded


 


Spironolactone [Aldactone] 25 mg PO DAILY #90 tablet 04/05/21


 


Isosorbide Mononitrate ER [Imdur] 30 mg PO DAILY #90 tab 04/15/21











                                    Allergies











Allergy/AdvReac Type Severity Reaction Status Date / Time


 


No Known Allergies Allergy   Verified 04/18/21 10:01














Review of Systems


ROS Statement: 


Those systems with pertinent positive or pertinent negative responses have been 

documented in the HPI.





ROS Other: All systems not noted in ROS Statement are negative.





Past Medical History


Past Medical History: Diabetes Mellitus, Eye Disorder, GERD/Reflux, 

Hyperlipidemia, Hypertension, Memory Impairment, Myocardial Infarction (MI), 

Prostate Disorder, Thyroid Disorder


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 

WITH HALLUCINATIONS.  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

 and now resolved, SEE DR CHINCHILLA'S HISTORY AND PHYSICAL FOR CARDIAC HISTORY, 

CATARACT RIGHT EYE.  lewy body dementia with parkinsons recently increase upper 

body movement especially at night


Last Myocardial Infarction Date:: 1993


History of Any Multi-Drug Resistant Organisms: None Reported


Past Surgical History: AICD, Heart Catheterization With Stent, Pacemaker


Additional Past Surgical History / Comment(s): SEVERAL CARDIOVERSIONS.  LT 

CATARACT REMOVED


Past Anesthesia/Blood Transfusion Reactions: No Reported Reaction


Date of Last Stent Placement:: 1993


Type of Cardiac Device: Permanent Pacemaker, AICD


Device Placement Date:: 1994, 11/2010


Past Psychological History: Anxiety


Smoking Status: Former smoker


Past Alcohol Use History: Rare


Past Drug Use History: None Reported





- Past Family History


  ** Mother


Additional Family Medical History / Comment(s): SEVERE LEG EDEMA





General Exam


Limitations: no limitations


General appearance: alert, in no apparent distress


Head exam: Present: atraumatic, normocephalic


Eye exam: Present: normal appearance, PERRL


ENT exam: Present: normal exam


Neck exam: Present: normal inspection.  Absent: tenderness, meningismus


Respiratory exam: Present: decreased breath sounds, other (Mild tachypnea, 

pacemaker site is clean dry and intact).  Absent: respiratory distress, wheezes


Cardiovascular Exam: Present: regular rate, normal rhythm


GI/Abdominal exam: Present: soft.  Absent: distended, tenderness, guarding


Extremities exam: Present: normal inspection, normal capillary refill.  Absent: 

pedal edema, calf tenderness


Neurological exam: Present: alert, CN II-XII intact.  Absent: oriented X3 (2), 

motor sensory deficit (No focal findings)


Psychiatric exam: Present: normal affect, normal mood


Skin exam: Present: warm, dry, intact, pallor





Course


                                   Vital Signs











  04/18/21 04/18/21 04/18/21





  08:30 08:36 09:39


 


Temperature 97.9 F  


 


Pulse Rate 78  80


 


Respiratory 18  18





Rate   


 


Blood Pressure 96/66  93/62


 


O2 Sat by Pulse 89 L 98 98





Oximetry   














  04/18/21





  10:04


 


Temperature 


 


Pulse Rate 80


 


Respiratory 18





Rate 


 


Blood Pressure 93/59


 


O2 Sat by Pulse 90 L





Oximetry 














EKG Findings





- EKG Comments:


EKG Findings:: EKG: Paced rhythm, biventricular pacer, rate of 88, QRS duration 

144,  no ST segment elevation.





Medical Decision Making





- Medical Decision Making





68-year-old male with multiple chronic medical conditions including dementia, 

Parkinson's, ischemic cardiomyopathy and CHF presenting with an episode of 

altered mental status.  Wife is at bedside and does state this is baseline for 

the patient especially in the morning.  He is new to the nursing home and staff 

were not familiar with his baseline mental status.  I did perform workup 

including CT brain which is negative for intracranial hemorrhage or mass effect.

  Chest x-ray showing pulmonary vascular congestion.  He has stable labs 

otherwise including CBC, CMP showing some chronic kidney disease which is 

baseline for this patient.  Additionally he has an elevated INR, will hold 

Coumadin.  Urinalysis negative, coronavirus negative.  I did discuss possibility

 of admission versus back to nursing home and at this point patient and spouse 

prefer nursing home for rehabilitation. 





- Lab Data


Result diagrams: 


                                 04/18/21 08:30





                                 04/18/21 08:30


                                   Lab Results











  04/18/21 04/18/21 04/18/21 Range/Units





  08:00 08:30 08:30 


 


WBC   8.5   (3.8-10.6)  k/uL


 


RBC   3.81 L   (4.30-5.90)  m/uL


 


Hgb   13.2   (13.0-17.5)  gm/dL


 


Hct   40.5   (39.0-53.0)  %


 


MCV   106.4 H   (80.0-100.0)  fL


 


MCH   34.7   (25.0-35.0)  pg


 


MCHC   32.7   (31.0-37.0)  g/dL


 


RDW   15.1   (11.5-15.5)  %


 


Plt Count   187   (150-450)  k/uL


 


MPV   7.4   


 


Neutrophils %   73   %


 


Lymphocytes %   17   %


 


Monocytes %   6   %


 


Eosinophils %   2   %


 


Basophils %   0   %


 


Neutrophils #   6.2   (1.3-7.7)  k/uL


 


Lymphocytes #   1.4   (1.0-4.8)  k/uL


 


Monocytes #   0.5   (0-1.0)  k/uL


 


Eosinophils #   0.1   (0-0.7)  k/uL


 


Basophils #   0.0   (0-0.2)  k/uL


 


Hypochromasia   Moderate   


 


Macrocytosis   Moderate   


 


PT    36.1 H  (9.0-12.0)  sec


 


INR    3.8 H  (<1.2)  


 


APTT    36.4 H  (22.0-30.0)  sec


 


Sodium     (137-145)  mmol/L


 


Potassium     (3.5-5.1)  mmol/L


 


Chloride     ()  mmol/L


 


Carbon Dioxide     (22-30)  mmol/L


 


Anion Gap     mmol/L


 


BUN     (9-20)  mg/dL


 


Creatinine     (0.66-1.25)  mg/dL


 


Est GFR (CKD-EPI)AfAm     (>60 ml/min/1.73 sqM)  


 


Est GFR (CKD-EPI)NonAf     (>60 ml/min/1.73 sqM)  


 


Glucose     (74-99)  mg/dL


 


POC Glucose (mg/dL)     (75-99)  mg/dL


 


POC Glu Operater ID     


 


Calcium     (8.4-10.2)  mg/dL


 


Total Bilirubin     (0.2-1.3)  mg/dL


 


AST     (17-59)  U/L


 


ALT     (4-49)  U/L


 


Alkaline Phosphatase     ()  U/L


 


Total Protein     (6.3-8.2)  g/dL


 


Albumin     (3.5-5.0)  g/dL


 


Urine Color  Yellow    


 


Urine Appearance  Clear    (Clear)  


 


Urine pH  5.0    (5.0-8.0)  


 


Ur Specific Gravity  1.019    (1.001-1.035)  


 


Urine Protein  Trace H    (Negative)  


 


Urine Glucose (UA)  Negative    (Negative)  


 


Urine Ketones  Negative    (Negative)  


 


Urine Blood  Negative    (Negative)  


 


Urine Nitrite  Negative    (Negative)  


 


Urine Bilirubin  Negative    (Negative)  


 


Urine Urobilinogen  2.0    (<2.0)  mg/dL


 


Ur Leukocyte Esterase  Negative    (Negative)  


 


Coronavirus (PCR)     (Not Detectd)  














  04/18/21 04/18/21 04/18/21 Range/Units





  08:30 08:32 08:47 


 


WBC     (3.8-10.6)  k/uL


 


RBC     (4.30-5.90)  m/uL


 


Hgb     (13.0-17.5)  gm/dL


 


Hct     (39.0-53.0)  %


 


MCV     (80.0-100.0)  fL


 


MCH     (25.0-35.0)  pg


 


MCHC     (31.0-37.0)  g/dL


 


RDW     (11.5-15.5)  %


 


Plt Count     (150-450)  k/uL


 


MPV     


 


Neutrophils %     %


 


Lymphocytes %     %


 


Monocytes %     %


 


Eosinophils %     %


 


Basophils %     %


 


Neutrophils #     (1.3-7.7)  k/uL


 


Lymphocytes #     (1.0-4.8)  k/uL


 


Monocytes #     (0-1.0)  k/uL


 


Eosinophils #     (0-0.7)  k/uL


 


Basophils #     (0-0.2)  k/uL


 


Hypochromasia     


 


Macrocytosis     


 


PT     (9.0-12.0)  sec


 


INR     (<1.2)  


 


APTT     (22.0-30.0)  sec


 


Sodium  143    (137-145)  mmol/L


 


Potassium  3.6    (3.5-5.1)  mmol/L


 


Chloride  101    ()  mmol/L


 


Carbon Dioxide  36 H    (22-30)  mmol/L


 


Anion Gap  6    mmol/L


 


BUN  39 H    (9-20)  mg/dL


 


Creatinine  1.44 H    (0.66-1.25)  mg/dL


 


Est GFR (CKD-EPI)AfAm  53    (>60 ml/min/1.73 sqM)  


 


Est GFR (CKD-EPI)NonAf  46    (>60 ml/min/1.73 sqM)  


 


Glucose  89    (74-99)  mg/dL


 


POC Glucose (mg/dL)   81   (75-99)  mg/dL


 


POC Glu Operater ID   Issa Stearns   


 


Calcium  8.3 L    (8.4-10.2)  mg/dL


 


Total Bilirubin  1.8 H    (0.2-1.3)  mg/dL


 


AST  38    (17-59)  U/L


 


ALT  <6    (4-49)  U/L


 


Alkaline Phosphatase  72    ()  U/L


 


Total Protein  5.6 L    (6.3-8.2)  g/dL


 


Albumin  3.0 L    (3.5-5.0)  g/dL


 


Urine Color     


 


Urine Appearance     (Clear)  


 


Urine pH     (5.0-8.0)  


 


Ur Specific Gravity     (1.001-1.035)  


 


Urine Protein     (Negative)  


 


Urine Glucose (UA)     (Negative)  


 


Urine Ketones     (Negative)  


 


Urine Blood     (Negative)  


 


Urine Nitrite     (Negative)  


 


Urine Bilirubin     (Negative)  


 


Urine Urobilinogen     (<2.0)  mg/dL


 


Ur Leukocyte Esterase     (Negative)  


 


Coronavirus (PCR)    Not Detected  (Not Detectd)  














Disposition


Clinical Impression: 


 Dementia, Altered mental status





Disposition: HOME SELF-CARE


Condition: Fair


Instructions (If sedation given, give patient instructions):  Altered Mental 

Status (ED)


Is patient prescribed a controlled substance at d/c from ED?: No


Referrals: 


Simone Villalta MD [Primary Care Provider] - 1-2 days


Time of Disposition: 10:20

## 2021-04-18 NOTE — CT
EXAMINATION TYPE: CT brain wo con

 

DATE OF EXAM: 4/18/2021

 

HISTORY: Altered mental status

 

CT DLP: 1087.4 mGycm.  Automated Exposure Control for Dose Reduction was Utilized.

 

TECHNIQUE: CT scan of the head is performed without contrast.

 

COMPARISON: CT brain 3 days ago.

 

FINDINGS:   There is no acute intracranial hemorrhage or midline shift identified. There is mild diff
use ventricular and sulcal prominence consistent with diffuse age-related cerebral atrophy. Gray-whit
e matter differentiation is maintained. Patchy soft tissue opacity bilateral external auditory canals
 is felt to reflect cerumen. No significant change from prior. The globes are intact and the visualiz
ed sinuses are clear.   Persistent calcified right occipital subcutaneous 2.7 cm lesion likely dermat
ologic in origin is partially imaged.

 

IMPRESSION:  No acute intracranial hemorrhage or midline shift.  There is mild diffuse cerebral atrop
hy and chronic small vessel ischemic change redemonstrated.  No significant change from prior CT.

## 2021-04-22 ENCOUNTER — HOSPITAL ENCOUNTER (INPATIENT)
Dept: HOSPITAL 47 - EC | Age: 76
LOS: 4 days | Discharge: HOME | DRG: 71 | End: 2021-04-26
Attending: FAMILY MEDICINE | Admitting: FAMILY MEDICINE
Payer: MEDICARE

## 2021-04-22 VITALS — BODY MASS INDEX: 30.4 KG/M2

## 2021-04-22 DIAGNOSIS — F41.9: ICD-10-CM

## 2021-04-22 DIAGNOSIS — I95.9: ICD-10-CM

## 2021-04-22 DIAGNOSIS — E03.9: ICD-10-CM

## 2021-04-22 DIAGNOSIS — K59.00: ICD-10-CM

## 2021-04-22 DIAGNOSIS — Z87.891: ICD-10-CM

## 2021-04-22 DIAGNOSIS — I50.9: ICD-10-CM

## 2021-04-22 DIAGNOSIS — Z79.01: ICD-10-CM

## 2021-04-22 DIAGNOSIS — H26.9: ICD-10-CM

## 2021-04-22 DIAGNOSIS — N17.9: ICD-10-CM

## 2021-04-22 DIAGNOSIS — R77.8: ICD-10-CM

## 2021-04-22 DIAGNOSIS — K21.9: ICD-10-CM

## 2021-04-22 DIAGNOSIS — G93.40: Primary | ICD-10-CM

## 2021-04-22 DIAGNOSIS — G25.81: ICD-10-CM

## 2021-04-22 DIAGNOSIS — I48.19: ICD-10-CM

## 2021-04-22 DIAGNOSIS — Z98.42: ICD-10-CM

## 2021-04-22 DIAGNOSIS — F02.80: ICD-10-CM

## 2021-04-22 DIAGNOSIS — E78.5: ICD-10-CM

## 2021-04-22 DIAGNOSIS — I67.89: ICD-10-CM

## 2021-04-22 DIAGNOSIS — I25.2: ICD-10-CM

## 2021-04-22 DIAGNOSIS — L89.150: ICD-10-CM

## 2021-04-22 DIAGNOSIS — Z79.899: ICD-10-CM

## 2021-04-22 DIAGNOSIS — Z86.69: ICD-10-CM

## 2021-04-22 DIAGNOSIS — I25.10: ICD-10-CM

## 2021-04-22 DIAGNOSIS — I11.0: ICD-10-CM

## 2021-04-22 DIAGNOSIS — I25.5: ICD-10-CM

## 2021-04-22 DIAGNOSIS — E11.51: ICD-10-CM

## 2021-04-22 DIAGNOSIS — Z95.810: ICD-10-CM

## 2021-04-22 DIAGNOSIS — G31.83: ICD-10-CM

## 2021-04-22 DIAGNOSIS — I10: ICD-10-CM

## 2021-04-22 DIAGNOSIS — Z79.84: ICD-10-CM

## 2021-04-22 DIAGNOSIS — R09.02: ICD-10-CM

## 2021-04-22 LAB
ALBUMIN SERPL-MCNC: 2.6 G/DL (ref 3.5–5)
ALP SERPL-CCNC: 66 U/L (ref 38–126)
ALT SERPL-CCNC: 6 U/L (ref 4–49)
ANION GAP SERPL CALC-SCNC: 4 MMOL/L
APTT BLD: 28.9 SEC (ref 22–30)
AST SERPL-CCNC: 27 U/L (ref 17–59)
BASOPHILS # BLD AUTO: 0 K/UL (ref 0–0.2)
BASOPHILS NFR BLD AUTO: 0 %
BUN SERPL-SCNC: 79 MG/DL (ref 9–20)
CALCIUM SPEC-MCNC: 8 MG/DL (ref 8.4–10.2)
CHLORIDE SERPL-SCNC: 103 MMOL/L (ref 98–107)
CK SERPL-CCNC: 54 U/L (ref 55–170)
CO2 SERPL-SCNC: 33 MMOL/L (ref 22–30)
EOSINOPHIL # BLD AUTO: 0.1 K/UL (ref 0–0.7)
EOSINOPHIL NFR BLD AUTO: 1 %
ERYTHROCYTE [DISTWIDTH] IN BLOOD BY AUTOMATED COUNT: 3.75 M/UL (ref 4.3–5.9)
ERYTHROCYTE [DISTWIDTH] IN BLOOD: 15.8 % (ref 11.5–15.5)
GLUCOSE BLD-MCNC: 98 MG/DL (ref 75–99)
GLUCOSE SERPL-MCNC: 94 MG/DL (ref 74–99)
HCT VFR BLD AUTO: 39.7 % (ref 39–53)
HGB BLD-MCNC: 12.5 GM/DL (ref 13–17.5)
INR PPP: 1.9 (ref ?–1.2)
LYMPHOCYTES # SPEC AUTO: 1.2 K/UL (ref 1–4.8)
LYMPHOCYTES NFR SPEC AUTO: 14 %
MCH RBC QN AUTO: 33.4 PG (ref 25–35)
MCHC RBC AUTO-ENTMCNC: 31.6 G/DL (ref 31–37)
MCV RBC AUTO: 105.8 FL (ref 80–100)
MONOCYTES # BLD AUTO: 0.5 K/UL (ref 0–1)
MONOCYTES NFR BLD AUTO: 6 %
NEUTROPHILS # BLD AUTO: 6.5 K/UL (ref 1.3–7.7)
NEUTROPHILS NFR BLD AUTO: 76 %
PH UR: 5 [PH] (ref 5–8)
PLATELET # BLD AUTO: 184 K/UL (ref 150–450)
POTASSIUM SERPL-SCNC: 3.8 MMOL/L (ref 3.5–5.1)
PROT SERPL-MCNC: 5 G/DL (ref 6.3–8.2)
PT BLD: 18.9 SEC (ref 9–12)
SODIUM SERPL-SCNC: 140 MMOL/L (ref 137–145)
SP GR UR: 1.02 (ref 1–1.03)
UROBILINOGEN UR QL STRIP: 3 MG/DL (ref ?–2)
WBC # BLD AUTO: 8.5 K/UL (ref 3.8–10.6)

## 2021-04-22 PROCEDURE — 80053 COMPREHEN METABOLIC PANEL: CPT

## 2021-04-22 PROCEDURE — 85025 COMPLETE CBC W/AUTO DIFF WBC: CPT

## 2021-04-22 PROCEDURE — 81003 URINALYSIS AUTO W/O SCOPE: CPT

## 2021-04-22 PROCEDURE — 80048 BASIC METABOLIC PNL TOTAL CA: CPT

## 2021-04-22 PROCEDURE — 36415 COLL VENOUS BLD VENIPUNCTURE: CPT

## 2021-04-22 PROCEDURE — 85610 PROTHROMBIN TIME: CPT

## 2021-04-22 PROCEDURE — 85730 THROMBOPLASTIN TIME PARTIAL: CPT

## 2021-04-22 PROCEDURE — 83036 HEMOGLOBIN GLYCOSYLATED A1C: CPT

## 2021-04-22 PROCEDURE — 87635 SARS-COV-2 COVID-19 AMP PRB: CPT

## 2021-04-22 PROCEDURE — 70450 CT HEAD/BRAIN W/O DYE: CPT

## 2021-04-22 PROCEDURE — 71045 X-RAY EXAM CHEST 1 VIEW: CPT

## 2021-04-22 PROCEDURE — 93306 TTE W/DOPPLER COMPLETE: CPT

## 2021-04-22 PROCEDURE — 85027 COMPLETE CBC AUTOMATED: CPT

## 2021-04-22 PROCEDURE — 95819 EEG AWAKE AND ASLEEP: CPT

## 2021-04-22 PROCEDURE — 84484 ASSAY OF TROPONIN QUANT: CPT

## 2021-04-22 PROCEDURE — 99285 EMERGENCY DEPT VISIT HI MDM: CPT

## 2021-04-22 PROCEDURE — 82550 ASSAY OF CK (CPK): CPT

## 2021-04-22 PROCEDURE — 82140 ASSAY OF AMMONIA: CPT

## 2021-04-22 PROCEDURE — 93005 ELECTROCARDIOGRAM TRACING: CPT

## 2021-04-22 PROCEDURE — 93880 EXTRACRANIAL BILAT STUDY: CPT

## 2021-04-22 NOTE — ED
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 Kinase     ()  U/L


 


Troponin I     (0.000-0.034)  ng/mL


 


Total Protein     (6.3-8.2)  g/dL


 


Albumin     (3.5-5.0)  g/dL


 


Urine Color     


 


Urine Appearance     (Clear)  


 


Urine pH     (5.0-8.0)  


 


Ur Specific Gravity     (1.001-1.035)  


 


Urine Protein     (Negative)  


 


Urine Glucose (UA)     (Negative)  


 


Urine Ketones     (Negative)  


 


Urine Blood     (Negative)  


 


Urine Nitrite     (Negative)  


 


Urine Bilirubin     (Negative)  


 


Urine Urobilinogen     (<2.0)  mg/dL


 


Ur Leukocyte Esterase     (Negative)  


 


Coronavirus (PCR)     (Not Detectd)  














  04/24/21 04/24/21 04/24/21 Range/Units





  08:32 08:32 08:32 


 


WBC  7.4    (3.8-10.6)  k/uL


 


RBC  3.79 L    (4.30-5.90)  m/uL


 


Hgb  12.6 L    (13.0-17.5)  gm/dL


 


Hct  40.8    (39.0-53.0)  %


 


MCV  107.7 H    (80.0-100.0)  fL


 


MCH  33.1    (25.0-35.0)  pg


 


MCHC  30.8 L    (31.0-37.0)  g/dL


 


RDW  15.4    (11.5-15.5)  %


 


Plt Count  176    (150-450)  k/uL


 


MPV  7.6    


 


Neutrophils %     %


 


Neutrophils % (Manual)     %


 


Band Neuts % (Manual)     %


 


Lymphocytes %     %


 


Lymphocytes % (Manual)     %


 


Monocytes %     %


 


Monocytes % (Manual)     %


 


Eosinophils %     %


 


Eosinophils % (Manual)     %


 


Basophils %     %


 


Basophils % (Manual)     %


 


Neutrophils #     (1.3-7.7)  k/uL


 


Neutrophils # (Manual)     (1.3-7.7)  k/uL


 


Lymphocytes #     (1.0-4.8)  k/uL


 


Lymphocytes # (Manual)     (1.0-4.8)  k/uL


 


Monocytes #     (0-1.0)  k/uL


 


Monocytes # (Manual)     (0-1.0)  k/uL


 


Eosinophils #     (0-0.7)  k/uL


 


Eosinophils # (Manual)     (0-0.7)  k/uL


 


Basophils #     (0-0.2)  k/uL


 


Basophils # (Manual)     (0-0.2)  k/uL


 


Nucleated RBCs     (0-0)  /100 WBC


 


Manual Slide Review     


 


Hypochromasia  Moderate    


 


Poikilocytosis (manual     


 


Macrocytosis  Marked A    


 


PT     (9.0-12.0)  sec


 


INR     (<1.2)  


 


APTT     (22.0-30.0)  sec


 


Sodium   144   (137-145)  mmol/L


 


Potassium   3.6   (3.5-5.1)  mmol/L


 


Chloride   107   ()  mmol/L


 


Carbon Dioxide   32 H   (22-30)  mmol/L


 


Anion Gap   5   mmol/L


 


BUN   67 H   (9-20)  mg/dL


 


Creatinine   1.30 H   (0.66-1.25)  mg/dL


 


Est GFR (CKD-EPI)AfAm   61   (>60 ml/min/1.73 sqM)  


 


Est GFR (CKD-EPI)NonAf   52   (>60 ml/min/1.73 sqM)  


 


Glucose   72 L   (74-99)  mg/dL


 


POC Glucose (mg/dL)     (75-99)  mg/dL


 


POC Glu Operater ID     


 


Estimated Ave Glu mg/dL    111  


 


Hemoglobin A1c    5.5  (4.0-6.0)  %


 


Calcium   8.2 L   (8.4-10.2)  mg/dL


 


Total Bilirubin     (0.2-1.3)  mg/dL


 


AST     (17-59)  U/L


 


ALT     (4-49)  U/L


 


Alkaline Phosphatase     ()  U/L


 


Ammonia     (<30)  umol/L


 


Creatine Kinase     ()  U/L


 


Troponin I     (0.000-0.034)  ng/mL


 


Total Protein     (6.3-8.2)  g/dL


 


Albumin     (3.5-5.0)  g/dL


 


Urine Color     


 


Urine Appearance     (Clear)  


 


Urine pH     (5.0-8.0)  


 


Ur Specific Gravity     (1.001-1.035)  


 


Urine Protein     (Negative)  


 


Urine Glucose (UA)     (Negative)  


 


Urine Ketones     (Negative)  


 


Urine Blood     (Negative)  


 


Urine Nitrite     (Negative)  


 


Urine Bilirubin     (Negative)  


 


Urine Urobilinogen     (<2.0)  mg/dL


 


Ur Leukocyte Esterase     (Negative)  


 


Coronavirus (PCR)     (Not Detectd)  














  04/24/21 04/24/21 04/24/21 Range/Units





  11:38 12:04 16:40 


 


WBC     (3.8-10.6)  k/uL


 


RBC     (4.30-5.90)  m/uL


 


Hgb     (13.0-17.5)  gm/dL


 


Hct     (39.0-53.0)  %


 


MCV     (80.0-100.0)  fL


 


MCH     (25.0-35.0)  pg


 


MCHC     (31.0-37.0)  g/dL


 


RDW     (11.5-15.5)  %


 


Plt Count     (150-450)  k/uL


 


MPV     


 


Neutrophils %     %


 


Neutrophils % (Manual)     %


 


Band Neuts % (Manual)     %


 


Lymphocytes %     %


 


Lymphocytes % (Manual)     %


 


Monocytes %     %


 


Monocytes % (Manual)     %


 


Eosinophils %     %


 


Eosinophils % (Manual)     %


 


Basophils %     %


 


Basophils % (Manual)     %


 


Neutrophils #     (1.3-7.7)  k/uL


 


Neutrophils # (Manual)     (1.3-7.7)  k/uL


 


Lymphocytes #     (1.0-4.8)  k/uL


 


Lymphocytes # (Manual)     (1.0-4.8)  k/uL


 


Monocytes #     (0-1.0)  k/uL


 


Monocytes # (Manual)     (0-1.0)  k/uL


 


Eosinophils #     (0-0.7)  k/uL


 


Eosinophils # (Manual)     (0-0.7)  k/uL


 


Basophils #     (0-0.2)  k/uL


 


Basophils # (Manual)     (0-0.2)  k/uL


 


Nucleated RBCs     (0-0)  /100 WBC


 


Manual Slide Review     


 


Hypochromasia     


 


Poikilocytosis (manual     


 


Macrocytosis     


 


PT     (9.0-12.0)  sec


 


INR     (<1.2)  


 


APTT     (22.0-30.0)  sec


 


Sodium     (137-145)  mmol/L


 


Potassium     (3.5-5.1)  mmol/L


 


Chloride     ()  mmol/L


 


Carbon Dioxide     (22-30)  mmol/L


 


Anion Gap     mmol/L


 


BUN     (9-20)  mg/dL


 


Creatinine     (0.66-1.25)  mg/dL


 


Est GFR (CKD-EPI)AfAm     (>60 ml/min/1.73 sqM)  


 


Est GFR (CKD-EPI)NonAf     (>60 ml/min/1.73 sqM)  


 


Glucose     (74-99)  mg/dL


 


POC Glucose (mg/dL)  63 L  82  114 H  (75-99)  mg/dL


 


POC Glu Operater Tori Obrien, Tori Gonzalez  


 


Estimated Ave Glu mg/dL     


 


Hemoglobin A1c     (4.0-6.0)  %


 


Calcium     (8.4-10.2)  mg/dL


 


Total Bilirubin     (0.2-1.3)  mg/dL


 


AST     (17-59)  U/L


 


ALT     (4-49)  U/L


 


Alkaline Phosphatase     ()  U/L


 


Ammonia     (<30)  umol/L


 


Creatine Kinase     ()  U/L


 


Troponin I     (0.000-0.034)  ng/mL


 


Total Protein     (6.3-8.2)  g/dL


 


Albumin     (3.5-5.0)  g/dL


 


Urine Color     


 


Urine Appearance     (Clear)  


 


Urine pH     (5.0-8.0)  


 


Ur Specific Gravity     (1.001-1.035)  


 


Urine Protein     (Negative)  


 


Urine Glucose (UA)     (Negative)  


 


Urine Ketones     (Negative)  


 


Urine Blood     (Negative)  


 


Urine Nitrite     (Negative)  


 


Urine Bilirubin     (Negative)  


 


Urine Urobilinogen     (<2.0)  mg/dL


 


Ur Leukocyte Esterase     (Negative)  


 


Coronavirus (PCR)     (Not Detectd)  














  04/24/21 04/25/21 04/25/21 Range/Units





  19:44 06:00 08:36 


 


WBC     (3.8-10.6)  k/uL


 


RBC     (4.30-5.90)  m/uL


 


Hgb     (13.0-17.5)  gm/dL


 


Hct     (39.0-53.0)  %


 


MCV     (80.0-100.0)  fL


 


MCH     (25.0-35.0)  pg


 


MCHC     (31.0-37.0)  g/dL


 


RDW     (11.5-15.5)  %


 


Plt Count     (150-450)  k/uL


 


MPV     


 


Neutrophils %     %


 


Neutrophils % (Manual)     %


 


Band Neuts % (Manual)     %


 


Lymphocytes %     %


 


Lymphocytes % (Manual)     %


 


Monocytes %     %


 


Monocytes % (Manual)     %


 


Eosinophils %     %


 


Eosinophils % (Manual)     %


 


Basophils %     %


 


Basophils % (Manual)     %


 


Neutrophils #     (1.3-7.7)  k/uL


 


Neutrophils # (Manual)     (1.3-7.7)  k/uL


 


Lymphocytes #     (1.0-4.8)  k/uL


 


Lymphocytes # (Manual)     (1.0-4.8)  k/uL


 


Monocytes #     (0-1.0)  k/uL


 


Monocytes # (Manual)     (0-1.0)  k/uL


 


Eosinophils #     (0-0.7)  k/uL


 


Eosinophils # (Manual)     (0-0.7)  k/uL


 


Basophils #     (0-0.2)  k/uL


 


Basophils # (Manual)     (0-0.2)  k/uL


 


Nucleated RBCs     (0-0)  /100 WBC


 


Manual Slide Review     


 


Hypochromasia     


 


Poikilocytosis (manual     


 


Macrocytosis     


 


PT    22.4 H  (9.0-12.0)  sec


 


INR    2.3 H  (<1.2)  


 


APTT     (22.0-30.0)  sec


 


Sodium     (137-145)  mmol/L


 


Potassium     (3.5-5.1)  mmol/L


 


Chloride     ()  mmol/L


 


Carbon Dioxide     (22-30)  mmol/L


 


Anion Gap     mmol/L


 


BUN     (9-20)  mg/dL


 


Creatinine     (0.66-1.25)  mg/dL


 


Est GFR (CKD-EPI)AfAm     (>60 ml/min/1.73 sqM)  


 


Est GFR (CKD-EPI)NonAf     (>60 ml/min/1.73 sqM)  


 


Glucose     (74-99)  mg/dL


 


POC Glucose (mg/dL)  110 H  98   (75-99)  mg/dL


 


POC Glu Operater Lavell Mccracken Jeremy   


 


Estimated Ave Glu mg/dL     


 


Hemoglobin A1c     (4.0-6.0)  %


 


Calcium     (8.4-10.2)  mg/dL


 


Total Bilirubin     (0.2-1.3)  mg/dL


 


AST     (17-59)  U/L


 


ALT     (4-49)  U/L


 


Alkaline Phosphatase     ()  U/L


 


Ammonia     (<30)  umol/L


 


Creatine Kinase     ()  U/L


 


Troponin I     (0.000-0.034)  ng/mL


 


Total Protein     (6.3-8.2)  g/dL


 


Albumin     (3.5-5.0)  g/dL


 


Urine Color     


 


Urine Appearance     (Clear)  


 


Urine pH     (5.0-8.0)  


 


Ur Specific Gravity     (1.001-1.035)  


 


Urine Protein     (Negative)  


 


Urine Glucose (UA)     (Negative)  


 


Urine Ketones     (Negative)  


 


Urine Blood     (Negative)  


 


Urine Nitrite     (Negative)  


 


Urine Bilirubin     (Negative)  


 


Urine Urobilinogen     (<2.0)  mg/dL


 


Ur Leukocyte Esterase     (Negative)  


 


Coronavirus (PCR)     (Not Detectd)  














  04/25/21 04/25/21 04/25/21 Range/Units





  08:36 08:36 11:45 


 


WBC  6.9    (3.8-10.6)  k/uL


 


RBC  3.58 L    (4.30-5.90)  m/uL


 


Hgb  12.3 L    (13.0-17.5)  gm/dL


 


Hct  38.1 L    (39.0-53.0)  %


 


MCV  106.6 H    (80.0-100.0)  fL


 


MCH  34.3    (25.0-35.0)  pg


 


MCHC  32.2    (31.0-37.0)  g/dL


 


RDW  15.2    (11.5-15.5)  %


 


Plt Count  197    (150-450)  k/uL


 


MPV  7.0    


 


Neutrophils %  60    %


 


Neutrophils % (Manual)     %


 


Band Neuts % (Manual)     %


 


Lymphocytes %  23    %


 


Lymphocytes % (Manual)     %


 


Monocytes %  10    %


 


Monocytes % (Manual)     %


 


Eosinophils %  3    %


 


Eosinophils % (Manual)     %


 


Basophils %  0    %


 


Basophils % (Manual)     %


 


Neutrophils #  4.2    (1.3-7.7)  k/uL


 


Neutrophils # (Manual)     (1.3-7.7)  k/uL


 


Lymphocytes #  1.6    (1.0-4.8)  k/uL


 


Lymphocytes # (Manual)     (1.0-4.8)  k/uL


 


Monocytes #  0.7    (0-1.0)  k/uL


 


Monocytes # (Manual)     (0-1.0)  k/uL


 


Eosinophils #  0.2    (0-0.7)  k/uL


 


Eosinophils # (Manual)     (0-0.7)  k/uL


 


Basophils #  0.0    (0-0.2)  k/uL


 


Basophils # (Manual)     (0-0.2)  k/uL


 


Nucleated RBCs     (0-0)  /100 WBC


 


Manual Slide Review  Performed    


 


Hypochromasia  Slight    


 


Poikilocytosis (manual  Present    


 


Macrocytosis  Moderate    


 


PT     (9.0-12.0)  sec


 


INR     (<1.2)  


 


APTT     (22.0-30.0)  sec


 


Sodium   144   (137-145)  mmol/L


 


Potassium   3.4 L   (3.5-5.1)  mmol/L


 


Chloride   109 H   ()  mmol/L


 


Carbon Dioxide   33 H   (22-30)  mmol/L


 


Anion Gap   2   mmol/L


 


BUN   55 H   (9-20)  mg/dL


 


Creatinine   1.08   (0.66-1.25)  mg/dL


 


Est GFR (CKD-EPI)AfAm   76   (>60 ml/min/1.73 sqM)  


 


Est GFR (CKD-EPI)NonAf   65   (>60 ml/min/1.73 sqM)  


 


Glucose   104 H   (74-99)  mg/dL


 


POC Glucose (mg/dL)    86  (75-99)  mg/dL


 


POC Glu Operater ID    Tori Martino  


 


Estimated Ave Glu mg/dL     


 


Hemoglobin A1c     (4.0-6.0)  %


 


Calcium   8.3 L   (8.4-10.2)  mg/dL


 


Total Bilirubin   1.1   (0.2-1.3)  mg/dL


 


AST   22   (17-59)  U/L


 


ALT   6   (4-49)  U/L


 


Alkaline Phosphatase   73   ()  U/L


 


Ammonia     (<30)  umol/L


 


Creatine Kinase     ()  U/L


 


Troponin I     (0.000-0.034)  ng/mL


 


Total Protein   4.9 L   (6.3-8.2)  g/dL


 


Albumin   2.5 L   (3.5-5.0)  g/dL


 


Urine Color     


 


Urine Appearance     (Clear)  


 


Urine pH     (5.0-8.0)  


 


Ur Specific Gravity     (1.001-1.035)  


 


Urine Protein     (Negative)  


 


Urine Glucose (UA)     (Negative)  


 


Urine Ketones     (Negative)  


 


Urine Blood     (Negative)  


 


Urine Nitrite     (Negative)  


 


Urine Bilirubin     (Negative)  


 


Urine Urobilinogen     (<2.0)  mg/dL


 


Ur Leukocyte Esterase     (Negative)  


 


Coronavirus (PCR)     (Not Detectd)  














  04/25/21 04/25/21 04/26/21 Range/Units





  16:29 19:53 06:21 


 


WBC     (3.8-10.6)  k/uL


 


RBC     (4.30-5.90)  m/uL


 


Hgb     (13.0-17.5)  gm/dL


 


Hct     (39.0-53.0)  %


 


MCV     (80.0-100.0)  fL


 


MCH     (25.0-35.0)  pg


 


MCHC     (31.0-37.0)  g/dL


 


RDW     (11.5-15.5)  %


 


Plt Count     (150-450)  k/uL


 


MPV     


 


Neutrophils %     %


 


Neutrophils % (Manual)     %


 


Band Neuts % (Manual)     %


 


Lymphocytes %     %


 


Lymphocytes % (Manual)     %


 


Monocytes %     %


 


Monocytes % (Manual)     %


 


Eosinophils %     %


 


Eosinophils % (Manual)     %


 


Basophils %     %


 


Basophils % (Manual)     %


 


Neutrophils #     (1.3-7.7)  k/uL


 


Neutrophils # (Manual)     (1.3-7.7)  k/uL


 


Lymphocytes #     (1.0-4.8)  k/uL


 


Lymphocytes # (Manual)     (1.0-4.8)  k/uL


 


Monocytes #     (0-1.0)  k/uL


 


Monocytes # (Manual)     (0-1.0)  k/uL


 


Eosinophils #     (0-0.7)  k/uL


 


Eosinophils # (Manual)     (0-0.7)  k/uL


 


Basophils #     (0-0.2)  k/uL


 


Basophils # (Manual)     (0-0.2)  k/uL


 


Nucleated RBCs     (0-0)  /100 WBC


 


Manual Slide Review     


 


Hypochromasia     


 


Poikilocytosis (manual     


 


Macrocytosis     


 


PT     (9.0-12.0)  sec


 


INR     (<1.2)  


 


APTT     (22.0-30.0)  sec


 


Sodium     (137-145)  mmol/L


 


Potassium     (3.5-5.1)  mmol/L


 


Chloride     ()  mmol/L


 


Carbon Dioxide     (22-30)  mmol/L


 


Anion Gap     mmol/L


 


BUN     (9-20)  mg/dL


 


Creatinine     (0.66-1.25)  mg/dL


 


Est GFR (CKD-EPI)AfAm     (>60 ml/min/1.73 sqM)  


 


Est GFR (CKD-EPI)NonAf     (>60 ml/min/1.73 sqM)  


 


Glucose     (74-99)  mg/dL


 


POC Glucose (mg/dL)  111 H  136 H  79  (75-99)  mg/dL


 


POC Glu Operater Tori Obrien Jeremy Witmer, Lavell  


 


Estimated Ave Glu mg/dL     


 


Hemoglobin A1c     (4.0-6.0)  %


 


Calcium     (8.4-10.2)  mg/dL


 


Total Bilirubin     (0.2-1.3)  mg/dL


 


AST     (17-59)  U/L


 


ALT     (4-49)  U/L


 


Alkaline Phosphatase     ()  U/L


 


Ammonia     (<30)  umol/L


 


Creatine Kinase     ()  U/L


 


Troponin I     (0.000-0.034)  ng/mL


 


Total Protein     (6.3-8.2)  g/dL


 


Albumin     (3.5-5.0)  g/dL


 


Urine Color     


 


Urine Appearance     (Clear)  


 


Urine pH     (5.0-8.0)  


 


Ur Specific Gravity     (1.001-1.035)  


 


Urine Protein     (Negative)  


 


Urine Glucose (UA)     (Negative)  


 


Urine Ketones     (Negative)  


 


Urine Blood     (Negative)  


 


Urine Nitrite     (Negative)  


 


Urine Bilirubin     (Negative)  


 


Urine Urobilinogen     (<2.0)  mg/dL


 


Ur Leukocyte Esterase     (Negative)  


 


Coronavirus (PCR)     (Not Detectd)  














  04/26/21 04/26/21 04/26/21 Range/Units





  08:55 08:55 08:55 


 


WBC   7.2   (3.8-10.6)  k/uL


 


RBC   3.70 L   (4.30-5.90)  m/uL


 


Hgb   12.5 L   (13.0-17.5)  gm/dL


 


Hct   39.8   (39.0-53.0)  %


 


MCV   107.7 H   (80.0-100.0)  fL


 


MCH   33.8   (25.0-35.0)  pg


 


MCHC   31.4   (31.0-37.0)  g/dL


 


RDW   15.4   (11.5-15.5)  %


 


Plt Count   233   (150-450)  k/uL


 


MPV   7.7   


 


Neutrophils %     %


 


Neutrophils % (Manual)   71   %


 


Band Neuts % (Manual)   1   %


 


Lymphocytes %     %


 


Lymphocytes % (Manual)   15   %


 


Monocytes %     %


 


Monocytes % (Manual)   9   %


 


Eosinophils %     %


 


Eosinophils % (Manual)   3   %


 


Basophils %     %


 


Basophils % (Manual)   1   %


 


Neutrophils #     (1.3-7.7)  k/uL


 


Neutrophils # (Manual)   5.10   (1.3-7.7)  k/uL


 


Lymphocytes #     (1.0-4.8)  k/uL


 


Lymphocytes # (Manual)   1.08   (1.0-4.8)  k/uL


 


Monocytes #     (0-1.0)  k/uL


 


Monocytes # (Manual)   0.65   (0-1.0)  k/uL


 


Eosinophils #     (0-0.7)  k/uL


 


Eosinophils # (Manual)   0.22   (0-0.7)  k/uL


 


Basophils #     (0-0.2)  k/uL


 


Basophils # (Manual)   0.07   (0-0.2)  k/uL


 


Nucleated RBCs   0   (0-0)  /100 WBC


 


Manual Slide Review   Performed   


 


Hypochromasia   Moderate   


 


Poikilocytosis (manual     


 


Macrocytosis   Marked A   


 


PT  23.2 H    (9.0-12.0)  sec


 


INR  2.4 H    (<1.2)  


 


APTT     (22.0-30.0)  sec


 


Sodium    148 H  (137-145)  mmol/L


 


Potassium    3.6  (3.5-5.1)  mmol/L


 


Chloride    106  ()  mmol/L


 


Carbon Dioxide    39 H  (22-30)  mmol/L


 


Anion Gap    3  mmol/L


 


BUN    48 H  (9-20)  mg/dL


 


Creatinine    1.19  (0.66-1.25)  mg/dL


 


Est GFR (CKD-EPI)AfAm    67  (>60 ml/min/1.73 sqM)  


 


Est GFR (CKD-EPI)NonAf    58  (>60 ml/min/1.73 sqM)  


 


Glucose    75  (74-99)  mg/dL


 


POC Glucose (mg/dL)     (75-99)  mg/dL


 


POC Glu Operater ID     


 


Estimated Ave Glu mg/dL     


 


Hemoglobin A1c     (4.0-6.0)  %


 


Calcium    8.4  (8.4-10.2)  mg/dL


 


Total Bilirubin     (0.2-1.3)  mg/dL


 


AST     (17-59)  U/L


 


ALT     (4-49)  U/L


 


Alkaline Phosphatase     ()  U/L


 


Ammonia     (<30)  umol/L


 


Creatine Kinase     ()  U/L


 


Troponin I     (0.000-0.034)  ng/mL


 


Total Protein     (6.3-8.2)  g/dL


 


Albumin     (3.5-5.0)  g/dL


 


Urine Color     


 


Urine Appearance     (Clear)  


 


Urine pH     (5.0-8.0)  


 


Ur Specific Gravity     (1.001-1.035)  


 


Urine Protein     (Negative)  


 


Urine Glucose (UA)     (Negative)  


 


Urine Ketones     (Negative)  


 


Urine Blood     (Negative)  


 


Urine Nitrite     (Negative)  


 


Urine Bilirubin     (Negative)  


 


Urine Urobilinogen     (<2.0)  mg/dL


 


Ur Leukocyte Esterase     (Negative)  


 


Coronavirus (PCR)     (Not Detectd)  














  04/26/21 Range/Units





  11:49 


 


WBC   (3.8-10.6)  k/uL


 


RBC   (4.30-5.90)  m/uL


 


Hgb   (13.0-17.5)  gm/dL


 


Hct   (39.0-53.0)  %


 


MCV   (80.0-100.0)  fL


 


MCH   (25.0-35.0)  pg


 


MCHC   (31.0-37.0)  g/dL


 


RDW   (11.5-15.5)  %


 


Plt Count   (150-450)  k/uL


 


MPV   


 


Neutrophils %   %


 


Neutrophils % (Manual)   %


 


Band Neuts % (Manual)   %


 


Lymphocytes %   %


 


Lymphocytes % (Manual)   %


 


Monocytes %   %


 


Monocytes % (Manual)   %


 


Eosinophils %   %


 


Eosinophils % (Manual)   %


 


Basophils %   %


 


Basophils % (Manual)   %


 


Neutrophils #   (1.3-7.7)  k/uL


 


Neutrophils # (Manual)   (1.3-7.7)  k/uL


 


Lymphocytes #   (1.0-4.8)  k/uL


 


Lymphocytes # (Manual)   (1.0-4.8)  k/uL


 


Monocytes #   (0-1.0)  k/uL


 


Monocytes # (Manual)   (0-1.0)  k/uL


 


Eosinophils #   (0-0.7)  k/uL


 


Eosinophils # (Manual)   (0-0.7)  k/uL


 


Basophils #   (0-0.2)  k/uL


 


Basophils # (Manual)   (0-0.2)  k/uL


 


Nucleated RBCs   (0-0)  /100 WBC


 


Manual Slide Review   


 


Hypochromasia   


 


Poikilocytosis (manual   


 


Macrocytosis   


 


PT   (9.0-12.0)  sec


 


INR   (<1.2)  


 


APTT   (22.0-30.0)  sec


 


Sodium   (137-145)  mmol/L


 


Potassium   (3.5-5.1)  mmol/L


 


Chloride   ()  mmol/L


 


Carbon Dioxide   (22-30)  mmol/L


 


Anion Gap   mmol/L


 


BUN   (9-20)  mg/dL


 


Creatinine   (0.66-1.25)  mg/dL


 


Est GFR (CKD-EPI)AfAm   (>60 ml/min/1.73 sqM)  


 


Est GFR (CKD-EPI)NonAf   (>60 ml/min/1.73 sqM)  


 


Glucose   (74-99)  mg/dL


 


POC Glucose (mg/dL)  143 H  (75-99)  mg/dL


 


POC Glu Operater ID  Nicolette Bell  


 


Estimated Ave Glu mg/dL   


 


Hemoglobin A1c   (4.0-6.0)  %


 


Calcium   (8.4-10.2)  mg/dL


 


Total Bilirubin   (0.2-1.3)  mg/dL


 


AST   (17-59)  U/L


 


ALT   (4-49)  U/L


 


Alkaline Phosphatase   ()  U/L


 


Ammonia   (<30)  umol/L


 


Creatine Kinase   ()  U/L


 


Troponin I   (0.000-0.034)  ng/mL


 


Total Protein   (6.3-8.2)  g/dL


 


Albumin   (3.5-5.0)  g/dL


 


Urine Color   


 


Urine Appearance   (Clear)  


 


Urine pH   (5.0-8.0)  


 


Ur Specific Gravity   (1.001-1.035)  


 


Urine Protein   (Negative)  


 


Urine Glucose (UA)   (Negative)  


 


Urine Ketones   (Negative)  


 


Urine Blood   (Negative)  


 


Urine Nitrite   (Negative)  


 


Urine Bilirubin   (Negative)  


 


Urine Urobilinogen   (<2.0)  mg/dL


 


Ur Leukocyte Esterase   (Negative)  


 


Coronavirus (PCR)   (Not Detectd)  














Disposition


Clinical Impression: 


 Acute encephalopathy





Disposition: ADMITTED AS IP TO THIS HOSP


Condition: Stable


Is patient prescribed a controlled substance at d/c from ED?: No


Decision to Admit Reason: Admit from EC


Decision Date: 04/22/21


Decision Time: 12:43

## 2021-04-22 NOTE — P.EPPROC
- EP Procedure Note


Electrophysiology Procedure Note: 





Procedures





Cinefluoroscopy of the leads


Cinefluoroscopy of the leads was performed.  Dual-chamber ICD, [employer]


No fractures or breaks noted





Left upper extremity venogram


15 mL IV dye injected at the left arm


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 

which bypasses this occlusion


Cephalic vein is patent





Plan


Upgrade to a Bi V ICD


Patient is a high RV pacing percentage, congestive heart failure

## 2021-04-22 NOTE — CT
EXAMINATION TYPE: CT brain wo con

 

DATE OF EXAM: 4/22/2021

 

COMPARISON: 4/18/2021

 

HISTORY: Post OP Pacer placed 1 week ago.  Decreased mental status per patient family

 

CT DLP: 1111.4 mGycm

 

Unenhanced CT of the brain was performed.  

 

The ventricles, basal cisterns and sulci overlying the cerebral convexities demonstrate mild enlargem
ent. 

 

There is no evidence for intracranial hemorrhage or sulcal effacement.  

 

There is decreased attenuation about the periventricular white matter and deep white matter of both c
erebral hemispheres, compatible with chronic small vessel ischemia. Differential diagnosis does inclu
de demyelination. 

 

No mass effects are seen.No midline shift.

 

Osseous calvarium is intact.  Complete opacification right maxillary sinus.

 

If symptoms persist consider MRI.  

 

IMPRESSION:

 

1. Age related atrophic and chronic small vessel ischemic change without acute intracranial process s
een at this time.

## 2021-04-23 LAB
ANION GAP SERPL CALC-SCNC: 4 MMOL/L
BASOPHILS # BLD AUTO: 0 K/UL (ref 0–0.2)
BASOPHILS NFR BLD AUTO: 0 %
BUN SERPL-SCNC: 74 MG/DL (ref 9–20)
CALCIUM SPEC-MCNC: 8.1 MG/DL (ref 8.4–10.2)
CHLORIDE SERPL-SCNC: 105 MMOL/L (ref 98–107)
CO2 SERPL-SCNC: 34 MMOL/L (ref 22–30)
EOSINOPHIL # BLD AUTO: 0.2 K/UL (ref 0–0.7)
EOSINOPHIL NFR BLD AUTO: 2 %
ERYTHROCYTE [DISTWIDTH] IN BLOOD BY AUTOMATED COUNT: 3.71 M/UL (ref 4.3–5.9)
ERYTHROCYTE [DISTWIDTH] IN BLOOD: 15.6 % (ref 11.5–15.5)
GLUCOSE BLD-MCNC: 100 MG/DL (ref 75–99)
GLUCOSE BLD-MCNC: 88 MG/DL (ref 75–99)
GLUCOSE BLD-MCNC: 89 MG/DL (ref 75–99)
GLUCOSE SERPL-MCNC: 81 MG/DL (ref 74–99)
HCT VFR BLD AUTO: 40.3 % (ref 39–53)
HGB BLD-MCNC: 12.1 GM/DL (ref 13–17.5)
INR PPP: 1.8 (ref ?–1.2)
LYMPHOCYTES # SPEC AUTO: 1.9 K/UL (ref 1–4.8)
LYMPHOCYTES NFR SPEC AUTO: 22 %
MCH RBC QN AUTO: 32.8 PG (ref 25–35)
MCHC RBC AUTO-ENTMCNC: 30.1 G/DL (ref 31–37)
MCV RBC AUTO: 108.7 FL (ref 80–100)
MONOCYTES # BLD AUTO: 0.5 K/UL (ref 0–1)
MONOCYTES NFR BLD AUTO: 6 %
NEUTROPHILS # BLD AUTO: 5.8 K/UL (ref 1.3–7.7)
NEUTROPHILS NFR BLD AUTO: 67 %
PLATELET # BLD AUTO: 209 K/UL (ref 150–450)
POTASSIUM SERPL-SCNC: 3.5 MMOL/L (ref 3.5–5.1)
PT BLD: 18.2 SEC (ref 9–12)
SODIUM SERPL-SCNC: 143 MMOL/L (ref 137–145)
WBC # BLD AUTO: 8.7 K/UL (ref 3.8–10.6)

## 2021-04-23 RX ADMIN — ATORVASTATIN CALCIUM SCH MG: 20 TABLET, FILM COATED ORAL at 20:51

## 2021-04-23 RX ADMIN — WARFARIN SODIUM SCH MG: 2.5 TABLET ORAL at 20:51

## 2021-04-23 RX ADMIN — FINASTERIDE SCH MG: 5 TABLET, FILM COATED ORAL at 20:51

## 2021-04-23 RX ADMIN — METOPROLOL SUCCINATE SCH MG: 100 TABLET, EXTENDED RELEASE ORAL at 08:25

## 2021-04-23 RX ADMIN — METOPROLOL SUCCINATE SCH MG: 100 TABLET, EXTENDED RELEASE ORAL at 20:51

## 2021-04-23 RX ADMIN — LEVOTHYROXINE SODIUM SCH MCG: 25 TABLET ORAL at 20:51

## 2021-04-23 NOTE — P.HPIM
History of Present Illness


Chief Complaint: Altered mental status.





The patient is a 76-year-old white male who recently had pacemaker placement and

has had previous episode of altered mental status.  He was unarousable yesterday

and the workup is negative for acute CVA.  He is a diabetic who has underlying 

history of pacemaker and hypertension with hypothyroidism.  The patient is not 

arousable but still somewhat disoriented to time.  No head trauma no loss 

consciousness.  No history of seizure disorder.





Review of Systems


All systems: negative


Constitutional: Denies chills, Denies fever


Eyes: denies blurred vision, denies pain


Ears, nose, mouth and throat: Denies headache, Denies sore throat


Cardiovascular: Denies chest pain, Denies shortness of breath


Respiratory: Denies cough


Gastrointestinal: Denies abdominal pain, Denies diarrhea, Denies nausea, Denies 

vomiting


Neurological: Reports as per HPI


Psychiatric: Denies anxiety, Denies depression


Endocrine: Denies fatigue, Denies weight change





Past Medical History


Past Medical History: Diabetes Mellitus, Eye Disorder, GERD/Reflux, 

Hyperlipidemia, Hypertension, Memory Impairment, Myocardial Infarction (MI), 

Prostate Disorder, Thyroid Disorder


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 

WITH HALLUCINATIONS.  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

and now resolved, SEE DR CHINCHILLA'S HISTORY AND PHYSICAL FOR CARDIAC HISTORY, 

CATARACT RIGHT EYE.  lewy body dementia with parkinsons recently increase upper 

body movement especially at night


Last Myocardial Infarction Date:: 1993


History of Any Multi-Drug Resistant Organisms: None Reported


Past Surgical History: AICD, Heart Catheterization With Stent, Pacemaker


Additional Past Surgical History / Comment(s): SEVERAL CARDIOVERSIONS.  LT 

CATARACT REMOVED


Past Anesthesia/Blood Transfusion Reactions: No Reported Reaction


Date of Last Stent Placement:: 1993


Type of Cardiac Device: Permanent Pacemaker, AICD


Device Placement Date:: 1994, 11/2010


Past Psychological History: Anxiety


Smoking Status: Former smoker


Past Alcohol Use History: Rare


Past Drug Use History: None Reported





- Past Family History


  ** Mother


Additional Family Medical History / Comment(s): SEVERE LEG EDEMA





Medications and Allergies


                                Home Medications











 Medication  Instructions  Recorded  Confirmed  Type


 


Atorvastatin [Lipitor] 20 mg PO HS 04/09/18 04/22/21 History


 


Famotidine [Pepcid] 20 mg PO DAILY@0600 04/09/18 04/22/21 History


 


Finasteride [Proscar] 5 mg PO HS 04/09/18 04/22/21 History


 


Furosemide [Lasix] 40 mg PO DAILY@0600 04/09/18 04/22/21 History


 


allopurinoL [Zyloprim] 300 mg PO HS 04/09/18 04/22/21 History


 


glipiZIDE [Glucotrol] 2.5 mg PO BID 04/09/18 04/22/21 History


 


Warfarin [Coumadin] 2.5 mg PO DAILY@1600 11/15/18 04/22/21 History


 


QUEtiapine [SEROquel] 50 mg PO HS 03/31/21 04/22/21 History


 


rOPINIRole HCL [Requip] 0.25 mg PO HS 04/01/21 04/22/21 History


 


Benazepril HCl 20 mg PO HS@2000 04/12/21 04/22/21 History


 


Levothyroxine Sodium 25 mcg PO HS 04/12/21 04/22/21 History


 


bisacodyL [Dulcolax] 5 mg PO DAILY PRN 04/12/21 04/22/21 History


 


Metoprolol Succinate (ER) [Toprol 100 mg PO BID@0800,2000 04/18/21 04/22/21 

History





XL]    


 


Isosorbide Mononitrate ER [Imdur] 30 mg PO DAILY@0600 04/22/21 04/22/21 History


 


Multivitamins, Thera [Multivitamin 1 tab PO DAILY@0600 04/22/21 04/22/21 History





(formulary)]    


 


Spironolactone [Aldactone] 25 mg PO DAILY@0600 04/22/21 04/22/21 History








                                    Allergies











Allergy/AdvReac Type Severity Reaction Status Date / Time


 


No Known Allergies Allergy   Verified 04/22/21 10:36














Physical Exam


Vitals: 


                                   Vital Signs











  Temp Pulse Resp BP Pulse Ox


 


 04/23/21 04:00   67  18  94/60  100


 


 04/23/21 01:35  97.9 F  74  18  98/67  99


 


 04/22/21 23:00  98.1 F  73  110 H  


 


 04/22/21 22:00   70  17  118/70  95


 


 04/22/21 21:00   74  18  107/74  98


 


 04/22/21 20:00   69  11 L  115/76  98


 


 04/22/21 19:00  98.0 F  74  21  108/63  95


 


 04/22/21 18:00   68  20  101/65 


 


 04/22/21 17:00   74  25 H  105/63 


 


 04/22/21 16:00   71  23  104/61  94 L


 


 04/22/21 15:00   74  22  107/64  94 L


 


 04/22/21 14:00   72  24  107/78  95


 


 04/22/21 13:00   76  26 H  97/61  100


 


 04/22/21 11:25   73  14  107/64  95


 


 04/22/21 10:25      98


 


 04/22/21 10:18  97.6 F  69  16  97/62  92 L














- Constitutional


General appearance: cooperative, no acute distress





- EENT


Eyes: EOMI





- Neck


Neck: no lymphadenopathy





- Respiratory


Respiratory: bilateral: diminished





- Cardiovascular


Heart sounds: normal: S1, S2


Abnormal Heart Sounds: no S3 Gallop





- Gastrointestinal


General gastrointestinal: soft, no tenderness





- Integumentary


Integumentary: no cellulitis





- Neurologic


Neurologic: focal deficits





- Musculoskeletal


Musculoskeletal: strength equal bilaterally





- Psychiatric


Psychiatric: appropriate affect





Results


CBC & Chem 7: 


                                 04/22/21 10:35





                                 04/22/21 10:35


Labs: 


                  Abnormal Lab Results - Last 24 Hours (Table)











  04/22/21 04/22/21 04/22/21 Range/Units





  10:35 10:35 10:35 


 


RBC  3.75 L    (4.30-5.90)  m/uL


 


Hgb  12.5 L    (13.0-17.5)  gm/dL


 


MCV  105.8 H    (80.0-100.0)  fL


 


RDW  15.8 H    (11.5-15.5)  %


 


PT   18.9 H   (9.0-12.0)  sec


 


INR   1.9 H   (<1.2)  


 


Carbon Dioxide    33 H  (22-30)  mmol/L


 


BUN    79 H  (9-20)  mg/dL


 


Creatinine    1.84 H  (0.66-1.25)  mg/dL


 


Calcium    8.0 L  (8.4-10.2)  mg/dL


 


Creatine Kinase    54 L  ()  U/L


 


Troponin I     (0.000-0.034)  ng/mL


 


Total Protein    5.0 L  (6.3-8.2)  g/dL


 


Albumin    2.6 L  (3.5-5.0)  g/dL


 


Urine Protein     (Negative)  














  04/22/21 04/22/21 04/22/21 Range/Units





  10:35 11:07 16:41 


 


RBC     (4.30-5.90)  m/uL


 


Hgb     (13.0-17.5)  gm/dL


 


MCV     (80.0-100.0)  fL


 


RDW     (11.5-15.5)  %


 


PT     (9.0-12.0)  sec


 


INR     (<1.2)  


 


Carbon Dioxide     (22-30)  mmol/L


 


BUN     (9-20)  mg/dL


 


Creatinine     (0.66-1.25)  mg/dL


 


Calcium     (8.4-10.2)  mg/dL


 


Creatine Kinase     ()  U/L


 


Troponin I  0.401 H*   0.288 H*  (0.000-0.034)  ng/mL


 


Total Protein     (6.3-8.2)  g/dL


 


Albumin     (3.5-5.0)  g/dL


 


Urine Protein   Trace H   (Negative)  














  04/22/21 Range/Units





  22:40 


 


RBC   (4.30-5.90)  m/uL


 


Hgb   (13.0-17.5)  gm/dL


 


MCV   (80.0-100.0)  fL


 


RDW   (11.5-15.5)  %


 


PT   (9.0-12.0)  sec


 


INR   (<1.2)  


 


Carbon Dioxide   (22-30)  mmol/L


 


BUN   (9-20)  mg/dL


 


Creatinine   (0.66-1.25)  mg/dL


 


Calcium   (8.4-10.2)  mg/dL


 


Creatine Kinase   ()  U/L


 


Troponin I  0.257 H*  (0.000-0.034)  ng/mL


 


Total Protein   (6.3-8.2)  g/dL


 


Albumin   (3.5-5.0)  g/dL


 


Urine Protein   (Negative)  














Assessment and Plan


(1) Acute encephalopathy


Current Visit: Yes   Status: Acute   Code(s): G93.40 - ENCEPHALOPATHY, UNSPECIF

IED   SNOMED Code(s): 57658945


   





(2) Altered mental status


Current Visit: No   Status: Acute   Code(s): R41.82 - ALTERED MENTAL STATUS, 

UNSPECIFIED   SNOMED Code(s): 540413883


   





(3) Dementia


Current Visit: No   Status: Acute   Code(s): F03.90 - UNSPECIFIED DEMENTIA WI

THOUT BEHAVIORAL DISTURBANCE   SNOMED Code(s): 08640261


   





(4) Hypoxia


Current Visit: No   Status: Acute   Code(s): R09.02 - HYPOXEMIA   SNOMED 

Code(s): 205351514


   


Plan: 





The patient seems to be improving from yesterday's baseline of poor 

arousability.





Ask neurology to evaluate.





Reconcile medications.





Hold blood pressure medications if systolic less than 1 10 mmHg.





Prognosis is guarded secondary to his multiple comorbidities.

## 2021-04-23 NOTE — P.CNNES
History of Present Illness


Consult date: 04/23/21


Requesting physician: Blanca Bentley


Reason for Consult: Acute encephalopathy


History of Present Illness: 





Patient is a 76-year-old male with history of Parkinson's disease, diabetes, 

pacemaker, who lives in [facility], came to the hospital by ambulance yesterday 

at 10:16 AM  According to EMS flow sheet, when they arrived, found patient in 

care of staff.  Per staff they were walking by and saw patient unresponsive, 

slumped over his desk and was not arousable.  They were unsure how long the 

patient had maintained this state.  Unknown last known well.  Patient is alert 

and oriented 0, not responding to any stimuli.  Patient's blood pressure at the

scene was 90/60, pulse rate 82, saturation 97% blood sugar 126.





Vital signs on arrival blood pressure 97/62, pulse rate 69, temperature 97.6.  

CT head showed age-related atrophic and chronic small vessel ischemic changes 

without acute intracranial process seen at this time.  EKG shows electronic 

ventricular pacemaker.  Blood test shows normal WBC, hemoglobin 12.5 with 

elevated .8.  Platelets are 184.  INR is 1.9, PTT 28.9.  Electrolytes are

normal, BUN 79, creatinine 1.84.  Patient's troponin is mildly elevated 0.401.  

UA is negative.  Corona virus PCR negative.  Patient's vitamin B12 864 on 

02/11/2020.





Patient at the present states that he is living in [facility], states woke up in 

a different place.  Patient at present keeps on saying, "I want to go home".  

Patient states that he never had any history of seizures.  He says that he has 

battery of defibrillator changed last week.  Per nurse report now, he is having 

some episodes of agitation, but is getting more responsive.  In the morning he 

was lethargic and difficult to arouse but now is getting more awake and alert.





Patient has diabetes for 10-12 years.





Patient's wife later arrived, who provided further piece of history.  She states

that patient underwent pacemaker placement on last Thursday 04/15/2021.  After 

he underwent pacemaker placement, he could not wake up and then could not stand.

 He just laid there.  He was kept overnight and then discharged the next day to 

[facility].  The next day on Saturday he had an episode of unresponsiveness, very

lethargic.  He was brought back to the ER, where he was checked out and was 

released back to the facility.  He was fine for next 4 days, when yesterday he 

had this spell as mentioned above.





Review of Systems





Patient denies headache, denies any problem with the vision, hoarseness, sore 

throat.  Denies any chest pain, shortness of breath, wheezing or cough.  Denies 

abdominal pain, nausea vomiting diarrhea.  Denies any rash.  His peripheral 

edema.  All other review of systems unremarkable.





Past Medical History


Past Medical History: Diabetes Mellitus, Eye Disorder, GERD/Reflux, 

Hyperlipidemia, Hypertension, Memory Impairment, Myocardial Infarction (MI), 

Prostate Disorder, Thyroid Disorder


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 

WITH HALLUCINATIONS.  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

and now resolved, SEE DR CHINCHILLA'S HISTORY AND PHYSICAL FOR CARDIAC HISTORY, 

CATARACT RIGHT EYE.  lewy body dementia with parkinsons recently increase upper 

body movement especially at night


Last Myocardial Infarction Date:: 1993


History of Any Multi-Drug Resistant Organisms: None Reported


Past Surgical History: AICD, Heart Catheterization With Stent, Pacemaker


Additional Past Surgical History / Comment(s): SEVERAL CARDIOVERSIONS.  LT 

CATARACT REMOVED


Past Anesthesia/Blood Transfusion Reactions: No Reported Reaction


Date of Last Stent Placement:: 1993


Type of Cardiac Device: Permanent Pacemaker, AICD


Device Placement Date:: 1994, 11/2010


Past Psychological History: Anxiety


Smoking Status: Former smoker


Past Alcohol Use History: Rare


Past Drug Use History: None Reported





- Past Family History


  ** Mother


Additional Family Medical History / Comment(s): SEVERE LEG EDEMA





Medications and Allergies


                                Home Medications











 Medication  Instructions  Recorded  Confirmed  Type


 


Atorvastatin [Lipitor] 20 mg PO HS 04/09/18 04/22/21 History


 


Famotidine [Pepcid] 20 mg PO DAILY@0600 04/09/18 04/22/21 History


 


Finasteride [Proscar] 5 mg PO HS 04/09/18 04/22/21 History


 


Furosemide [Lasix] 40 mg PO DAILY@0600 04/09/18 04/22/21 History


 


allopurinoL [Zyloprim] 300 mg PO HS 04/09/18 04/22/21 History


 


glipiZIDE [Glucotrol] 2.5 mg PO BID 04/09/18 04/22/21 History


 


Warfarin [Coumadin] 2.5 mg PO DAILY@1600 11/15/18 04/22/21 History


 


QUEtiapine [SEROquel] 50 mg PO HS 03/31/21 04/22/21 History


 


rOPINIRole HCL [Requip] 0.25 mg PO HS 04/01/21 04/22/21 History


 


Benazepril HCl 20 mg PO HS@2000 04/12/21 04/22/21 History


 


Levothyroxine Sodium 25 mcg PO HS 04/12/21 04/22/21 History


 


bisacodyL [Dulcolax] 5 mg PO DAILY PRN 04/12/21 04/22/21 History


 


Metoprolol Succinate (ER) [Toprol 100 mg PO BID@0800,2000 04/18/21 04/22/21 

History





XL]    


 


Isosorbide Mononitrate ER [Imdur] 30 mg PO DAILY@0600 04/22/21 04/22/21 History


 


Multivitamins, Thera [Multivitamin 1 tab PO DAILY@0600 04/22/21 04/22/21 History





(formulary)]    


 


Spironolactone [Aldactone] 25 mg PO DAILY@0600 04/22/21 04/22/21 History








                                    Allergies











Allergy/AdvReac Type Severity Reaction Status Date / Time


 


No Known Allergies Allergy   Verified 04/22/21 10:36














Physical Examination





- Vital Signs


Vital Signs: 


                                   Vital Signs











  Temp Pulse Resp BP Pulse Ox


 


 04/23/21 08:00   76   103/60 


 


 04/23/21 04:00   67  18  94/60  100


 


 04/23/21 01:35  97.9 F  74  18  98/67  99


 


 04/22/21 23:00  98.1 F  73  110 H  


 


 04/22/21 22:00   70  17  118/70  95


 


 04/22/21 21:00   74  18  107/74  98


 


 04/22/21 20:00   69  11 L  115/76  98


 


 04/22/21 19:00  98.0 F  74  21  108/63  95


 


 04/22/21 18:00   68  20  101/65 


 


 04/22/21 17:00   74  25 H  105/63 


 


 04/22/21 16:00   71  23  104/61  94 L


 


 04/22/21 15:00   74  22  107/64  94 L


 


 04/22/21 14:00   72  24  107/78  95


 


 04/22/21 13:00   76  26 H  97/61  100


 


 04/22/21 11:25   73  14  107/64  95














Patient is an elderly  male, in no acute distress.  He states it is 

April 13 and the year is 21.  He knows that he is in [facility] in [place], 

but states he is in high school "[employer]".  He does not know the 

name of the current president although knows the last president was Migd[place].  He 

states he has 2 boys.  Patient states that he lives in [place] in [place] 

in [facility]. Speech and language functions are normal.  Patient can nam

e, repeat without any problem.  Attention, concentration and fund of knowledge 

is adequate.





On cranial examination, pupils are surgical, round and reacting to light, visual

 fields are full on confrontation, extraocular muscles are intact with no 

nystagmus.  Face is symmetric, tongue protrudes to the midline.  Palatal 

elevation and sensation normal, hearing is slightly decreased for finger rubbing

 and shoulder shrug normal, facial sensation normal.  Shoulder shrug normal.





On muscle strength testing, there is no pronator drift and the strength is 

normal in arms distally and proximally.  In the lower limbs hip flexion is 4/4-,

 knee extension is normal, ankle dorsiflexion 5/4+5-, toe extension 5/3





Deep tendon reflexes 1+ to 2 in the upper extremities, 2+ at the right knee, 1+ 

to 2 in the left knee.  Ankles are 1+ and plantars are downgoing.  No clonus.





Sensory to touch is equal with no neglect.





Cerebellar function showed no ataxia for finger-to-nose testing.  No 

dysdiadochokinesia.  Tone and bulk of muscles normal.





Gait deferred.





On general examination, there is no carotid bruit or murmur, S1-S2 audible.  

Abdomen is soft nontender.  Chest is clear.  Peripheral pulses are present.  

Moderate peripheral edema.





Results





- Laboratory Findings


CBC and BMP: 


                                 04/23/21 10:32





                                 04/23/21 10:32


Abnormal Lab Findings: 


                                  Abnormal Labs











  04/22/21 04/22/21 04/22/21





  10:35 10:35 10:35


 


RBC  3.75 L  


 


Hgb  12.5 L  


 


MCV  105.8 H  


 


RDW  15.8 H  


 


PT   18.9 H 


 


INR   1.9 H 


 


Carbon Dioxide    33 H


 


BUN    79 H


 


Creatinine    1.84 H


 


Calcium    8.0 L


 


Creatine Kinase    54 L


 


Troponin I   


 


Total Protein    5.0 L


 


Albumin    2.6 L


 


Urine Protein   














  04/22/21 04/22/21 04/22/21





  10:35 11:07 16:41


 


RBC   


 


Hgb   


 


MCV   


 


RDW   


 


PT   


 


INR   


 


Carbon Dioxide   


 


BUN   


 


Creatinine   


 


Calcium   


 


Creatine Kinase   


 


Troponin I  0.401 H*   0.288 H*


 


Total Protein   


 


Albumin   


 


Urine Protein   Trace H 














  04/22/21





  22:40


 


RBC 


 


Hgb 


 


MCV 


 


RDW 


 


PT 


 


INR 


 


Carbon Dioxide 


 


BUN 


 


Creatinine 


 


Calcium 


 


Creatine Kinase 


 


Troponin I  0.257 H*


 


Total Protein 


 


Albumin 


 


Urine Protein 














Assessment and Plan


Assessment: 





* Episode of unresponsiveness, unclear etiology.  Differential includes 

  vasovagal syncope, seizure versus arrhythmia.  Patient does have a 

  defibrillator placed, and the battery changed recently.


* Mild cognitive impairment.


* Diabetes


* Hypertension


* Hyperlipidemia


* Coronary artery disease


Plan: 





* EEG was performed today, which was abnormal due to background slowing of mild 

  to moderate degree.  This is suggestive of generalized cerebral dysfunction, 

  as can be seen with toxic metabolic encephalopathy or due to diffuse 

  structural brain abnormality.  No epileptiform activity was seen.  EKG channel

   showed arrhythmia.  Clinical correlation recommended.


* Suggest cardiology consultation to rule out arrhythmia and also for elevated 

  cardiac enzymes.  Patient also on 5 different blood pressure medications, and 

  his blood pressure was low at the scene 90/60.  May need to adjust blood 

  pressure medications.


* Carotid Doppler.


* Patient on the floor at around 5 PM had another episode of unresponsiveness. 

  The dinner was being served, and he was joking and suddenly became asleep, 

  unresponsive.  No arrhythmias documented at that time.  He could not be 

  aroused.  Apparently his blood pressure was again running around 94-97 

  systolic and in 60s diastolic.  Likely hypotension.  Patient definitely needs 

  adjustment of blood pressure medications.

## 2021-04-23 NOTE — EEG
ELECTROENCEPHALOGRAM REPORT



DATE OF SERVICE:

04/23/2021.



PREAMBLE:

This is a 76-year-old male who had a pacemaker replaced last Thursday.  Post surgery,

patient was not able to walk and was sent to [facility].  The patient had an episode of

unresponsiveness.  EEG performed to rule out any epileptiform activity.



EEG FINDINGS:

This is a 21 channel routine EEG recording in a patient utilizing 10/20 international

system with referential and bipolar montages.  The background consists of moderately

well-developed, but poorly regulated, mixed frequencies of predominantly 4-7 hertz

theta mixed with some moderate amplitude delta activity.  Background does not seem to

be clearly reactive to eye opening and closing.  Photic driving response was not seen.

Different stages of sleep were not seen.  No focal or generalized epileptiform activity

was seen.



EKG channel showed frequent arrhythmia.



IMPRESSION:

This is an abnormal EEG due to background slowing of mild to moderate degree.  This is

suggestive of generalized cerebral dysfunction as can be seen with toxic metabolic

encephalopathy or due to diffuse structural brain abnormality.  No epileptiform

activity was seen.  EKG channel showed arrhythmia.  Clinical correlation recommended.





MMODL / IJN: [id number] / Job#: [id number]

## 2021-04-24 LAB
ANION GAP SERPL CALC-SCNC: 5 MMOL/L
BUN SERPL-SCNC: 67 MG/DL (ref 9–20)
CALCIUM SPEC-MCNC: 8.2 MG/DL (ref 8.4–10.2)
CHLORIDE SERPL-SCNC: 107 MMOL/L (ref 98–107)
CO2 SERPL-SCNC: 32 MMOL/L (ref 22–30)
ERYTHROCYTE [DISTWIDTH] IN BLOOD BY AUTOMATED COUNT: 3.79 M/UL (ref 4.3–5.9)
ERYTHROCYTE [DISTWIDTH] IN BLOOD: 15.4 % (ref 11.5–15.5)
GLUCOSE BLD-MCNC: 110 MG/DL (ref 75–99)
GLUCOSE BLD-MCNC: 114 MG/DL (ref 75–99)
GLUCOSE BLD-MCNC: 63 MG/DL (ref 75–99)
GLUCOSE BLD-MCNC: 81 MG/DL (ref 75–99)
GLUCOSE BLD-MCNC: 82 MG/DL (ref 75–99)
GLUCOSE SERPL-MCNC: 72 MG/DL (ref 74–99)
HBA1C MFR BLD: 5.5 % (ref 4–6)
HCT VFR BLD AUTO: 40.8 % (ref 39–53)
HGB BLD-MCNC: 12.6 GM/DL (ref 13–17.5)
INR PPP: 1.9 (ref ?–1.2)
MCH RBC QN AUTO: 33.1 PG (ref 25–35)
MCHC RBC AUTO-ENTMCNC: 30.8 G/DL (ref 31–37)
MCV RBC AUTO: 107.7 FL (ref 80–100)
PLATELET # BLD AUTO: 176 K/UL (ref 150–450)
POTASSIUM SERPL-SCNC: 3.6 MMOL/L (ref 3.5–5.1)
PT BLD: 19 SEC (ref 9–12)
SODIUM SERPL-SCNC: 144 MMOL/L (ref 137–145)
WBC # BLD AUTO: 7.4 K/UL (ref 3.8–10.6)

## 2021-04-24 RX ADMIN — ATORVASTATIN CALCIUM SCH MG: 20 TABLET, FILM COATED ORAL at 20:49

## 2021-04-24 RX ADMIN — SPIRONOLACTONE SCH MG: 25 TABLET, FILM COATED ORAL at 09:56

## 2021-04-24 RX ADMIN — METOPROLOL SUCCINATE SCH MG: 100 TABLET, EXTENDED RELEASE ORAL at 20:49

## 2021-04-24 RX ADMIN — LEVOTHYROXINE SODIUM SCH MCG: 25 TABLET ORAL at 20:49

## 2021-04-24 RX ADMIN — METOPROLOL SUCCINATE SCH MG: 100 TABLET, EXTENDED RELEASE ORAL at 09:56

## 2021-04-24 RX ADMIN — THERA TABS SCH EACH: TAB at 09:56

## 2021-04-24 RX ADMIN — ACETAMINOPHEN PRN MG: 500 TABLET ORAL at 12:50

## 2021-04-24 RX ADMIN — FINASTERIDE SCH MG: 5 TABLET, FILM COATED ORAL at 20:49

## 2021-04-24 RX ADMIN — ISOSORBIDE MONONITRATE SCH MG: 30 TABLET, EXTENDED RELEASE ORAL at 09:56

## 2021-04-24 RX ADMIN — FAMOTIDINE SCH MG: 20 TABLET, FILM COATED ORAL at 09:56

## 2021-04-24 RX ADMIN — WARFARIN SODIUM SCH MG: 2.5 TABLET ORAL at 17:02

## 2021-04-24 RX ADMIN — FUROSEMIDE SCH MG: 40 TABLET ORAL at 09:56

## 2021-04-24 NOTE — P.PN
Subjective


Progress Note Date: 04/24/21


Principal diagnosis: 


Encephalopathy





covering for Dr. Villalta over the weekend





Mr. Motley is a 76-year-old gentleman with a past medical history of 

hypertension, diabetes mellitus, hyperlipidemia,  Lewy body dementiabrought in 

for altered mental status changes.  Patient recently had successful upgrade to 

biventricular AICD and underwent cardioversion.  After the procedure patient had

some confusion so was monitored for a few hours and later on discharged to 

subacute rehab.  At the subacute rehab patient was found slumped over and 

unresponsive and when they checked his initial blood pressure it was found to be

90 x 60.  Patient had a computed tomography scan in the ER showing age-related 

atrophic changes and chronic small vessel changes without acute intracranial 

process.  His troponins were mildly elevated at 0.41, 0.28, 0.57.  At the time 

of admission his creatinine was elevated at 1.5 and currently it is at 1.3.  As 

per discussion with nursing staff he had another episode of unresponsiveness 

yesterday evening. Neurology has evaluated the patient yesterday and he had an 

EEG doneshowing abnormal due to background slowing of mild-to-moderate degree 

suggestive of generalized cerebral dysfunction.today on examination patient was 

comfortably lying in bed appears to be in no acute distress.  His wife is at 

bedside.  She mentions that she is confused at times.  Patient denies having any

chest pain or difficulty in breathing.  She denies having any weakness of his 

extremities.  He was able to tell me that he had the procedure done last week 

and later was sent to subacute rehab, he was not sure what happened there and 

eventually admitted to the hospital.








On reviewing the patient's vitals his temperature is 97.4, heart rate 80, 

respiratory rate 16, blood pressure 1 15 x 70, saturating at 99% on 3 L of 

oxygen.reviewing his labs white count of 7.4, hemoglobin 12.6 MCV-treated and 

7.7.  INR of 1.9.  Sodium 144, potassium 3.6, chloride 107, bicarb 32, BUN 1.84 

decreased to 1.3.  Patient is negative for corona Virus  PCR.and his UA is 

negative.





Objective





- Vital Signs


Vital signs: 


                                   Vital Signs











Temp  98.6 F   04/24/21 08:00


 


Pulse  74   04/24/21 08:00


 


Resp  16   04/24/21 08:00


 


BP  119/59   04/24/21 08:00


 


Pulse Ox  100   04/24/21 08:00








                                 Intake & Output











 04/23/21 04/24/21 04/24/21





 18:59 06:59 18:59


 


Intake Total 0  0


 


Balance 0  0


 


Weight  107.5 kg 107.5 kg


 


Intake:   


 


  Oral 0  0


 


Other:   


 


  Voiding Method   Diaper


 


  # Voids  1 1














- Exam





GENERAL: The patient is alert and oriented x 2-3, not in any acute distress. 

Elderly male 


HEENT: Pupils are round and equally reacting to light. EOMI. No scleral icterus.

No conjunctival pallor.


CARDIOVASCULAR: S1 and S2 present. No murmurs, rubs, or gallops. 


PULMONARY: Chest is clear to auscultation, decreased breath sounds at the lower 

lung bases.


ABDOMEN: Soft, nontender, nondistended, normoactive bowel sounds. 


MUSCULOSKELETAL: No joint swelling or deformity. 


EXTREMITIES: No cyanosis, clubbing, or pedal edema. 


NEUROLOGICAL: Gross neurological examination did not reveal any focal deficits. 

able to move all his Limbs


SKIN: No rashes. no petechiae.








- Labs


CBC & Chem 7: 


                                 04/24/21 08:32





                                 04/24/21 08:32


Labs: 


                  Abnormal Lab Results - Last 24 Hours (Table)











  04/23/21 04/24/21 04/24/21 Range/Units





  17:05 08:32 08:32 


 


RBC    3.79 L  (4.30-5.90)  m/uL


 


Hgb    12.6 L  (13.0-17.5)  gm/dL


 


MCV    107.7 H  (80.0-100.0)  fL


 


MCHC    30.8 L  (31.0-37.0)  g/dL


 


Macrocytosis    Marked A  


 


PT   19.0 H   (9.0-12.0)  sec


 


INR   1.9 H   (<1.2)  


 


Carbon Dioxide     (22-30)  mmol/L


 


BUN     (9-20)  mg/dL


 


Creatinine     (0.66-1.25)  mg/dL


 


Glucose     (74-99)  mg/dL


 


POC Glucose (mg/dL)  100 H    (75-99)  mg/dL


 


Calcium     (8.4-10.2)  mg/dL














  04/24/21 04/24/21 Range/Units





  08:32 11:38 


 


RBC    (4.30-5.90)  m/uL


 


Hgb    (13.0-17.5)  gm/dL


 


MCV    (80.0-100.0)  fL


 


MCHC    (31.0-37.0)  g/dL


 


Macrocytosis    


 


PT    (9.0-12.0)  sec


 


INR    (<1.2)  


 


Carbon Dioxide  32 H   (22-30)  mmol/L


 


BUN  67 H   (9-20)  mg/dL


 


Creatinine  1.30 H   (0.66-1.25)  mg/dL


 


Glucose  72 L   (74-99)  mg/dL


 


POC Glucose (mg/dL)   63 L  (75-99)  mg/dL


 


Calcium  8.2 L   (8.4-10.2)  mg/dL














Assessment and Plan


Assessment: 





ASSESSMENT








Acute encephalopathy


Acute kidney injury


Recentbiventricular AICD upgrade done on 04/15/2021


Ischemic cardiomyopathy


Persistent atrial fibrillation status post cardioversion 04/15/2021


Mildly elevated troponin


Lewy- body Dementia


Hypertension


Coronary artery disease


Hyperlipidemia





: PLAN - patient had episodes of unresponsiveness twice.  Workup in 

progress.Patient had CT of the brain that was showing chronic small vessel 

disease.  EEG showing generalized cerebral dysfunction. at the time of admission

patient had elevated creatinine , given IV fluids and creatinine is trending 

down.  Echocardiogram and carotid artery Doppler pending.  Patient's blood 

pressure medications have been adjusted by cardiology today. The treatment plan 

was discussed in detail with his wife at bedside.overall prognosis is guarded 

due to multiple chronic medical conditions.  Cardiology and neurology following 

closely.

## 2021-04-24 NOTE — US
EXAMINATION TYPE: US carotid duplex BILAT

 

DATE OF EXAM: 4/24/2021

 

COMPARISON: 10/11/2019

 

CLINICAL HISTORY: re: AMS, syncope.

 

Exam done portable.

 

EXAM MEASUREMENTS: 

 

RIGHT:  Peak Systolic Velocity (PSV) cm/sec

----- Right CCA:  54.4  

----- Right ICA:  87.2     

----- Right ECA:  84.5   

ICA/CCA ratio:  1.6    

 

RIGHT:  End Diastole cm/sec

----- Right CCA:  15.2   

----- Right ICA:  30.8      

----- Right ECA:  7.1     

 

LEFT:  Peak Systolic Velocity (PSV) cm/sec

----- Left CCA:  47.5  

----- Left ICA:  88.2   

----- Left ECA:  66.7  

ICA/CCA ratio:  1.9  

 

LEFT:  End Diastole cm/sec

----- Left CCA:  16.4  

----- Left ICA:  37.9   

----- Left ECA:  5.1 

 

VERTEBRALS (direction of flow):

Right Vertebral: Antegrade

Left Vertebral: Antegrade

 

Rhythm:  Arrhythmia

 

No elevated velocities, no significant stenosis.

 

 

 

IMPRESSION:

 

There is antegrade flow in the vertebral arteries. The images and measurements suggest less than 25% 
stenosis in both internal carotid arteries.

Criteria for Assigning % of Stenosis / Diameter reduction

(Estimation based on the indirect measurements of the internal carotid artery velocities (ICA PSV).

1.  Normal (no stenosis)=ICA PSV < 125 cm/s: ratio < 2.0: ICA EDV<40 cm/s.

2. Less than 50% stenosis=ICA PSV < 125 cm/s: ratio < 2.0: ICA EDV<40 cm/s.

3.  50 to 69% stenosis=ICA PSV of 125 to 230 cm/s: ration 2.0 ? 4.0: ICA EDV  cm/s.

4.  Greater than 70% stenosis to near occlusion= ICA PSV > 230 cm/s: ratio > 4.0: ICA EDV > 100 cm/s.
 

5.  Near occlusion= ICA PSV velocities may be low or undetectable: variable ratio and ICA EDV.

6.  Total occlusion=unable to detect flow.

## 2021-04-24 NOTE — P.PN
Subjective


Progress Note Date: 04/24/21





Patient was seen for a follow-up via Teleneurology.  Patient's wife was also 

present.  Patient states he is doing slightly better although patient's wife 

believes he is about the same.  Does not appear to have anymore episodes of 

hypotension.  Patient offers no new complaints.





Objective





- Vital Signs


Vital signs: 


                                   Vital Signs











Temp  98.6 F   04/24/21 08:00


 


Pulse  74   04/24/21 08:00


 


Resp  16   04/24/21 08:00


 


BP  119/59   04/24/21 08:00


 


Pulse Ox  100   04/24/21 08:00








                                 Intake & Output











 04/23/21 04/24/21 04/24/21





 18:59 06:59 18:59


 


Intake Total 0  0


 


Balance 0  0


 


Weight  107.5 kg 107.5 kg


 


Intake:   


 


  Oral 0  0


 


Other:   


 


  Voiding Method   Diaper


 


  # Voids  1 1














- Exam





On examination patient is alert and awake, slightly slowly latency time.  Speech

and language functions are normal.  Patient knows he is in [facility] 

and that it is April and the year is 201.  He appears slightly shaky.  Cranial 

nerves are normal.  Muscle strength is normal.  No ataxia.





- Labs


CBC & Chem 7: 


                                 04/24/21 08:32





                                 04/24/21 08:32


Labs: 


                  Abnormal Lab Results - Last 24 Hours (Table)











  04/23/21 04/24/21 04/24/21 Range/Units





  17:05 08:32 08:32 


 


RBC    3.79 L  (4.30-5.90)  m/uL


 


Hgb    12.6 L  (13.0-17.5)  gm/dL


 


MCV    107.7 H  (80.0-100.0)  fL


 


MCHC    30.8 L  (31.0-37.0)  g/dL


 


Macrocytosis    Marked A  


 


PT   19.0 H   (9.0-12.0)  sec


 


INR   1.9 H   (<1.2)  


 


Carbon Dioxide     (22-30)  mmol/L


 


BUN     (9-20)  mg/dL


 


Creatinine     (0.66-1.25)  mg/dL


 


Glucose     (74-99)  mg/dL


 


POC Glucose (mg/dL)  100 H    (75-99)  mg/dL


 


Calcium     (8.4-10.2)  mg/dL














  04/24/21 04/24/21 Range/Units





  08:32 11:38 


 


RBC    (4.30-5.90)  m/uL


 


Hgb    (13.0-17.5)  gm/dL


 


MCV    (80.0-100.0)  fL


 


MCHC    (31.0-37.0)  g/dL


 


Macrocytosis    


 


PT    (9.0-12.0)  sec


 


INR    (<1.2)  


 


Carbon Dioxide  32 H   (22-30)  mmol/L


 


BUN  67 H   (9-20)  mg/dL


 


Creatinine  1.30 H   (0.66-1.25)  mg/dL


 


Glucose  72 L   (74-99)  mg/dL


 


POC Glucose (mg/dL)   63 L  (75-99)  mg/dL


 


Calcium  8.2 L   (8.4-10.2)  mg/dL














Assessment and Plan


Assessment: 





* Episode of unresponsiveness, unclear etiology.  Probably due to hypotension.  

  Patient on multiple cardiac medications, and his blood pressures has been 

  running low in systolic 94-96 range at the time of symptoms.  No evidence of 

  arrhythmia on telemetry monitoring.  EEG very unlikely with negative EEG.  

  Patient does have a defibrillator placed, and the battery changed recently.


* Mild cognitive impairment.


* Diabetes


* Hypertension


* Hyperlipidemia


* Coronary artery disease


Plan: 





* Await cardiology evaluation.


* EEG was performed today, which was abnormal due to background slowing of mild 

  to moderate degree.  This is suggestive of generalized cerebral dysfunction, 

  as can be seen with toxic metabolic encephalopathy or due to diffuse 

  structural brain abnormality.  No epileptiform activity was seen.  EKG channel

  showed arrhythmia.  Clinical correlation recommended.


* Suggest cardiology consultation to rule out arrhythmia and also for elevated 

  cardiac enzymes.  Patient also on 5 different blood pressure medications, and 

  his blood pressure was low at the scene 90/60.  May need to adjust blood 

  pressure medications.


* Await Carotid Doppler.

## 2021-04-24 NOTE — XR
EXAMINATION TYPE: XR chest 1V

 

DATE OF EXAM: 4/24/2021

 

COMPARISON: 4/18/2021

 

HISTORY: Short of breath

 

TECHNIQUE: Single view

 

FINDINGS: Heart is enlarged. There is no gross heart failure. There are no hilar masses. There is lef
t axillary pacemaker. I see no definite pleural effusion.

 

IMPRESSION: Cardiomegaly. There is clearing of the pulmonary congestion compared to recent exam. No o
bvious heart failure.

## 2021-04-24 NOTE — P.CRDCN
History of Present Illness


History of present illness: 





HISTORY OF PRESENTING ILLNESS


Patient is a 76-year-old male with a history of ischemic cardiomyopathy status 

post AICD, diabetes mellitus type 2, hypertension, hyperlipidemia, GERD, 

Parkinson's, previous Guillain-Barr syndrome, atrial fibrillation on 

anticoagulation with Coumadin who presented initially 4/15 due to AICD at end of

battery life and for upgrade given cardiomyopathy with widened QRS.  Patient 

underwent successful upgrade to biventricular AICD and also underwent cardiovers

ion for his atrial fibrillation with Coumadin level, INR therapeutic.  Patient 

had some confusion with sedation and therefore was kept for monitoring and then 

discharged to subacute rehab.  Prior to this patient had been fairly functional 

able to walk with a walker and lives with his wife at home.  Unfortunately 

patient had episode where he was found slumped over at subacute rehab and 

unresponsive.  Per note, EMS found initial blood pressure 90/60 with a pulse 

rate of 82, oxygen saturation 97% and blood sugar 126.  Patient had CT head 

performed which showed age-related atrophic and chronic small vessel changes 

without acute intracranial process.  Patient's troponins are noted to be mildly 

elevated at 0.41, 0.28, 0.257.  His creatinine was initially mildly elevated up 

to 1.8, currently improved to 1.3.  His coronavirus has been negative.  Per wife

this is not like himself and he has been confused for the last approximately 

week.  There are no significant changes in medications.  Neurology is concerned 

with possible low blood pressures however MAP has been in the 70s to 80s, lowest

blood pressure reading 94/60.  Patient apparently had another episode of 

unresponsiveness while in the hospital yesterday and he had been talking 

normally and then became slumped over in a sleep however blood pressure at that 

time was 90s over 60s.





REVIEW OF SYSTEMS


At the time of my exam:


CONSTITUTIONAL: Denies fever or chills.


CARDIOVASCULAR: Denies chest pain, shortness of breath, orthopnea, PND or 

palpitations.


RESPIRATORY: Denies cough. 


GASTROINTESTINAL: Denies abdominal pain, diarrhea, constipation, nausea or 

vomiting.


MUSCULOSKELETAL: Denies myalgias.


NEUROLOGIC: Denies numbness, tingling or weakness.


ENDOCRINE: Denies fatigue, weight change,  polydipsia or polyurina.


GENITOURINARY: Denies burning, hematuria or urgency with micturation.


HEMATOLOGIC: Denies history of anemia or bleeding. 





PHYSICAL EXAMINATION


Vital signs reviewed.


CONSTITUTIONAL: No apparent distress, confused, alert and oriented to self and 

hospital however not date


HEENT: Head is normocephalic. Pupils are equal, round. Sclerae anicteric. Mucous

membranes of the mouth are moist.  No JVD. No carotid bruit.


CHEST EXAMINATION: Lungs are clear to auscultation. No chest wall tenderness is 

noted on palpation or with deep breathing. 


HEART EXAMINATION: Regular rate and rhythm. S1, S2 heard. No murmurs, gallops or

rub.


ABDOMEN: Soft, nontender. Positive bowel sounds.


EXTREMITIES: 2+ peripheral pulses, no lower extremity edema and no calf 

tenderness.


NEUROLOGIC EXAMINATION: Patient is awake, confused





ASSESSMENT


1.  Altered mental status


2.  Apparent syncope 2, being found slumped over and unresponsive both the ECF 

and in-hospital apparently with blood pressures in the 90s over 60s


3.  Status post biventricular AICD upgrade 4/15/2021


4.  Ischemic cardiomyopathy


5.  Essential hypertension


6.  Persistent atrial fibrillation status post electrical cardioversion 

4/15/2021


7.  History of Parkinson's


8.  Diabetes mellitus type 2


9.  Acute kidney injury, improving


10.  Mildly elevated troponins, suspect type II etiology from syncope with known

history of coronary artery disease, ischemic cardiomyopathy





PLAN


Patient appears to have 2 different issues with altered mental status over the 

past week.  Neurology recommendations appreciated.  Rule out stroke versus 

encephalopathy versus other.  





Other issue appears to be syncopal episodes, patient being found slumped over 

and unresponsive.  One of these episodes occurred while patient in monitored on 

telemetry and no acute arrhythmias noted.  Concern by neurology of decreased 

blood pressure however blood pressure of 90s over 60s and should not cause 

hypoperfusion of the brain at that level without significant carotid stenosis.  

Although mild permissive hypertension may be allowed, given his heart failure, 

cardiomyopathy we do not want patient to hypertensive.  We will decrease 

lisinopril and monitor response.  Check carotid ultrasound.  Check AICD 

interrogation.  Check 2-D echo.  Further recommendations to follow.











Past Medical History


Past Medical History: Diabetes Mellitus, Eye Disorder, GERD/Reflux, 

Hyperlipidemia, Hypertension, Memory Impairment, Myocardial Infarction (MI), 

Prostate Disorder, Thyroid Disorder


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 

WITH HALLUCINATIONS.  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

and now resolved, SEE DR CHINCHILLA'S HISTORY AND PHYSICAL FOR CARDIAC HISTORY, 

CATARACT RIGHT EYE.  lewy body dementia with parkinsons recently increase upper 

body movement especially at night


Last Myocardial Infarction Date:: 1993


History of Any Multi-Drug Resistant Organisms: None Reported


Past Surgical History: AICD, Heart Catheterization With Stent, Pacemaker


Additional Past Surgical History / Comment(s): SEVERAL CARDIOVERSIONS.  LT 

CATARACT REMOVED


Past Anesthesia/Blood Transfusion Reactions: No Reported Reaction


Date of Last Stent Placement:: 1993


Type of Cardiac Device: Permanent Pacemaker, AICD


Device Placement Date:: 1994, 11/2010


Past Psychological History: Anxiety


Smoking Status: Former smoker


Past Alcohol Use History: Rare


Past Drug Use History: None Reported





- Past Family History


  ** Mother


Additional Family Medical History / Comment(s): SEVERE LEG EDEMA





Medications and Allergies


                                Home Medications











 Medication  Instructions  Recorded  Confirmed  Type


 


Atorvastatin [Lipitor] 20 mg PO HS 04/09/18 04/22/21 History


 


Famotidine [Pepcid] 20 mg PO DAILY@0600 04/09/18 04/22/21 History


 


Finasteride [Proscar] 5 mg PO HS 04/09/18 04/22/21 History


 


Furosemide [Lasix] 40 mg PO DAILY@0600 04/09/18 04/22/21 History


 


allopurinoL [Zyloprim] 300 mg PO HS 04/09/18 04/22/21 History


 


glipiZIDE [Glucotrol] 2.5 mg PO BID 04/09/18 04/22/21 History


 


Warfarin [Coumadin] 2.5 mg PO DAILY@1600 11/15/18 04/22/21 History


 


QUEtiapine [SEROquel] 50 mg PO HS 03/31/21 04/22/21 History


 


rOPINIRole HCL [Requip] 0.25 mg PO HS 04/01/21 04/22/21 History


 


Benazepril HCl 20 mg PO HS@2000 04/12/21 04/22/21 History


 


Levothyroxine Sodium 25 mcg PO HS 04/12/21 04/22/21 History


 


bisacodyL [Dulcolax] 5 mg PO DAILY PRN 04/12/21 04/22/21 History


 


Metoprolol Succinate (ER) [Toprol 100 mg PO BID@0800,2000 04/18/21 04/22/21 

History





XL]    


 


Isosorbide Mononitrate ER [Imdur] 30 mg PO DAILY@0600 04/22/21 04/22/21 History


 


Multivitamins, Thera [Multivitamin 1 tab PO DAILY@0600 04/22/21 04/22/21 History





(formulary)]    


 


Spironolactone [Aldactone] 25 mg PO DAILY@0600 04/22/21 04/22/21 History








                                    Allergies











Allergy/AdvReac Type Severity Reaction Status Date / Time


 


No Known Allergies Allergy   Verified 04/22/21 10:36














Physical Exam


Vitals: 


                                   Vital Signs











  Temp Pulse Resp BP Pulse Ox


 


 04/24/21 16:00  97.4 F L  80  16  115/70  99


 


 04/24/21 14:00   74  16  


 


 04/24/21 12:00  97.6 F  73  18  119/70  95


 


 04/24/21 08:00  98.6 F  74  16  119/59  100


 


 04/24/21 04:00   72  16  119/64  97


 


 04/24/21 00:00   71  16  105/60  97


 


 04/23/21 20:00  97.5 F L  77  16  108/61  99








                                Intake and Output











 04/24/21 04/24/21 04/24/21





 06:59 14:59 22:59


 


Intake Total  150 500


 


Balance  150 500


 


Intake:   


 


  Oral  150 500


 


Other:   


 


  Voiding Method  Diaper 


 


  # Voids 1 1 1


 


  Weight 107.5 kg 107.5 kg 














Results





                                 04/24/21 08:32





                                 04/24/21 08:32


                                   Coagulation











  04/24/21 Range/Units





  08:32 


 


PT  19.0 H  (9.0-12.0)  sec








                                       CBC











  04/24/21 Range/Units





  08:32 


 


WBC  7.4  (3.8-10.6)  k/uL


 


RBC  3.79 L  (4.30-5.90)  m/uL


 


Hgb  12.6 L  (13.0-17.5)  gm/dL


 


Hct  40.8  (39.0-53.0)  %


 


Plt Count  176  (150-450)  k/uL








                          Comprehensive Metabolic Panel











  04/24/21 Range/Units





  08:32 


 


Sodium  144  (137-145)  mmol/L


 


Potassium  3.6  (3.5-5.1)  mmol/L


 


Chloride  107  ()  mmol/L


 


Carbon Dioxide  32 H  (22-30)  mmol/L


 


BUN  67 H  (9-20)  mg/dL


 


Creatinine  1.30 H  (0.66-1.25)  mg/dL


 


Glucose  72 L  (74-99)  mg/dL


 


Calcium  8.2 L  (8.4-10.2)  mg/dL








                               Current Medications











Generic Name Dose Route Start Last Admin





  Trade Name Freq  PRN Reason Stop Dose Admin


 


Acetaminophen  1,000 mg  04/24/21 12:45  04/24/21 12:50





  Acetaminophen Tab 500 Mg Tab  PO   1,000 mg





  Q6HR PRN   Administration





  Fever and/ or Pain  


 


Allopurinol  300 mg  04/23/21 21:00  04/23/21 20:51





  Allopurinol 300 Mg Tab  PO   300 mg





  [facility]   Administration


 


Atorvastatin Calcium  20 mg  04/23/21 21:00  04/23/21 20:51





  Atorvastatin 20 Mg Tab  PO   20 mg





  HS [facility]   Administration


 


Bisacodyl  5 mg  04/23/21 07:49 





  Bisacodyl 5 Mg Tablet.Dr  PO  





  DAILY PRN  





  Constipation  


 


Famotidine  20 mg  04/24/21 09:00  04/24/21 09:56





  Famotidine 20 Mg Tab  PO   20 mg





  DAILY@0900 [facility]   Administration


 


Finasteride  5 mg  04/23/21 21:00  04/23/21 20:51





  Finasteride 5 Mg Tab  PO   5 mg





  HS [facility]   Administration


 


Furosemide  40 mg  04/24/21 09:00  04/24/21 09:56





  Furosemide 40 Mg Tab  PO   40 mg





  DAILY@0900 [facility]   Administration


 


Glipizide  2.5 mg  04/23/21 09:00  04/24/21 11:28





  Glipizide 2.5 Mg Tab  PO   Not Given





  BID [facility]  


 


Isosorbide Mononitrate  30 mg  04/24/21 09:00  04/24/21 09:56





  Isosorbide Mononitrate Er 30 Mg Tab.Er.24h  PO   30 mg





  DAILY@0900 [facility]   Administration


 


Levothyroxine Sodium  25 mcg  04/23/21 21:00  04/23/21 20:51





  Levothyroxine 25 Mcg Tab  PO   25 mcg





  HS [facility]   Administration


 


Lisinopril  20 mg  04/23/21 20:00  04/23/21 20:45





  Lisinopril 20 Mg Tab  PO   Not Given





  HS@2000 [facility]  


 


Metoprolol Succinate  100 mg  04/23/21 08:00  04/24/21 09:56





  Metoprolol Succinate (Er) 100 Mg Tab.Er.24h  PO   100 mg





  BID@0800,2000 [facility]   Administration


 


Miscellaneous Information  0 each  04/23/21 08:02 





  Warfarin Per Pharmacy  MISCELLANE  





  AS DIRECTED PRN  





  per protocol  


 


Multivitamins  1 each  04/24/21 09:00  04/24/21 09:56





  Multivitamins, Thera 1 Each Tab  PO   1 each





  DAILY@0900 [facility]   Administration


 


Naloxone HCl  0.2 mg  04/22/21 12:43 





  Naloxone 0.4 Mg/Ml 1 Ml Vial  IV  





  Q2M PRN  





  Opioid Reversal  


 


Quetiapine Fumarate  50 mg  04/23/21 21:00  04/23/21 20:51





  Quetiapine 50 Mg Tab  PO   50 mg





  [facility]   Administration


 


Ropinirole HCl  0.25 mg  04/23/21 21:00  04/23/21 20:51





  Ropinirole Hcl 0.25 Mg Tab  PO   0.25 mg





  [facility]   Administration


 


Silver Sulfadiazine  1 applic  04/24/21 16:15 





  Silver Sulfadiazine 1% Cream 25 Gm Tube  TOPICAL  





  DAILY [facility]ON  


 


Spironolactone  25 mg  04/24/21 09:00  04/24/21 09:56





  Spironolactone 25 Mg Tab  PO   25 mg





  DAILY@0900 [facility]   Administration


 


Warfarin Sodium  2.5 mg  04/23/21 16:00  04/23/21 20:51





  Warfarin 2.5 Mg Tab  PO   2.5 mg





  DAILY@1600 [facility]   Administration





  Protocol  








                                Intake and Output











 04/24/21 04/24/21 04/24/21





 06:59 14:59 22:59


 


Intake Total  150 500


 


Balance  150 500


 


Intake:   


 


  Oral  150 500


 


Other:   


 


  Voiding Method  Diaper 


 


  # Voids 1 1 1


 


  Weight 107.5 kg 107.5 kg 








                                 Patient Weight











 04/25/21





 06:59


 


Weight 107.5 kg








                                        





                                 04/24/21 08:32 





                                 04/24/21 08:32

## 2021-04-25 VITALS — RESPIRATION RATE: 18 BRPM

## 2021-04-25 LAB
ALBUMIN SERPL-MCNC: 2.5 G/DL (ref 3.5–5)
ALP SERPL-CCNC: 73 U/L (ref 38–126)
ALT SERPL-CCNC: 6 U/L (ref 4–49)
ANION GAP SERPL CALC-SCNC: 2 MMOL/L
AST SERPL-CCNC: 22 U/L (ref 17–59)
BASOPHILS # BLD AUTO: 0 K/UL (ref 0–0.2)
BASOPHILS NFR BLD AUTO: 0 %
BUN SERPL-SCNC: 55 MG/DL (ref 9–20)
CALCIUM SPEC-MCNC: 8.3 MG/DL (ref 8.4–10.2)
CHLORIDE SERPL-SCNC: 109 MMOL/L (ref 98–107)
CO2 SERPL-SCNC: 33 MMOL/L (ref 22–30)
EOSINOPHIL # BLD AUTO: 0.2 K/UL (ref 0–0.7)
EOSINOPHIL NFR BLD AUTO: 3 %
ERYTHROCYTE [DISTWIDTH] IN BLOOD BY AUTOMATED COUNT: 3.58 M/UL (ref 4.3–5.9)
ERYTHROCYTE [DISTWIDTH] IN BLOOD: 15.2 % (ref 11.5–15.5)
GLUCOSE BLD-MCNC: 111 MG/DL (ref 75–99)
GLUCOSE BLD-MCNC: 136 MG/DL (ref 75–99)
GLUCOSE BLD-MCNC: 86 MG/DL (ref 75–99)
GLUCOSE BLD-MCNC: 98 MG/DL (ref 75–99)
GLUCOSE SERPL-MCNC: 104 MG/DL (ref 74–99)
HCT VFR BLD AUTO: 38.1 % (ref 39–53)
HGB BLD-MCNC: 12.3 GM/DL (ref 13–17.5)
INR PPP: 2.3 (ref ?–1.2)
LYMPHOCYTES # SPEC AUTO: 1.6 K/UL (ref 1–4.8)
LYMPHOCYTES NFR SPEC AUTO: 23 %
MCH RBC QN AUTO: 34.3 PG (ref 25–35)
MCHC RBC AUTO-ENTMCNC: 32.2 G/DL (ref 31–37)
MCV RBC AUTO: 106.6 FL (ref 80–100)
MONOCYTES # BLD AUTO: 0.7 K/UL (ref 0–1)
MONOCYTES NFR BLD AUTO: 10 %
NEUTROPHILS # BLD AUTO: 4.2 K/UL (ref 1.3–7.7)
NEUTROPHILS NFR BLD AUTO: 60 %
PLATELET # BLD AUTO: 197 K/UL (ref 150–450)
POTASSIUM SERPL-SCNC: 3.4 MMOL/L (ref 3.5–5.1)
PROT SERPL-MCNC: 4.9 G/DL (ref 6.3–8.2)
PT BLD: 22.4 SEC (ref 9–12)
SODIUM SERPL-SCNC: 144 MMOL/L (ref 137–145)
WBC # BLD AUTO: 6.9 K/UL (ref 3.8–10.6)

## 2021-04-25 RX ADMIN — THERA TABS SCH EACH: TAB at 10:00

## 2021-04-25 RX ADMIN — FUROSEMIDE SCH MG: 40 TABLET ORAL at 10:00

## 2021-04-25 RX ADMIN — ACETAMINOPHEN PRN MG: 500 TABLET ORAL at 17:19

## 2021-04-25 RX ADMIN — SPIRONOLACTONE SCH MG: 25 TABLET, FILM COATED ORAL at 10:00

## 2021-04-25 RX ADMIN — ACETAMINOPHEN PRN MG: 500 TABLET ORAL at 10:06

## 2021-04-25 RX ADMIN — FINASTERIDE SCH MG: 5 TABLET, FILM COATED ORAL at 20:18

## 2021-04-25 RX ADMIN — FAMOTIDINE SCH MG: 20 TABLET, FILM COATED ORAL at 10:00

## 2021-04-25 RX ADMIN — WARFARIN SODIUM SCH MG: 2.5 TABLET ORAL at 17:19

## 2021-04-25 RX ADMIN — POTASSIUM CHLORIDE SCH MEQ: 20 TABLET, EXTENDED RELEASE ORAL at 20:19

## 2021-04-25 RX ADMIN — POTASSIUM CHLORIDE SCH MEQ: 20 TABLET, EXTENDED RELEASE ORAL at 21:00

## 2021-04-25 RX ADMIN — SILVER SULFADIAZINE SCH APPLIC: 10 CREAM TOPICAL at 10:03

## 2021-04-25 RX ADMIN — ATORVASTATIN CALCIUM SCH MG: 20 TABLET, FILM COATED ORAL at 20:18

## 2021-04-25 RX ADMIN — METOPROLOL SUCCINATE SCH MG: 100 TABLET, EXTENDED RELEASE ORAL at 10:00

## 2021-04-25 RX ADMIN — LEVOTHYROXINE SODIUM SCH MCG: 25 TABLET ORAL at 20:19

## 2021-04-25 RX ADMIN — METOPROLOL SUCCINATE SCH MG: 100 TABLET, EXTENDED RELEASE ORAL at 20:18

## 2021-04-25 RX ADMIN — ISOSORBIDE MONONITRATE SCH MG: 30 TABLET, EXTENDED RELEASE ORAL at 10:00

## 2021-04-25 NOTE — P.PN
Subjective





HISTORY OF PRESENTING ILLNESS


Patient is a 76-year-old male with a history of ischemic cardiomyopathy status 

post AICD, diabetes mellitus type 2, hypertension, hyperlipidemia, GERD, 

Parkinson's, previous Guillain-Barr syndrome, atrial fibrillation on 

anticoagulation with Coumadin who presented initially 4/15 due to AICD at end of

battery life and for upgrade given cardiomyopathy with widened QRS.  Patient 

underwent successful upgrade to biventricular AICD and also underwent 

cardioversion for his atrial fibrillation with Coumadin level, INR therapeutic. 

Patient had some confusion with sedation and therefore was kept for monitoring 

and then discharged to subacute rehab.  Prior to this patient had been fairly 

functional able to walk with a walker and lives with his wife at home.  

Unfortunately patient had episode where he was found slumped over at subacute 

rehab and unresponsive.  Per note, EMS found initial blood pressure 90/60 with a

pulse rate of 82, oxygen saturation 97% and blood sugar 126.  Patient had CT 

head performed which showed age-related atrophic and chronic small vessel 

changes without acute intracranial process.  Patient's troponins are noted to be

mildly elevated at 0.41, 0.28, 0.257.  His creatinine was initially mildly 

elevated up to 1.8, currently improved to 1.3.  His coronavirus has been 

negative.  Per wife this is not like himself and he has been confused for the 

last approximately week.  There are no significant changes in medications.  

Neurology is concerned with possible low blood pressures however MAP has been in

the 70s to 80s, lowest blood pressure reading 94/60.  Patient apparently had 

another episode of unresponsiveness while in the hospital yesterday and he had 

been talking normally and then became slumped over in a sleep however blood 

pressure at that time was 90s over 60s.





4/25


Patient seen and examined.  Lisinopril was decreased and blood pressure per 

dominantly control however one blood pressure 96/55.  Patient somewhat 

somnolent.  Neurology is going to decrease his Seroquel.  Denies any chest pain 

or pressure.





REVIEW OF SYSTEMS


At the time of my exam:


CONSTITUTIONAL: Denies fever or chills.


CARDIOVASCULAR: Denies chest pain, shortness of breath, orthopnea, PND or 

palpitations.


RESPIRATORY: Denies cough. 


GASTROINTESTINAL: Denies abdominal pain, diarrhea, constipation, nausea or 

vomiting.


MUSCULOSKELETAL: Denies myalgias.


NEUROLOGIC: Denies numbness, tingling or weakness.


ENDOCRINE: Denies fatigue, weight change,  polydipsia or polyurina.


GENITOURINARY: Denies burning, hematuria or urgency with micturation.


HEMATOLOGIC: Denies history of anemia or bleeding. 





PHYSICAL EXAMINATION


Vital signs reviewed.


CONSTITUTIONAL: No apparent distress, confused, alert and oriented to self and 

hospital however not date


HEENT: Head is normocephalic. Pupils are equal, round. Sclerae anicteric. Mucous

membranes of the mouth are moist.  No JVD. No carotid bruit.


CHEST EXAMINATION: Lungs are clear to auscultation. No chest wall tenderness is 

noted on palpation or with deep breathing. 


HEART EXAMINATION: Regular rate and rhythm. S1, S2 heard. No murmurs, gallops or

rub.


ABDOMEN: Soft, nontender. Positive bowel sounds.


EXTREMITIES: 2+ peripheral pulses, no lower extremity edema and no calf 

tenderness.


NEUROLOGIC EXAMINATION: Patient is awake, confused





ASSESSMENT


1.  Altered mental status


2.  Apparent syncope 2, being found slumped over and unresponsive both the ECF 

and in-hospital apparently with blood pressures in the 90s over 60s


3.  Status post biventricular AICD upgrade 4/15/2021


4.  Ischemic cardiomyopathy


5.  Essential hypertension


6.  Persistent atrial fibrillation status post electrical cardioversion 

4/15/2021


7.  History of Parkinson's


8.  Diabetes mellitus type 2


9.  Acute kidney injury, improving


10.  Mildly elevated troponins, suspect type II etiology from syncope with known

history of coronary artery disease, ischemic cardiomyopathy





PLAN


Patient appears to have 2 different issues with altered mental status over the 

past week.  Neurology recommendations appreciated.  Rule out stroke versus 

encephalopathy versus other.  





Other issue appears to be questionable syncopal episodes, patient being found 

slumped over and unresponsive.  One of these episodes occurred while patient in 

monitored on telemetry and no acute arrhythmias noted.  Allowing mild permissive

hypertension however would not allow too much with his history of heart failure.

 We will check echocardiogram to evaluate for any structural issues status post 

AICD upgrade.  Carotid ultrasound was checked to rule out bilateral stenosis 

that could be causing hypoperfusion however only with mild disease.  We will 

again decrease lisinopril from 10 mg to 5 mg however do not feel he is having 

hypoperfusion with blood pressures 90s over 60s.











Objective





- Vital Signs


Vital signs: 


                                   Vital Signs











Temp  98.6 F   04/25/21 15:10


 


Pulse  67   04/25/21 15:10


 


Resp  18   04/25/21 15:10


 


BP  93/52   04/25/21 15:10


 


Pulse Ox  97   04/25/21 15:10








                                 Intake & Output











 04/24/21 04/25/21 04/25/21





 18:59 06:59 18:59


 


Intake Total 750  0


 


Output Total   1


 


Balance 750  -1


 


Weight 107.5 kg 111.1 kg 


 


Intake:   


 


  Oral 750  0


 


Output:   


 


  Urine   1


 


Other:   


 


  Voiding Method Diaper Diaper Diaper


 


  # Voids 1 1 1














- Labs


CBC & Chem 7: 


                                 04/25/21 08:36





                                 04/25/21 08:36


Labs: 


                  Abnormal Lab Results - Last 24 Hours (Table)











  04/24/21 04/24/21 04/25/21 Range/Units





  16:40 19:44 08:36 


 


RBC     (4.30-5.90)  m/uL


 


Hgb     (13.0-17.5)  gm/dL


 


Hct     (39.0-53.0)  %


 


MCV     (80.0-100.0)  fL


 


PT    22.4 H  (9.0-12.0)  sec


 


INR    2.3 H  (<1.2)  


 


Potassium     (3.5-5.1)  mmol/L


 


Chloride     ()  mmol/L


 


Carbon Dioxide     (22-30)  mmol/L


 


BUN     (9-20)  mg/dL


 


Glucose     (74-99)  mg/dL


 


POC Glucose (mg/dL)  114 H  110 H   (75-99)  mg/dL


 


Calcium     (8.4-10.2)  mg/dL


 


Total Protein     (6.3-8.2)  g/dL


 


Albumin     (3.5-5.0)  g/dL














  04/25/21 04/25/21 Range/Units





  08:36 08:36 


 


RBC  3.58 L   (4.30-5.90)  m/uL


 


Hgb  12.3 L   (13.0-17.5)  gm/dL


 


Hct  38.1 L   (39.0-53.0)  %


 


MCV  106.6 H   (80.0-100.0)  fL


 


PT    (9.0-12.0)  sec


 


INR    (<1.2)  


 


Potassium   3.4 L  (3.5-5.1)  mmol/L


 


Chloride   109 H  ()  mmol/L


 


Carbon Dioxide   33 H  (22-30)  mmol/L


 


BUN   55 H  (9-20)  mg/dL


 


Glucose   104 H  (74-99)  mg/dL


 


POC Glucose (mg/dL)    (75-99)  mg/dL


 


Calcium   8.3 L  (8.4-10.2)  mg/dL


 


Total Protein   4.9 L  (6.3-8.2)  g/dL


 


Albumin   2.5 L  (3.5-5.0)  g/dL

## 2021-04-25 NOTE — P.PN
Subjective


Progress Note Date: 04/25/21


Principal diagnosis: 


Encephalopathy





covering for Dr. Villalta over the weekend





Mr. Motley is a 76-year-old gentleman with a past medical history of 

hypertension, diabetes mellitus, hyperlipidemia,  Lewy body dementiabrought in 

for altered mental status changes.  Patient recently had successful upgrade to 

biventricular AICD and underwent cardioversion.  After the procedure patient had

some confusion so was monitored for a few hours and later on discharged to 

subacute rehab.  At the subacute rehab patient was found slumped over and 

unresponsive and when they checked his initial blood pressure it was found to be

90 x 60.  Patient had a computed tomography scan in the ER showing age-related 

atrophic changes and chronic small vessel changes without acute intracranial 

process.  His troponins were mildly elevated at 0.41, 0.28, 0.57.  At the time 

of admission his creatinine was elevated at 1.5 and currently it is at 1.3.  As 

per discussion with nursing staff he had another episode of unresponsiveness 

yesterday evening. Neurology has evaluated the patient yesterday and he had an 

EEG doneshowing abnormal due to background slowing of mild-to-moderate degree 

suggestive of generalized cerebral dysfunction.today on examination patient was 

comfortably lying in bed appears to be in no acute distress.  His wife is at 

bedside.  She mentions that she is confused at times.  Patient denies having any

chest pain or difficulty in breathing.  She denies having any weakness of his 

extremities.  He was able to tell me that he had the procedure done last week 

and later was sent to subacute rehab, he was not sure what happened there and 

eventually admitted to the hospital.








On 4/25/2020 -patient was seen and examined at the bedside.  His wife is at the 

bedside.  Patient has been more lethargic compared to yesterday.  He was 

sleeping all through the morning as per discussion with nursing staff.  Patient 

woke up on calling his name was slightly confused but said that he is feeling 

sleepy.  He denies having any chest pain or palpitations no cough or difficulty 

in breathing.  On reviewing the vitals patient's temperature 98.6, heart rate 

67, respiratory rate 16 blood pressure 93/52, saturating at 97% on 2 L of nasal 

cannula.  On reviewing the patient's labs from this morning sodium 144, 

potassium 3.4, chloride 109, bicarb 33, BUN 35, creatinine 1.08 albumin of 2.5. 

CBC showing white count of 6.9, hemoglobin 12.3, platelets 197.


Active Medications





Acetaminophen (Acetaminophen Tab 500 Mg Tab)  1,000 mg PO Q6HR PRN


   PRN Reason: Fever and/ or Pain


   Last Admin: 04/25/21 17:19 Dose:  1,000 mg


   Documented by: 


Allopurinol (Allopurinol 300 Mg Tab)  300 mg PO [facility]


   Last Admin: 04/25/21 20:18 Dose:  300 mg


   Documented by: 


Atorvastatin Calcium (Atorvastatin 20 Mg Tab)  20 mg PO [facility]


   Last Admin: 04/25/21 20:18 Dose:  20 mg


   Documented by: 


Bisacodyl (Bisacodyl 5 Mg Tablet.Dr)  5 mg PO DAILY PRN


   PRN Reason: Constipation


Famotidine (Famotidine 20 Mg Tab)  20 mg PO DAILY@0900 [facility]


   Last Admin: 04/25/21 10:00 Dose:  20 mg


   Documented by: 


Finasteride (Finasteride 5 Mg Tab)  5 mg PO [facility]


   Last Admin: 04/25/21 20:18 Dose:  5 mg


   Documented by: 


Furosemide (Furosemide 40 Mg Tab)  40 mg PO DAILY@0900 [facility]


   Last Admin: 04/25/21 10:00 Dose:  40 mg


   Documented by: 


Glipizide (Glipizide 2.5 Mg Tab)  2.5 mg PO BID [facility]


   Last Admin: 04/25/21 20:19 Dose:  2.5 mg


   Documented by: 


Isosorbide Mononitrate (Isosorbide Mononitrate Er 30 Mg Tab.Er.24h)  30 mg PO 

DAILY@0900 [facility]


   Last Admin: 04/25/21 10:00 Dose:  30 mg


   Documented by: 


Levothyroxine Sodium (Levothyroxine 25 Mcg Tab)  25 mcg PO [facility]


   Last Admin: 04/25/21 20:19 Dose:  25 mcg


   Documented by: 


Lisinopril (Lisinopril 5 Mg Tab)  5 mg PO HS@2000 [facility]


   Last Admin: 04/25/21 20:18 Dose:  5 mg


   Documented by: 


Metoprolol Succinate (Metoprolol Succinate (Er) 100 Mg Tab.Er.24h)  100 mg PO 

BID@0800,2000 [facility]


   Last Admin: 04/25/21 20:18 Dose:  100 mg


   Documented by: 


Miscellaneous Information (Warfarin Per Pharmacy)  0 each MISCELLANE AS DIRECTED

PRN


   PRN Reason: per protocol


Miscellaneous Information (Potassium Replacement Protocol 1 Each Misc)  1 each 

MISCELLANE DAILY PRN; Protocol


   PRN Reason: Per Protocol


Multivitamins (Multivitamins, Thera 1 Each Tab)  1 each PO DAILY@0900 [facility]


   Last Admin: 04/25/21 10:00 Dose:  1 each


   Documented by: 


Naloxone HCl (Naloxone 0.4 Mg/Ml 1 Ml Vial)  0.2 mg IV Q2M PRN


   PRN Reason: Opioid Reversal


Quetiapine Fumarate (Quetiapine 25 Mg Tab)  25 mg PO [facility]


   Last Admin: 04/25/21 21:00 Dose:  25 mg


   Documented by: 


Ropinirole HCl (Ropinirole Hcl 0.25 Mg Tab)  0.25 mg PO [facility]


   Last Admin: 04/25/21 20:20 Dose:  0.25 mg


   Documented by: 


Silver Sulfadiazine (Silver Sulfadiazine Cream 400 Gm 1 Applic Applic)  1 applic

TOPICAL DAILY [facility]


   Last Admin: 04/25/21 10:03 Dose:  1 applic


   Documented by: 


Spironolactone (Spironolactone 25 Mg Tab)  25 mg PO DAILY@0900 [facility]


   Last Admin: 04/25/21 10:00 Dose:  25 mg


   Documented by: 


Warfarin Sodium (Warfarin 2.5 Mg Tab)  2.5 mg PO DAILY@1600 [facility]; Protocol


   Last Admin: 04/25/21 17:19 Dose:  2.5 mg


   Documented by: 











Objective





- Vital Signs


Vital signs: 


                                   Vital Signs











Temp  98.6 F   04/25/21 15:10


 


Pulse  67   04/25/21 15:10


 


Resp  18   04/25/21 15:10


 


BP  93/52   04/25/21 15:10


 


Pulse Ox  97   04/25/21 15:10








                                 Intake & Output











 04/24/21 04/25/21 04/25/21





 18:59 06:59 18:59


 


Intake Total 750  0


 


Output Total   1


 


Balance 750  -1


 


Weight 107.5 kg 111.1 kg 


 


Intake:   


 


  Oral 750  0


 


Output:   


 


  Urine   1


 


Other:   


 


  Voiding Method Diaper Diaper Diaper


 


  # Voids 1 1 1














- Exam





GENERAL: The patient is alert and oriented x 2-3, not in any acute distress. 

More lethargic compared to yesterday. 


HEENT: Pupils are round and equally reacting to light. EOMI. No scleral icterus.

No conjunctival pallor.


CARDIOVASCULAR: S1 and S2 present. No murmurs, rubs, or gallops. 


PULMONARY: Chest is clear to auscultation, decreased breath sounds at the lower 

lung bases.


ABDOMEN: Soft, nontender, nondistended, normoactive bowel sounds. 


MUSCULOSKELETAL: No joint swelling or deformity. 


EXTREMITIES: No cyanosis, clubbing, or pedal edema. 


NEUROLOGICAL: Gross neurological examination did not reveal any focal deficits. 

able to move all his Limbs


SKIN: No rashes. no petechiae.








- Labs


CBC & Chem 7: 


                                 04/25/21 08:36





                                 04/25/21 08:36


Labs: 


                  Abnormal Lab Results - Last 24 Hours (Table)











  04/24/21 04/24/21 04/25/21 Range/Units





  16:40 19:44 08:36 


 


RBC     (4.30-5.90)  m/uL


 


Hgb     (13.0-17.5)  gm/dL


 


Hct     (39.0-53.0)  %


 


MCV     (80.0-100.0)  fL


 


PT    22.4 H  (9.0-12.0)  sec


 


INR    2.3 H  (<1.2)  


 


Potassium     (3.5-5.1)  mmol/L


 


Chloride     ()  mmol/L


 


Carbon Dioxide     (22-30)  mmol/L


 


BUN     (9-20)  mg/dL


 


Glucose     (74-99)  mg/dL


 


POC Glucose (mg/dL)  114 H  110 H   (75-99)  mg/dL


 


Calcium     (8.4-10.2)  mg/dL


 


Total Protein     (6.3-8.2)  g/dL


 


Albumin     (3.5-5.0)  g/dL














  04/25/21 04/25/21 Range/Units





  08:36 08:36 


 


RBC  3.58 L   (4.30-5.90)  m/uL


 


Hgb  12.3 L   (13.0-17.5)  gm/dL


 


Hct  38.1 L   (39.0-53.0)  %


 


MCV  106.6 H   (80.0-100.0)  fL


 


PT    (9.0-12.0)  sec


 


INR    (<1.2)  


 


Potassium   3.4 L  (3.5-5.1)  mmol/L


 


Chloride   109 H  ()  mmol/L


 


Carbon Dioxide   33 H  (22-30)  mmol/L


 


BUN   55 H  (9-20)  mg/dL


 


Glucose   104 H  (74-99)  mg/dL


 


POC Glucose (mg/dL)    (75-99)  mg/dL


 


Calcium   8.3 L  (8.4-10.2)  mg/dL


 


Total Protein   4.9 L  (6.3-8.2)  g/dL


 


Albumin   2.5 L  (3.5-5.0)  g/dL














Assessment and Plan


Assessment: 





ASSESSMENT








Acute encephalopathy


Acute kidney injury


Recentbiventricular AICD upgrade done on 04/15/2021


Ischemic cardiomyopathy


Persistent atrial fibrillation status post cardioversion 04/15/2021


Mildly elevated troponin


Lewy- body Dementia


Hypertension


Coronary artery disease


Hyperlipidemia





: PLAN - patient had episodes of unresponsiveness twice.  Workup in 

progress.Patient had CT of the brain that was showing chronic small vessel 

disease.  EEG showing generalized cerebral dysfunction. at the time of admission

patient had elevated creatinine , given IV fluids and creatinine is trending 

down.  Echocardiogram pending and carotid artery Doppler no significant 

stenosis.  Patient's blood pressure is running low, medications  been adjusted 

by cardiology . The treatment plan was discussed in detail with his wife at 

bedside.overall prognosis is guarded due to multiple chronic medical conditions.

 Cardiology and neurology following closely.

## 2021-04-25 NOTE — P.PN
Subjective


Progress Note Date: 04/25/21





Patient was seen for a follow-up via Teleneurology.  Patient's wife was also 

present.  Per patient's wife, he has been sleeping for the last 2 hours.  

Patient's wife states that he has been diagnosed with doing body dementia and 

Parkinson's disease.  Also on Seroquel 50 mg at bedtime for some behavioral p

roblems.  It also helps him sleep.  Patient is also on Requip for restless legs.

 Patient's blood pressure was checked at the time of evaluation by the nurse and

was 93/52.  It is very low.  Patient offers no new complaints.





Objective





- Vital Signs


Vital signs: 


                                   Vital Signs











Temp  97.5 F L  04/25/21 20:00


 


Pulse  68   04/25/21 20:00


 


Resp  18   04/25/21 20:00


 


BP  107/54   04/25/21 20:00


 


Pulse Ox  96   04/25/21 20:00








                                 Intake & Output











 04/25/21 04/25/21 04/26/21





 06:59 18:59 06:59


 


Intake Total  115 10


 


Output Total  1 


 


Balance  114 10


 


Weight 111.1 kg  


 


Intake:   


 


  IV   10


 


    0.9   10


 


  Oral  115 


 


Output:   


 


  Urine  1 


 


Other:   


 


  Voiding Method Diaper Diaper Diaper


 


  # Voids 1 1 1














- Exam





On examination patient is alert and awake, slightly slowly latency time.  Speech

and language functions are normal.  Patient knows he is in [facility] 

and that it is April and the year is 201.  He appears slightly shaky.  Cranial 

nerves are normal.  Muscle strength is normal.  Detailed testing deferred.





- Labs


CBC & Chem 7: 


                                 04/25/21 08:36





                                 04/25/21 08:36


Labs: 


                  Abnormal Lab Results - Last 24 Hours (Table)











  04/25/21 04/25/21 04/25/21 Range/Units





  08:36 08:36 08:36 


 


RBC   3.58 L   (4.30-5.90)  m/uL


 


Hgb   12.3 L   (13.0-17.5)  gm/dL


 


Hct   38.1 L   (39.0-53.0)  %


 


MCV   106.6 H   (80.0-100.0)  fL


 


PT  22.4 H    (9.0-12.0)  sec


 


INR  2.3 H    (<1.2)  


 


Potassium    3.4 L  (3.5-5.1)  mmol/L


 


Chloride    109 H  ()  mmol/L


 


Carbon Dioxide    33 H  (22-30)  mmol/L


 


BUN    55 H  (9-20)  mg/dL


 


Glucose    104 H  (74-99)  mg/dL


 


POC Glucose (mg/dL)     (75-99)  mg/dL


 


Calcium    8.3 L  (8.4-10.2)  mg/dL


 


Total Protein    4.9 L  (6.3-8.2)  g/dL


 


Albumin    2.5 L  (3.5-5.0)  g/dL














  04/25/21 04/25/21 Range/Units





  16:29 19:53 


 


RBC    (4.30-5.90)  m/uL


 


Hgb    (13.0-17.5)  gm/dL


 


Hct    (39.0-53.0)  %


 


MCV    (80.0-100.0)  fL


 


PT    (9.0-12.0)  sec


 


INR    (<1.2)  


 


Potassium    (3.5-5.1)  mmol/L


 


Chloride    ()  mmol/L


 


Carbon Dioxide    (22-30)  mmol/L


 


BUN    (9-20)  mg/dL


 


Glucose    (74-99)  mg/dL


 


POC Glucose (mg/dL)  111 H  136 H  (75-99)  mg/dL


 


Calcium    (8.4-10.2)  mg/dL


 


Total Protein    (6.3-8.2)  g/dL


 


Albumin    (3.5-5.0)  g/dL














Assessment and Plan


Assessment: 





* Episode of unresponsiveness, unclear etiology.  Probably due to hypotension.  

  Patient's blood at this time is 93/52.  Patient on multiple cardiac 

  medications, and his blood pressures has been running low in systolic 94-96 

  range at the time of symptoms.  No evidence of arrhythmia on telemetry 

  monitoring.  EEG very unlikely with negative EEG.  Patient does have a 

  defibrillator placed, and the battery changed recently.


* Mild cognitive impairment.


* Diabetes


* Hypertension


* Hyperlipidemia


* Coronary artery disease


Plan: 





* Cardiology input appreciated.  Patient's dose of lisinopril has been decreased

  since last night.  However his blood pressure is still very low, 93/52 at this

  time.  Probably needs further optimizing down the dose of blood pressure 

  medication.


* Patient is excessively somnolent.  We will decrease Seroquel from 50 down to 

  25 mg.  Continue Requip for now.


* EEG was performed today, which was abnormal due to background slowing of mild 

  to moderate degree.  This is suggestive of generalized cerebral dysfunction, 

  as can be seen with toxic metabolic encephalopathy or due to diffuse 

  structural brain abnormality.  No epileptiform activity was seen.  EKG channel

  showed arrhythmia.  Clinical correlation recommended.


* Hemoglobin A1c 5.5, B12 864 in TSH normal.


* Carotid Doppler showed no significant stenosis, antegrade flow in both josie

  tebral arteries.

## 2021-04-26 VITALS — DIASTOLIC BLOOD PRESSURE: 62 MMHG | TEMPERATURE: 98.1 F | SYSTOLIC BLOOD PRESSURE: 94 MMHG | HEART RATE: 83 BPM

## 2021-04-26 LAB
ANION GAP SERPL CALC-SCNC: 3 MMOL/L
BASOPHILS # BLD MANUAL: 0.07 K/UL (ref 0–0.2)
BUN SERPL-SCNC: 48 MG/DL (ref 9–20)
CALCIUM SPEC-MCNC: 8.4 MG/DL (ref 8.4–10.2)
CELLS COUNTED: 100
CHLORIDE SERPL-SCNC: 106 MMOL/L (ref 98–107)
CO2 SERPL-SCNC: 39 MMOL/L (ref 22–30)
EOSINOPHIL # BLD MANUAL: 0.22 K/UL (ref 0–0.7)
ERYTHROCYTE [DISTWIDTH] IN BLOOD BY AUTOMATED COUNT: 3.7 M/UL (ref 4.3–5.9)
ERYTHROCYTE [DISTWIDTH] IN BLOOD: 15.4 % (ref 11.5–15.5)
GLUCOSE BLD-MCNC: 143 MG/DL (ref 75–99)
GLUCOSE BLD-MCNC: 79 MG/DL (ref 75–99)
GLUCOSE SERPL-MCNC: 75 MG/DL (ref 74–99)
HCT VFR BLD AUTO: 39.8 % (ref 39–53)
HGB BLD-MCNC: 12.5 GM/DL (ref 13–17.5)
INR PPP: 2.4 (ref ?–1.2)
LYMPHOCYTES # BLD MANUAL: 1.08 K/UL (ref 1–4.8)
MCH RBC QN AUTO: 33.8 PG (ref 25–35)
MCHC RBC AUTO-ENTMCNC: 31.4 G/DL (ref 31–37)
MCV RBC AUTO: 107.7 FL (ref 80–100)
MONOCYTES # BLD MANUAL: 0.65 K/UL (ref 0–1)
NEUTROPHILS NFR BLD MANUAL: 71 %
NEUTS SEG # BLD MANUAL: 5.1 K/UL (ref 1.3–7.7)
PLATELET # BLD AUTO: 233 K/UL (ref 150–450)
POTASSIUM SERPL-SCNC: 3.6 MMOL/L (ref 3.5–5.1)
PT BLD: 23.2 SEC (ref 9–12)
SODIUM SERPL-SCNC: 148 MMOL/L (ref 137–145)
WBC # BLD AUTO: 7.2 K/UL (ref 3.8–10.6)

## 2021-04-26 RX ADMIN — ACETAMINOPHEN PRN MG: 500 TABLET ORAL at 13:13

## 2021-04-26 RX ADMIN — ISOSORBIDE MONONITRATE SCH MG: 30 TABLET, EXTENDED RELEASE ORAL at 08:32

## 2021-04-26 RX ADMIN — METOPROLOL SUCCINATE SCH MG: 100 TABLET, EXTENDED RELEASE ORAL at 08:31

## 2021-04-26 RX ADMIN — ACETAMINOPHEN PRN MG: 500 TABLET ORAL at 06:07

## 2021-04-26 RX ADMIN — THERA TABS SCH EACH: TAB at 08:31

## 2021-04-26 RX ADMIN — SILVER SULFADIAZINE SCH APPLIC: 10 CREAM TOPICAL at 08:40

## 2021-04-26 RX ADMIN — FAMOTIDINE SCH MG: 20 TABLET, FILM COATED ORAL at 08:31

## 2021-04-26 RX ADMIN — FUROSEMIDE SCH MG: 40 TABLET ORAL at 08:31

## 2021-04-26 RX ADMIN — SPIRONOLACTONE SCH MG: 25 TABLET, FILM COATED ORAL at 08:31

## 2021-04-26 NOTE — P.PN
Subjective


Progress Note Date: 04/26/21


This 76-year-old gentleman with history of cardiomyopathy and AICD had upgrading

of the AICD to biventricular AICD recently.  She also had cardioversion for 

atrial fibrillation.  She has history of Guillain-Barr syndrome but was able to

walk with a walker before cardioversion.  Apparently patient was at subacute 

rehab and the lipid episodes of sudden slumping and the unresponsiveness.  

Patient is admitted to the hospital with significant weakness.  Patient was 

apparently also hypotensive.  His creatinine has gone up to 1.8 from baseline of

1.3.  He is admitted with a diagnosis of altered mental status and possible 

syncope.  He has cardiomyopathy with an ejection fraction of 25%.  

Echocardiogram today reveals similar findings without any acute changes.  Mild 

troponin elevation was felt to be nonspecific.  Patient is alert and wanted to 

go home.  He still weak.  His has a sacral ulcer that is being addressed, but 

overall his clinical status is stable and is being discharged home.  Follow-up 

with  as an outpatient





Objective





- Vital Signs


Vital signs: 


                                   Vital Signs











Temp  98.1 F   04/26/21 12:00


 


Pulse  83   04/26/21 12:00


 


Resp  18   04/26/21 12:00


 


BP  94/62   04/26/21 12:00


 


Pulse Ox  92 L  04/26/21 12:00








                                 Intake & Output











 04/25/21 04/26/21 04/26/21





 18:59 06:59 18:59


 


Intake Total 115 10 500


 


Output Total 1  


 


Balance 114 10 500


 


Weight  112.6 kg 


 


Intake:   


 


  IV  10 


 


    0.9  10 


 


  Oral 115  500


 


Output:   


 


  Urine 1  


 


Other:   


 


  Voiding Method Diaper Diaper Diaper


 


  # Voids 1 1 














- Exam





GENERAL EXAM: Patient is alert and oriented and doesn't appear to be in any 

acute distress.  Slow in mentation


HEENT: Normocephalic. Normal reaction of pupils, equal size, normal range of 

extraocular motion. No erythema or exudates in the throat.


NECK: No masses, no nuchal rigidity.


CHEST: No chest wall deformity.


LUNGS: Equal air entry with no crackles or wheeze.


HEART: S1 and S2 normal with no audible mumurs or gallops. Regular rhythm, 

femorals equal on both sides..


ABDOMEN: No hepatosplenomegaly, normal bowel sounds, no guarding or rigidity.


SKIN: No rashes


CENTRAL NERVOUS SYSTEM: No focal deficits.  Moving all the extremities


EXTREMITIES: No cyanosis, clubbing or edema.





- Labs


CBC & Chem 7: 


                                 04/26/21 08:55





                                 04/26/21 08:55


Labs: 


                  Abnormal Lab Results - Last 24 Hours (Table)











  04/25/21 04/25/21 04/26/21 Range/Units





  16:29 19:53 08:55 


 


RBC     (4.30-5.90)  m/uL


 


Hgb     (13.0-17.5)  gm/dL


 


MCV     (80.0-100.0)  fL


 


Macrocytosis     


 


PT    23.2 H  (9.0-12.0)  sec


 


INR    2.4 H  (<1.2)  


 


Sodium     (137-145)  mmol/L


 


Carbon Dioxide     (22-30)  mmol/L


 


BUN     (9-20)  mg/dL


 


POC Glucose (mg/dL)  111 H  136 H   (75-99)  mg/dL














  04/26/21 04/26/21 04/26/21 Range/Units





  08:55 08:55 11:49 


 


RBC  3.70 L    (4.30-5.90)  m/uL


 


Hgb  12.5 L    (13.0-17.5)  gm/dL


 


MCV  107.7 H    (80.0-100.0)  fL


 


Macrocytosis  Marked A    


 


PT     (9.0-12.0)  sec


 


INR     (<1.2)  


 


Sodium   148 H   (137-145)  mmol/L


 


Carbon Dioxide   39 H   (22-30)  mmol/L


 


BUN   48 H   (9-20)  mg/dL


 


POC Glucose (mg/dL)    143 H  (75-99)  mg/dL














Assessment and Plan


(1) Cardiomyopathy


Current Visit: Yes   Status: Acute   Code(s): I42.9 - CARDIOMYOPATHY, UNSPECIFIE

D   SNOMED Code(s): 23695212


   





(2) Heart failure


Current Visit: No   Status: Acute   Code(s): I50.9 - HEART FAILURE, UNSPECIFIED 

 SNOMED Code(s): 72310948


   





(3) Biventricular ICD (implantable cardioverter-defibrillator) in place


Current Visit: Yes   Status: Acute   Code(s): Z95.810 - PRESENCE OF AUTOMATIC 

(IMPLANTABLE) CARDIAC DEFIBRILLATOR   SNOMED Code(s): 663455494


   


Plan: 


Patient seemed to be clinically stable.  This seemed to be some improvement in 

mental status.  Patient could be discharged to rehab center

## 2021-04-26 NOTE — P.CONS
History of Present Illness





- Reason for Consult


Consult date: 04/26/21


wound care





- History of Present Illness


This is a 76-year-old patient being seen on [facility] for nonhealing ulceration to

the sacrum.  Patient states that the ulceration has been there for the past 12 

years.  It will come and go.  He utilizes Silvadene.  When he has pain or 

discomfort he will apply Silvadene and after few days it will improve.  Patient 

found to have ecchymosis and unstageable pressure ulcer to the sacrum.  The 

periwound shows maceration excoriation.  Patient is complaining of pain and 

discomfort to the site.  He is currently a resident at [facility] we'll he'll 

return to today.  Discussed with patient in length to utilize a Roho cushion.





Review Of Systems:


Constitutional: No fever, no chills, no night sweats.  No weight change.  No 

weakness, fatigue or lethargy.  No daytime sleepiness.


Integumentary:reports wounds, no lesions.  No rash or pruritus.  No unusual 

bruising.  No change in hair or nails.





Physical exam:


General Appearance: Alert, cooperative, no distress, appears stated age.


Skin: See HPI all other Skin color, texture, tugor normal, no rashes or lesions.


Neurologic: Alert oriented x3 





Assessment/plan:


1.  Unstageable pressure ulcer to sacrum: Apply Silvadene and a border foam s

acrum dressing changes daily.  Utilize a air-filled cushion for sitting at all 

times.  Patient may benefit from outpatient wound care once stable and at home. 

Patient and family questions were answered and agreeable to plan.





Thanks for the consultation any questions please contact the wound care center





DNP note has been reviewed and discussed with Dr. Jean and the impression 

and plan of care has been directed as dictated. 








Past Medical History


Past Medical History: Diabetes Mellitus, Eye Disorder, GERD/Reflux, 

Hyperlipidemia, Hypertension, Memory Impairment, Myocardial Infarction (MI), 

Prostate Disorder, Thyroid Disorder


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 

WITH HALLUCINATIONS.  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

and now resolved, SEE DR CHINCHILLA'S HISTORY AND PHYSICAL FOR CARDIAC HISTORY, 

CATARACT RIGHT EYE.  lewy body dementia with parkinsons recently increase upper 

body movement especially at night


Last Myocardial Infarction Date:: 1993


History of Any Multi-Drug Resistant Organisms: None Reported


Past Surgical History: AICD, Heart Catheterization With Stent, Pacemaker


Additional Past Surgical History / Comment(s): SEVERAL CARDIOVERSIONS.  LT 

CATARACT REMOVED


Past Anesthesia/Blood Transfusion Reactions: No Reported Reaction


Date of Last Stent Placement:: 1993


Type of Cardiac Device: Permanent Pacemaker, AICD


Device Placement Date:: 1994, 11/2010


Past Psychological History: Anxiety


Smoking Status: Former smoker


Past Alcohol Use History: Rare


Past Drug Use History: None Reported





- Past Family History


  ** Mother


Additional Family Medical History / Comment(s): SEVERE LEG EDEMA





Medications and Allergies


                                Home Medications











 Medication  Instructions  Recorded  Confirmed  Type


 


Atorvastatin [Lipitor] 20 mg PO HS 04/09/18 04/22/21 History


 


Famotidine [Pepcid] 20 mg PO DAILY@0600 04/09/18 04/22/21 History


 


Finasteride [Proscar] 5 mg PO HS 04/09/18 04/22/21 History


 


Furosemide [Lasix] 40 mg PO DAILY@0600 04/09/18 04/22/21 History


 


allopurinoL [Zyloprim] 300 mg PO HS 04/09/18 04/22/21 History


 


glipiZIDE [Glucotrol] 2.5 mg PO BID 04/09/18 04/22/21 History


 


Warfarin [Coumadin] 2.5 mg PO DAILY@1600 11/15/18 04/22/21 History


 


QUEtiapine [SEROquel] 50 mg PO HS 03/31/21 04/22/21 History


 


rOPINIRole HCL [Requip] 0.25 mg PO HS 04/01/21 04/22/21 History


 


Levothyroxine Sodium 25 mcg PO HS 04/12/21 04/22/21 History


 


bisacodyL [Dulcolax] 5 mg PO DAILY PRN 04/12/21 04/22/21 History


 


Metoprolol Succinate (ER) [Toprol 100 mg PO BID@0800,2000 04/18/21 04/22/21 

History





XL]    


 


Isosorbide Mononitrate ER [Imdur] 30 mg PO DAILY@0600 04/22/21 04/22/21 History


 


Multivitamins, Thera [Multivitamin 1 tab PO DAILY@0600 04/22/21 04/22/21 History





(formulary)]    


 


Spironolactone [Aldactone] 25 mg PO DAILY@0600 04/22/21 04/22/21 History


 


lisinopriL [Zestril] 5 mg PO HS@2000 #30 tab 04/26/21  Rx








                                    Allergies











Allergy/AdvReac Type Severity Reaction Status Date / Time


 


No Known Allergies Allergy   Verified 04/22/21 10:36














Physical Exam


Vitals: 


                                   Vital Signs











  Temp Pulse Resp BP BP Pulse Ox


 


 04/26/21 08:00  97.1 F L  79  18  101/62   96


 


 04/26/21 04:00  97.8 F  85  18   120/71  95


 


 04/26/21 02:00   77  18   


 


 04/25/21 23:15  97.6 F  77  18   117/63  97


 


 04/25/21 20:00  97.5 F L  68  18   107/54  96


 


 04/25/21 15:10  98.6 F  67  18   93/52  97


 


 04/25/21 13:47   82  16   








                                Intake and Output











 04/25/21 04/26/21 04/26/21





 22:59 06:59 14:59


 


Intake Total 10  500


 


Balance 10  500


 


Intake:   


 


  IV 10  


 


    0.9 10  


 


  Oral   500


 


Other:   


 


  Voiding Method Diaper Diaper Diaper


 


  # Voids 1 1 


 


  Weight  112.6 kg 














Results


CBC & Chem 7: 


                                 04/26/21 08:55





                                 04/26/21 08:55


Labs: 


                  Abnormal Lab Results - Last 24 Hours (Table)











  04/25/21 04/25/21 04/26/21 Range/Units





  16:29 19:53 08:55 


 


RBC     (4.30-5.90)  m/uL


 


Hgb     (13.0-17.5)  gm/dL


 


MCV     (80.0-100.0)  fL


 


Macrocytosis     


 


PT    23.2 H  (9.0-12.0)  sec


 


INR    2.4 H  (<1.2)  


 


Sodium     (137-145)  mmol/L


 


Carbon Dioxide     (22-30)  mmol/L


 


BUN     (9-20)  mg/dL


 


POC Glucose (mg/dL)  111 H  136 H   (75-99)  mg/dL














  04/26/21 04/26/21 04/26/21 Range/Units





  08:55 08:55 11:49 


 


RBC  3.70 L    (4.30-5.90)  m/uL


 


Hgb  12.5 L    (13.0-17.5)  gm/dL


 


MCV  107.7 H    (80.0-100.0)  fL


 


Macrocytosis  Marked A    


 


PT     (9.0-12.0)  sec


 


INR     (<1.2)  


 


Sodium   148 H   (137-145)  mmol/L


 


Carbon Dioxide   39 H   (22-30)  mmol/L


 


BUN   48 H   (9-20)  mg/dL


 


POC Glucose (mg/dL)    143 H  (75-99)  mg/dL














Assessment and Plan


(1) Unstageable pressure ulcer of sacral region


Current Visit: Yes   Status: Acute   Code(s): L89.150 - PRESSURE ULCER OF SACRAL

 REGION, UNSTAGEABLE   SNOMED Code(s): 047041282

## 2021-04-26 NOTE — P.DS
Providers


Date of admission: 


04/22/21 12:43





Attending physician: 


Simone Villalta





Consults: 





                                        





04/22/21 12:44


Consult Physician Urgent 


   Consulting Provider: Vivian Sauceda


   Consult Reason/Comments: acute encephalopathy


   Do you want consulting provider notified?: Yes





04/23/21 20:00


Consult Physician Routine 


   Consulting Provider: Helder Jaimes


   Consult Reason/Comments: low blood pressure; blood pressure medications


   Do you want consulting provider notified?: Yes, Notify in am











Primary care physician: 


Simone Villalta








- Discharge Diagnosis(es)


(1) Acute encephalopathy


Current Visit: Yes   Status: Acute   





(2) Altered mental status


Current Visit: No   Status: Acute   





(3) Dementia


Current Visit: No   Status: Acute   





(4) Hypoxia


Current Visit: No   Status: Acute   


Hospital Course: 





The patient is discharged after having significant altered mental status.  The 

patient had significant workup and consultation by cardiology and neurology.  

Workup was essentially negative for any acute pathology and medications were 

adjusted.  The patient is discharged in stable condition to follow-up with me in

about one week.


Patient Condition at Discharge: Stable





Plan - Discharge Summary


Discharge Rx Participant: Yes


New Discharge Prescriptions: 


No Action


   Famotidine [Pepcid] 20 mg PO DAILY@0600


   Atorvastatin [Lipitor] 20 mg PO HS


   glipiZIDE [Glucotrol] 2.5 mg PO BID


   Furosemide [Lasix] 40 mg PO DAILY@0600


   Finasteride [Proscar] 5 mg PO HS


   allopurinoL [Zyloprim] 300 mg PO HS


   Warfarin [Coumadin] 2.5 mg PO DAILY@1600


   QUEtiapine [SEROquel] 50 mg PO HS


   rOPINIRole HCL [Requip] 0.25 mg PO HS


   Levothyroxine Sodium 25 mcg PO HS


   Multivitamins, Thera [Multivitamin (formulary)] 1 tab PO DAILY@0600


   Benazepril HCl 20 mg PO HS@2000


   bisacodyL [Dulcolax] 5 mg PO DAILY PRN


     PRN Reason: Constipation


   Metoprolol Succinate (ER) [Toprol XL] 100 mg PO BID@0800,2000


   Isosorbide Mononitrate ER [Imdur] 30 mg PO DAILY@0600


   Spironolactone [Aldactone] 25 mg PO DAILY@0600


Discharge Medication List





Atorvastatin [Lipitor] 20 mg PO HS 04/09/18 [History]


Famotidine [Pepcid] 20 mg PO DAILY@0600 04/09/18 [History]


Finasteride [Proscar] 5 mg PO HS 04/09/18 [History]


Furosemide [Lasix] 40 mg PO DAILY@0600 04/09/18 [History]


allopurinoL [Zyloprim] 300 mg PO HS 04/09/18 [History]


glipiZIDE [Glucotrol] 2.5 mg PO BID 04/09/18 [History]


Warfarin [Coumadin] 2.5 mg PO DAILY@1600 11/15/18 [History]


QUEtiapine [SEROquel] 50 mg PO HS 03/31/21 [History]


rOPINIRole HCL [Requip] 0.25 mg PO HS 04/01/21 [History]


Benazepril HCl 20 mg PO HS@2000 04/12/21 [History]


Levothyroxine Sodium 25 mcg PO HS 04/12/21 [History]


bisacodyL [Dulcolax] 5 mg PO DAILY PRN 04/12/21 [History]


Metoprolol Succinate (ER) [Toprol XL] 100 mg PO BID@0800,2000 04/18/21 [History]


Isosorbide Mononitrate ER [Imdur] 30 mg PO DAILY@0600 04/22/21 [History]


Multivitamins, Thera [Multivitamin (formulary)] 1 tab PO DAILY@0600 04/22/21 

[History]


Spironolactone [Aldactone] 25 mg PO DAILY@0600 04/22/21 [History]








Follow up Appointment(s)/Referral(s): 


Chuy Ramires DO [STAFF PHYSICIAN] - 1-2 days

## 2021-04-26 NOTE — ECHOF
Referral Reason:re: LV function, s/p BiV upgrade



MEASUREMENTS

--------

HEIGHT: 188.0 cm

WEIGHT: 112.5 kg

BP: 120/71

RVIDd:   3.7 cm     (< 3.3)

IVSd:   1.2 cm     (0.6 - 1.1)

LVIDd:   6.7 cm     (3.9 - 5.3)

LVPWd:   1.3 cm     (0.6 - 1.1)

IVSs:   1.6 cm

LVIDs:   5.7 cm

LVPWs:   1.5 cm

LAESV Index (A-L):   53.20 ml/m

Ao Diam:   3.6 cm     (2.0 - 3.7)

AV Cusp:   2.3 cm     (1.5 - 2.6)

MV EXCURSION:   17.459 mm     (> 18.000)

MV EF SLOPE:   77 mm/s     (70 - 150)

EPSS:   1.9 cm

RAP:   5.00 mmHg

RVSP:   53.90 mmHg







FINDINGS

--------

This was a technically difficult study with suboptimal views.

The left ventricle is moderately dilated.   There is mild concentric left ventricular hypertrophy.   
There is severe global hypokinesis of LV .   Overall left ventricular systolic function is severely i
mpaired with, an EF between 20 - 25 %.

The right ventricle is mildly enlarged.

LA is severely dilated >40 ml/m2

The right atrium is moderately enlarged.   Electronic pacemaker lead seen in the right atrial cavity.


5.0mg of Lumason was utilized for enhancement of images

Interatrial and interventricular septum intact.

There is mild aortic valve sclerosis.   There is no evidence of aortic regurgitation.   There is no e
vidence of aortic stenosis.

Mild mitral annular calcification present.   Moderate mitral regurgitation is present.   Mild mitral 
stenosis.

Moderate tricuspid regurgitation present.   There is moderate to severe pulmonary hypertension.   The
 right ventricular systolic pressure, as measured by Doppler, is 53.90mmHg.

The pulmonic valve was not well visualized.   There is no pulmonic regurgitation present.

The aortic root size is normal.

IVC Not well visulized.

There is no pericardial effusion.



CONCLUSIONS

--------

1. This was a technically difficult study with suboptimal views.

2. The left ventricle is moderately dilated.

3. There is mild concentric left ventricular hypertrophy.

4. There is severe global hypokinesis of LV .

5. Overall left ventricular systolic function is severely impaired with, an EF between 20 - 25 %.

6. The right ventricle is mildly enlarged.

7. LA is severely dilated >40 ml/m2

8. The right atrium is moderately enlarged.

9. There is mild aortic valve sclerosis.

10. Mild mitral annular calcification present.

11. Moderate mitral regurgitation is present.

12. Moderate tricuspid regurgitation present.

13. There is moderate to severe pulmonary hypertension.





SONOGRAPHER: Abeba Olivas RDCS

## 2021-04-27 NOTE — P.PN
Subjective


Progress Note Date: 04/26/21





Patient was seen for a follow-up.  Patient's wife was also present.  Per 

patient's wife, he is slightly better.  Patient's medications has been digested 

including blood pressure medication.  Seroquel was decreased from 50 down to 25 

mg.  





Objective





- Vital Signs


Vital signs: 


                                   Vital Signs











Temp  98.1 F   04/26/21 12:00


 


Pulse  83   04/26/21 14:00


 


Resp  18   04/26/21 14:00


 


BP  94/62   04/26/21 12:00


 


Pulse Ox  92 L  04/26/21 12:00








                                 Intake & Output











 04/26/21 04/26/21 04/27/21





 06:59 18:59 06:59


 


Intake Total 10 740 


 


Balance 10 740 


 


Weight 112.6 kg  


 


Intake:   


 


  IV 10  


 


    0.9 10  


 


  Oral  740 


 


Other:   


 


  Voiding Method Diaper Diaper 


 


  # Voids 1  














- Exam





On examination patient is alert and awake, slightly slowly latency time.  Speech

and language functions are normal.  Patient knows he is in [facility] 

and that it is April and the year is 201.  He appears slightly shaky.  Cranial 

nerves are normal.  Muscle strength is normal.  Detailed testing deferred.





- Labs


CBC & Chem 7: 


                                 04/26/21 08:55





                                 04/26/21 08:55


Labs: 


                  Abnormal Lab Results - Last 24 Hours (Table)











  04/26/21 04/26/21 04/26/21 Range/Units





  08:55 08:55 08:55 


 


RBC   3.70 L   (4.30-5.90)  m/uL


 


Hgb   12.5 L   (13.0-17.5)  gm/dL


 


MCV   107.7 H   (80.0-100.0)  fL


 


Macrocytosis   Marked A   


 


PT  23.2 H    (9.0-12.0)  sec


 


INR  2.4 H    (<1.2)  


 


Sodium    148 H  (137-145)  mmol/L


 


Carbon Dioxide    39 H  (22-30)  mmol/L


 


BUN    48 H  (9-20)  mg/dL


 


POC Glucose (mg/dL)     (75-99)  mg/dL














  04/26/21 Range/Units





  11:49 


 


RBC   (4.30-5.90)  m/uL


 


Hgb   (13.0-17.5)  gm/dL


 


MCV   (80.0-100.0)  fL


 


Macrocytosis   


 


PT   (9.0-12.0)  sec


 


INR   (<1.2)  


 


Sodium   (137-145)  mmol/L


 


Carbon Dioxide   (22-30)  mmol/L


 


BUN   (9-20)  mg/dL


 


POC Glucose (mg/dL)  143 H  (75-99)  mg/dL














Assessment and Plan


Assessment: 





* Episode of unresponsiveness, unclear etiology.  Probably due to hypotension.  

  Patient's blood pressure has been running around 93/52.  Patient on multiple 

  cardiac medications, and his blood pressures has been running low in systolic 

  94-96 range at the time of symptoms.  No evidence of arrhythmia on telemetry 

  monitoring.  Seizures very unlikely with negative EEG.  Patient does have a 

  defibrillator placed, and the battery changed recently.


* Mild cognitive impairment.


* Diabetes


* Hypertension


* Hyperlipidemia


* Coronary artery disease


Plan: 





* Cardiology input appreciated.  Patient's dose of blood pressure medications 

  have been decreased further since last night.  


* Patient is excessively somnolent.  We will decrease Seroquel from 50 down to 

  25 mg.  Continue Requip for now.


* EEG was performed today, which was abnormal due to background slowing of mild 

  to moderate degree.  This is suggestive of generalized cerebral dysfunction, 

  as can be seen with toxic metabolic encephalopathy or due to diffuse structura

  l brain abnormality.  No epileptiform activity was seen.  EKG channel showed 

  arrhythmia.  Clinical correlation recommended.


* Hemoglobin A1c 5.5, B12 864 in TSH normal.


* Carotid Doppler showed no significant stenosis, antegrade flow in both 

  vertebral arteries.


* Neurologically clear for discharge.

## 2021-05-07 ENCOUNTER — HOSPITAL ENCOUNTER (INPATIENT)
Dept: HOSPITAL 47 - EC | Age: 76
LOS: 5 days | Discharge: HOSPICE | DRG: 871 | End: 2021-05-12
Attending: HOSPITALIST | Admitting: HOSPITALIST
Payer: MEDICARE

## 2021-05-07 VITALS — BODY MASS INDEX: 30 KG/M2

## 2021-05-07 DIAGNOSIS — Z51.5: ICD-10-CM

## 2021-05-07 DIAGNOSIS — Z95.810: ICD-10-CM

## 2021-05-07 DIAGNOSIS — J44.9: ICD-10-CM

## 2021-05-07 DIAGNOSIS — I48.19: ICD-10-CM

## 2021-05-07 DIAGNOSIS — G31.83: ICD-10-CM

## 2021-05-07 DIAGNOSIS — E78.5: ICD-10-CM

## 2021-05-07 DIAGNOSIS — Z86.69: ICD-10-CM

## 2021-05-07 DIAGNOSIS — E86.0: ICD-10-CM

## 2021-05-07 DIAGNOSIS — N40.0: ICD-10-CM

## 2021-05-07 DIAGNOSIS — Z79.84: ICD-10-CM

## 2021-05-07 DIAGNOSIS — Z79.899: ICD-10-CM

## 2021-05-07 DIAGNOSIS — I50.22: ICD-10-CM

## 2021-05-07 DIAGNOSIS — L89.153: ICD-10-CM

## 2021-05-07 DIAGNOSIS — G82.20: ICD-10-CM

## 2021-05-07 DIAGNOSIS — K59.00: ICD-10-CM

## 2021-05-07 DIAGNOSIS — E87.0: ICD-10-CM

## 2021-05-07 DIAGNOSIS — Z98.42: ICD-10-CM

## 2021-05-07 DIAGNOSIS — I27.20: ICD-10-CM

## 2021-05-07 DIAGNOSIS — Z66: ICD-10-CM

## 2021-05-07 DIAGNOSIS — N18.9: ICD-10-CM

## 2021-05-07 DIAGNOSIS — G93.41: ICD-10-CM

## 2021-05-07 DIAGNOSIS — Z74.01: ICD-10-CM

## 2021-05-07 DIAGNOSIS — E11.42: ICD-10-CM

## 2021-05-07 DIAGNOSIS — J69.0: ICD-10-CM

## 2021-05-07 DIAGNOSIS — A41.9: Primary | ICD-10-CM

## 2021-05-07 DIAGNOSIS — F41.9: ICD-10-CM

## 2021-05-07 DIAGNOSIS — E11.22: ICD-10-CM

## 2021-05-07 DIAGNOSIS — F02.80: ICD-10-CM

## 2021-05-07 DIAGNOSIS — M15.9: ICD-10-CM

## 2021-05-07 DIAGNOSIS — E56.9: ICD-10-CM

## 2021-05-07 DIAGNOSIS — R65.21: ICD-10-CM

## 2021-05-07 DIAGNOSIS — J98.11: ICD-10-CM

## 2021-05-07 DIAGNOSIS — I25.5: ICD-10-CM

## 2021-05-07 DIAGNOSIS — R79.1: ICD-10-CM

## 2021-05-07 DIAGNOSIS — I13.0: ICD-10-CM

## 2021-05-07 DIAGNOSIS — E03.9: ICD-10-CM

## 2021-05-07 DIAGNOSIS — Z20.822: ICD-10-CM

## 2021-05-07 DIAGNOSIS — K21.9: ICD-10-CM

## 2021-05-07 DIAGNOSIS — I25.10: ICD-10-CM

## 2021-05-07 DIAGNOSIS — Z95.5: ICD-10-CM

## 2021-05-07 DIAGNOSIS — Z87.891: ICD-10-CM

## 2021-05-07 DIAGNOSIS — N17.0: ICD-10-CM

## 2021-05-07 DIAGNOSIS — J96.01: ICD-10-CM

## 2021-05-07 DIAGNOSIS — N39.0: ICD-10-CM

## 2021-05-07 DIAGNOSIS — J96.02: ICD-10-CM

## 2021-05-07 DIAGNOSIS — E11.649: ICD-10-CM

## 2021-05-07 DIAGNOSIS — I25.2: ICD-10-CM

## 2021-05-07 DIAGNOSIS — T45.515A: ICD-10-CM

## 2021-05-07 DIAGNOSIS — I08.3: ICD-10-CM

## 2021-05-07 DIAGNOSIS — Z79.01: ICD-10-CM

## 2021-05-07 DIAGNOSIS — E66.9: ICD-10-CM

## 2021-05-07 DIAGNOSIS — Z95.1: ICD-10-CM

## 2021-05-07 LAB
ALBUMIN SERPL-MCNC: 2.8 G/DL (ref 3.5–5)
ALP SERPL-CCNC: 90 U/L (ref 38–126)
ALT SERPL-CCNC: 9 U/L (ref 4–49)
ANION GAP SERPL CALC-SCNC: 4 MMOL/L
APTT BLD: 26.2 SEC (ref 22–30)
AST SERPL-CCNC: 21 U/L (ref 17–59)
BASOPHILS # BLD AUTO: 0 K/UL (ref 0–0.2)
BASOPHILS NFR BLD AUTO: 0 %
BUN SERPL-SCNC: 34 MG/DL (ref 9–20)
CALCIUM SPEC-MCNC: 8.2 MG/DL (ref 8.4–10.2)
CHLORIDE SERPL-SCNC: 105 MMOL/L (ref 98–107)
CO2 SERPL-SCNC: 39 MMOL/L (ref 22–30)
EOSINOPHIL # BLD AUTO: 0.1 K/UL (ref 0–0.7)
EOSINOPHIL NFR BLD AUTO: 1 %
ERYTHROCYTE [DISTWIDTH] IN BLOOD BY AUTOMATED COUNT: 3.6 M/UL (ref 4.3–5.9)
ERYTHROCYTE [DISTWIDTH] IN BLOOD: 16.2 % (ref 11.5–15.5)
GLUCOSE BLD-MCNC: 75 MG/DL (ref 75–99)
GLUCOSE BLD-MCNC: 76 MG/DL (ref 75–99)
GLUCOSE BLD-MCNC: 89 MG/DL (ref 75–99)
GLUCOSE SERPL-MCNC: 79 MG/DL (ref 74–99)
HCT VFR BLD AUTO: 38.6 % (ref 39–53)
HGB BLD-MCNC: 11.8 GM/DL (ref 13–17.5)
HYALINE CASTS UR QL AUTO: 17 /LPF (ref 0–2)
INR PPP: 1.7 (ref ?–1.2)
LYMPHOCYTES # SPEC AUTO: 1.5 K/UL (ref 1–4.8)
LYMPHOCYTES NFR SPEC AUTO: 17 %
MCH RBC QN AUTO: 32.8 PG (ref 25–35)
MCHC RBC AUTO-ENTMCNC: 30.6 G/DL (ref 31–37)
MCV RBC AUTO: 107.2 FL (ref 80–100)
MONOCYTES # BLD AUTO: 0.4 K/UL (ref 0–1)
MONOCYTES NFR BLD AUTO: 5 %
NEUTROPHILS # BLD AUTO: 6.7 K/UL (ref 1.3–7.7)
NEUTROPHILS NFR BLD AUTO: 75 %
PH UR: 5.5 [PH] (ref 5–8)
PLATELET # BLD AUTO: 240 K/UL (ref 150–450)
POTASSIUM SERPL-SCNC: 3.3 MMOL/L (ref 3.5–5.1)
PROT SERPL-MCNC: 5.5 G/DL (ref 6.3–8.2)
PT BLD: 17.2 SEC (ref 9–12)
RBC UR QL: 1 /HPF (ref 0–5)
SODIUM SERPL-SCNC: 148 MMOL/L (ref 137–145)
SP GR UR: 1.02 (ref 1–1.03)
UROBILINOGEN UR QL STRIP: 4 MG/DL (ref ?–2)
WBC # BLD AUTO: 9 K/UL (ref 3.8–10.6)
WBC # UR AUTO: 1 /HPF (ref 0–5)

## 2021-05-07 PROCEDURE — 85027 COMPLETE CBC AUTOMATED: CPT

## 2021-05-07 PROCEDURE — 87075 CULTR BACTERIA EXCEPT BLOOD: CPT

## 2021-05-07 PROCEDURE — 87186 SC STD MICRODIL/AGAR DIL: CPT

## 2021-05-07 PROCEDURE — 86901 BLOOD TYPING SEROLOGIC RH(D): CPT

## 2021-05-07 PROCEDURE — 80053 COMPREHEN METABOLIC PANEL: CPT

## 2021-05-07 PROCEDURE — 71046 X-RAY EXAM CHEST 2 VIEWS: CPT

## 2021-05-07 PROCEDURE — 94660 CPAP INITIATION&MGMT: CPT

## 2021-05-07 PROCEDURE — 71045 X-RAY EXAM CHEST 1 VIEW: CPT

## 2021-05-07 PROCEDURE — 82140 ASSAY OF AMMONIA: CPT

## 2021-05-07 PROCEDURE — 85730 THROMBOPLASTIN TIME PARTIAL: CPT

## 2021-05-07 PROCEDURE — 83735 ASSAY OF MAGNESIUM: CPT

## 2021-05-07 PROCEDURE — 87205 SMEAR GRAM STAIN: CPT

## 2021-05-07 PROCEDURE — 99285 EMERGENCY DEPT VISIT HI MDM: CPT

## 2021-05-07 PROCEDURE — 36600 WITHDRAWAL OF ARTERIAL BLOOD: CPT

## 2021-05-07 PROCEDURE — 93005 ELECTROCARDIOGRAM TRACING: CPT

## 2021-05-07 PROCEDURE — 36415 COLL VENOUS BLD VENIPUNCTURE: CPT

## 2021-05-07 PROCEDURE — 86900 BLOOD TYPING SEROLOGIC ABO: CPT

## 2021-05-07 PROCEDURE — 85610 PROTHROMBIN TIME: CPT

## 2021-05-07 PROCEDURE — 87070 CULTURE OTHR SPECIMN AEROBIC: CPT

## 2021-05-07 PROCEDURE — 85025 COMPLETE CBC W/AUTO DIFF WBC: CPT

## 2021-05-07 PROCEDURE — 70450 CT HEAD/BRAIN W/O DYE: CPT

## 2021-05-07 PROCEDURE — 87077 CULTURE AEROBIC IDENTIFY: CPT

## 2021-05-07 PROCEDURE — 80048 BASIC METABOLIC PNL TOTAL CA: CPT

## 2021-05-07 PROCEDURE — 81001 URINALYSIS AUTO W/SCOPE: CPT

## 2021-05-07 PROCEDURE — 84484 ASSAY OF TROPONIN QUANT: CPT

## 2021-05-07 PROCEDURE — 36410 VNPNXR 3YR/> PHY/QHP DX/THER: CPT

## 2021-05-07 PROCEDURE — 76937 US GUIDE VASCULAR ACCESS: CPT

## 2021-05-07 PROCEDURE — 86850 RBC ANTIBODY SCREEN: CPT

## 2021-05-07 PROCEDURE — 87086 URINE CULTURE/COLONY COUNT: CPT

## 2021-05-07 PROCEDURE — 83605 ASSAY OF LACTIC ACID: CPT

## 2021-05-07 PROCEDURE — 87040 BLOOD CULTURE FOR BACTERIA: CPT

## 2021-05-07 PROCEDURE — 94760 N-INVAS EAR/PLS OXIMETRY 1: CPT

## 2021-05-07 PROCEDURE — 87636 SARSCOV2 & INF A&B AMP PRB: CPT

## 2021-05-07 PROCEDURE — 80306 DRUG TEST PRSMV INSTRMNT: CPT

## 2021-05-07 PROCEDURE — 82805 BLOOD GASES W/O2 SATURATION: CPT

## 2021-05-07 RX ADMIN — INSULIN ASPART SCH: 100 INJECTION, SOLUTION INTRAVENOUS; SUBCUTANEOUS at 17:40

## 2021-05-07 RX ADMIN — METOPROLOL SUCCINATE SCH MG: 50 TABLET, EXTENDED RELEASE ORAL at 21:18

## 2021-05-07 RX ADMIN — INSULIN ASPART SCH: 100 INJECTION, SOLUTION INTRAVENOUS; SUBCUTANEOUS at 21:18

## 2021-05-07 RX ADMIN — FINASTERIDE SCH MG: 5 TABLET, FILM COATED ORAL at 21:18

## 2021-05-07 NOTE — XR
EXAMINATION TYPE: XR chest 1V portable

 

DATE OF EXAM: 5/7/2021

 

CLINICAL HISTORY: altered mental status.

 

TECHNIQUE:  Portable frontal view of the chest.

 

COMPARISON: 4/24/2021 

 

FINDINGS: Left-sided AICD redemonstrated. Cardiomegaly. Pulmonary vasculature is normal. There is no 
focal air space opacity.  No pleural effusion. No pneumothorax seen. No acute displaced osseous fract
ure.

 

 

IMPRESSION:  

Redemonstrated cardiomegaly. No acute cardiopulmonary process.

## 2021-05-07 NOTE — CT
EXAMINATION TYPE: CT brain wo con

 

DATE OF EXAM: 5/7/2021

 

COMPARISON: 4/22/2021

 

INDICATION: Unconscious last night, altered mental status

 

DLP: 1055.4 mGycm, Automated exposure control for dose reduction was used.

 

CONTRAST: None

 

CT of the brain is performed utilizing 3 mm thick sections through the posterior fossa and 3 mm thick
 sections through the remaining calvarium.  Study is performed within 24 hours of arrival to the hosp
ital. 

 

No abnormal hyperdensity is present to suggest an acute intracranial hemorrhage.

No mass lesion is evident.

No acute infarcts are evident.

Ventricles and sulci are prominent for the patient age.  

Paranasal sinuses and mastoid air cells within the field-of-view are clear. There is a calcific densi
ty remaining present in the posterior right occiput

 

IMPRESSIONS:

1.   Atrophy.

2. No acute intracranial process is radiographically evident

## 2021-05-07 NOTE — P.HPIM
History of Present Illness


H&P Date: 21


Chief Complaint: Decreased responsiveness





History of presenting complaint:


This is a 76-year-old patient follows with Dr. Ramires.  Resident of [facility].  Chronic stable medical conditions include atrial fibrillation, 

CHF EF of 2025%, diabetes, GERD, hypertension, hyperlipidemia, osteoarthritis, 

BPH, peripheral neuropathy, chronic sacral ulcer, anybody dementia, hypothyroid.

 On April 15 patient had a permanent pacemaker placed.  Patient up to that had 

been using a walker.  Subsequent that patient has not really walked.  Patient is

able to answer some simple questions.  Most of the history is obtained by the 

wife at the bedside.  Patient's appetite has been okay.  Patient's had an 

episode where he just becomes unresponsive.  He's had 2 prior episodes of the 

same.  Neurological workup has been negative for the same.  On this occasion 

again an episode of unconsciousness.  EMS was called out.  Patient breathing was

14-16.  Skin was warm.  Blood glucose was 81.  Patient had no response to 

sternal rub.  Telemetry showed paced rhythm.  Pulse ox on 3 inches was close to 

100% patient did not respond to Narcan.  Patient last seen well was the previous

night.  Patient did, not in the ER.





Review of systems:


GEN.:  Tired


EYES: None


HEENT: None


NECK: None


RESPIRATORY: None


CARDIOVASCULAR: None


GASTROINTESTINAL: Some incontinence


GENITOURINARY: Incontinence


MUSCULOSKELETAL: Arthritic pain


LYMPHATICS: None


HEMATOLOGICAL: None  


PSYCHIATRY: None


NEUROLOGICAL: Forgetful, has not walked for about 3 weeks





Past medical history to include:


atrial fibrillation, CHF EF of 2025%, diabetes, GERD, hypertension, 

hyperlipidemia, osteoarthritis, BPH, peripheral neuropathy, chronic sacral 

ulcer, anybody dementia, hypothyroid.  On April 15 patient had a permanent 

pacemaker placed





Social history:


Resident of [facility] on.  Due to weakness does not participate in

physical therapy.  Patient smoked from  through .  Alcohol rarely.  [place]

ied.





Family history:


Reviewed, noncontributory to presentation





Physical examination:


VITAL SIGNS: 98.8, 82, 18, 116/66, 97% on 4 L


GENERAL: BMI 32.3, laying in bed tired but arousable.


EYES: Pupils equal.  Conjunctiva normal.


HEENT: External appearance of nose and ears normal, oral cavity grossly normal.


NECK: JVD not raised; masses not palpable.


HEART: First and second heart sounds are normal;  no edema.  


LUNGS:[ Respiratory rate normal; decreased breath sounds.  


ABDOMEN: Soft,  nontender, liver spleen not palpable, no masses palpable.  


PSYCH: Lethargic but answers some questions.  No studies in the hospital.  He 

thinks his 2002.   about the seasonl.  


NEUROLOGICAL: [Cranial nerves grossly intact; no facial asymmetry, able to lift 

both his arms.  Able to move both legs and barely lift off the bed.


LYMPHATICS: No lymph nodes palpable in the axilla and neck





INVESTIGATIONS, reviewed in the clinical context:


WBC 9 hemoglobin 11.8 platelets 240 sodium 148 potassium 3.3 bun 34 creatinine 

1.25


UA-hyaline casts 17


Urine drug screen positive for tricyclic antidepressant


Influenza type A, diabetic, RSV, COVID 19 [PCR]: Negative


EKG tracing personally reviewed by me-ventricular paced rhythm.


CT surgeon group brain without contrast: Showing atrophy


Chest x-ray film personally seen by me: Enlarged heart.  No obvious infiltrate





Previous testing:





Carotid Doppler: No significant stenosis


EEG: Negative for epileptiform activity


2-D echocardiogram: EF 20-25%, left ventricle moderately dilated.  Moderate 

tricuspid regurgitation, moderate to severe pulmonary hypertension





Assessment and plan:





-Episode of unconsciousness.  No seizure activity reported.  This is patient's 

at least third episode.  Prior workup including carotid Doppler, EEG, computed 

tomography scan of brain old negative.  Patient is awake again.  Wonder if this 

abnormality of the reticular activating system.  Cutback Seroquel to 25 mg daily

 at bedtime





-Persistent atrial fibrillation


Patient has a telemetry.  AICD.  Pacemaker.  Toprol-XL





-Chronic congestive heart failure from systolic dysfunction EF 20-25%


Continue with Aldactone, Zestril, Toprol-XL





-Moderate to severe tricuspid and mitral regurgitation


Follow clinically





-Secondary moderate to severe pulmonary hypertension secondary to CHF and COPD


Follow clinically





-COPD in a previous smoker


Bronchodilators





-Major cognitive impairment from Lewy body dementia





-Diabetes mellitus type 2


Follow Accu-Cheks.  Hold Glucotrol as Accu-Cheks are running on the lower side.





-GERD


On Pepcid





-Essential hypertension


Continue with Zestril





-Primary osteoarthritis multiple joints bilaterally


Use pain medications as needed





-Diabetic peripheral neuropathy





-Chronic sacral decubitus ulcer


Consult ID





-Acute on chronic medical debility.  Patient was using a walker up to April 15. 

 Now not able to walk.  PTOT





-Persistent atrial fibrillation rate controlled


Patient has AICD.





-Full code





Patient be kept off any sedating medications.  On telemetry.  Neuro checks.  

Gentle hydration as patient sodium is up.  Other home medications and resume.  

Fall precautions.  All feeding with assistance.





Past Medical History


Past Medical History: Atrial Fibrillation, Heart Failure, Dementia, Diabetes 

Mellitus, Eye Disorder, GERD/Reflux, Hyperlipidemia, Hypertension, Memory 

Impairment, Myocardial Infarction (MI), Musculoskeletal Disorder, Neurologic 

Disorder, Prostate Disorder, Syncope, Thyroid Disorder


Additional Past Medical History / Comment(s): Pt recently admitted to [facility] on  with acute encephalopathy/AMS/hypoxia.  Other hx:  NIDDM type II, 

neuropathy bilateral feet, chronic sacral ulcer, ischemic cardiomyopathy, Lewy 

body dementia, parkinson with hallucinations,  guillian barre, past large 

bullous diabetic snehal fluid filled sac R leg which burst/resolved, BPH, 

constipation, incontinence, vitamin deficiency, tinnitis, hypothyroid.


Last Myocardial Infarction Date:: 


History of Any Multi-Drug Resistant Organisms: None Reported


Past Surgical History: AICD, Heart Catheterization, Pacemaker


Additional Past Surgical History / Comment(s):  PTCA, TEEs, cardioversions, 

AICD  originally and upgraded  to biV, bilateral cataract removals, 

colonoscopy.


Past Anesthesia/Blood Transfusion Reactions: No Reported Reaction


Date of Last Stent Placement:: 


Type of Cardiac Device: Permanent Pacemaker, AICD


Device Placement Date:: original /upgrade 2021


Past Psychological History: Anxiety


Additional Psychological History / Comment(s): Pt recently placed in [facility] d/t weakness.  Pt states he has not been able to participate with 

physical therapy d/t weakness.


Smoking Status: Former smoker


Past Alcohol Use History: Rare


Additional Past Alcohol Use History / Comment(s): Pt started smoking in  and

 quit in 


Past Drug Use History: None Reported





- Past Family History


  ** Mother


Additional Family Medical History / Comment(s): SEVERE LEG EDEMA





  ** Father


Additional Family Medical History / Comment(s): Father  during a heart 

procedure.





Medications and Allergies


                                Home Medications











 Medication  Instructions  Recorded  Confirmed  Type


 


Famotidine [Pepcid] 20 mg PO DAILY@0800 18 History


 


Finasteride [Proscar] 5 mg PO HS 18 History


 


Furosemide [Lasix] 40 mg PO DAILY@0800 18 History


 


Warfarin [Coumadin] 2.5 mg PO DAILY@1600 11/15/18 05/07/21 History


 


QUEtiapine [SEROquel] 50 mg PO HS 21 History


 


rOPINIRole HCL [Requip] 0.25 mg PO HS 21 History


 


Levothyroxine Sodium 25 mcg PO HS 21 History


 


bisacodyL [Dulcolax] 5 mg PO DAILY PRN 21 History


 


Isosorbide Mononitrate ER [Imdur] 30 mg PO DAILY@0600 21 History


 


Multivitamins, Thera [Multivitamin 1 tab PO DAILY@0800 21 History





(formulary)]    


 


Spironolactone [Aldactone] 25 mg PO DAILY@0600 21 History


 


lisinopriL [Zestril] 5 mg PO HS@ #30 tab 21 Rx


 


Healthshake 1 cap PO BID@0700,1730 21 History


 


Metoprolol Succinate (ER) [Toprol 50 mg PO BID@0800,2000 21 

History





Xl]    


 


glipiZIDE [Glucotrol] 2.5 mg PO BID@0800,1600 21 History


 


traMADol HCl [Ultram] 50 mg PO Q6H PRN 21 History








                                    Allergies











Allergy/AdvReac Type Severity Reaction Status Date / Time


 


No Known Allergies Allergy   Verified 21 10:54














Physical Exam


Vitals: 


                                   Vital Signs











  Temp Pulse Pulse Resp BP BP Pulse Ox


 


 21 15:47  98.3 F   74  16   108/50  94 L


 


 21 15:22  98.0 F      


 


 21 15:09   74   20  121/70   100


 


 21 13:00  97.8 F  70   20  126/80   98


 


 21 10:33  98.8 F  82   18  116/66   97








                                Intake and Output











 21





 06:59 14:59 22:59


 


Other:   


 


  # Voids   1


 


  Weight  104.326 kg 108 kg














Results


CBC & Chem 7: 


                                 21 10:45





                                 21 10:45


Labs: 


                  Abnormal Lab Results - Last 24 Hours (Table)











  21 Range/Units





  10:45 10:45 10:45 


 


RBC  3.60 L    (4.30-5.90)  m/uL


 


Hgb  11.8 L    (13.0-17.5)  gm/dL


 


Hct  38.6 L    (39.0-53.0)  %


 


MCV  107.2 H    (80.0-100.0)  fL


 


MCHC  30.6 L    (31.0-37.0)  g/dL


 


RDW  16.2 H    (11.5-15.5)  %


 


Macrocytosis  Marked A    


 


PT   17.2 H   (9.0-12.0)  sec


 


INR   1.7 H   (<1.2)  


 


Sodium     (137-145)  mmol/L


 


Potassium     (3.5-5.1)  mmol/L


 


Carbon Dioxide     (22-30)  mmol/L


 


BUN     (9-20)  mg/dL


 


Calcium     (8.4-10.2)  mg/dL


 


Total Protein     (6.3-8.2)  g/dL


 


Albumin     (3.5-5.0)  g/dL


 


Urine Protein    Trace H  (Negative)  


 


Urine Blood    Trace H  (Negative)  


 


Urine Bacteria    Rare H  (None)  /hpf


 


Hyaline Casts    17 H  (0-2)  /lpf


 


Urine Mucus    Rare H  (None)  /hpf


 


U Tricyclic Antidepress    Detected H  (NotDetected)  














  21 Range/Units





  10:45 


 


RBC   (4.30-5.90)  m/uL


 


Hgb   (13.0-17.5)  gm/dL


 


Hct   (39.0-53.0)  %


 


MCV   (80.0-100.0)  fL


 


MCHC   (31.0-37.0)  g/dL


 


RDW   (11.5-15.5)  %


 


Macrocytosis   


 


PT   (9.0-12.0)  sec


 


INR   (<1.2)  


 


Sodium  148 H  (137-145)  mmol/L


 


Potassium  3.3 L  (3.5-5.1)  mmol/L


 


Carbon Dioxide  39 H  (22-30)  mmol/L


 


BUN  34 H  (9-20)  mg/dL


 


Calcium  8.2 L  (8.4-10.2)  mg/dL


 


Total Protein  5.5 L  (6.3-8.2)  g/dL


 


Albumin  2.8 L  (3.5-5.0)  g/dL


 


Urine Protein   (Negative)  


 


Urine Blood   (Negative)  


 


Urine Bacteria   (None)  /hpf


 


Hyaline Casts   (0-2)  /lpf


 


Urine Mucus   (None)  /hpf


 


U Tricyclic Antidepress   (NotDetected)  














Thrombosis Risk Factor Assmnt





- Choose All That Apply


Any of the Below Risk Factors Present?: Yes


Each Factor Represents 1 point: Medical pt on bed rest, Obesity (BMI >25)


Other Risk Factors: Yes


Each Risk Factor Represents 2 Points: Patient confined to bed


Each Risk Factor Represents 3 Points: Age 75 years or older


Other congenital or acquired thrombophilia - If yes, enter type in comment: No


Thrombosis Risk Factor Assessment Total Risk Factor Score: 7


Thrombosis Risk Factor Assessment Level: High Risk

## 2021-05-08 LAB
ANION GAP SERPL CALC-SCNC: 6 MMOL/L
BUN SERPL-SCNC: 32 MG/DL (ref 9–20)
CALCIUM SPEC-MCNC: 7.9 MG/DL (ref 8.4–10.2)
CHLORIDE SERPL-SCNC: 104 MMOL/L (ref 98–107)
CO2 SERPL-SCNC: 38 MMOL/L (ref 22–30)
ERYTHROCYTE [DISTWIDTH] IN BLOOD BY AUTOMATED COUNT: 3.34 M/UL (ref 4.3–5.9)
ERYTHROCYTE [DISTWIDTH] IN BLOOD: 15.9 % (ref 11.5–15.5)
GLUCOSE BLD-MCNC: 100 MG/DL (ref 75–99)
GLUCOSE BLD-MCNC: 71 MG/DL (ref 75–99)
GLUCOSE BLD-MCNC: 86 MG/DL (ref 75–99)
GLUCOSE BLD-MCNC: 90 MG/DL (ref 75–99)
GLUCOSE SERPL-MCNC: 96 MG/DL (ref 74–99)
HCT VFR BLD AUTO: 35.8 % (ref 39–53)
HGB BLD-MCNC: 11.6 GM/DL (ref 13–17.5)
INR PPP: 1.75 (ref 0.9–1.11)
MCH RBC QN AUTO: 34.8 PG (ref 25–35)
MCHC RBC AUTO-ENTMCNC: 32.4 G/DL (ref 31–37)
MCV RBC AUTO: 107.4 FL (ref 80–100)
PLATELET # BLD AUTO: 215 K/UL (ref 150–450)
POTASSIUM SERPL-SCNC: 3.1 MMOL/L (ref 3.5–5.1)
PT BLD: 18.4 SEC (ref 9.9–11.9)
SODIUM SERPL-SCNC: 148 MMOL/L (ref 137–145)
WBC # BLD AUTO: 8.1 K/UL (ref 3.8–10.6)

## 2021-05-08 RX ADMIN — ISOSORBIDE MONONITRATE SCH MG: 30 TABLET, EXTENDED RELEASE ORAL at 05:36

## 2021-05-08 RX ADMIN — METOPROLOL SUCCINATE SCH MG: 50 TABLET, EXTENDED RELEASE ORAL at 07:58

## 2021-05-08 RX ADMIN — LEVOTHYROXINE SODIUM SCH MCG: 25 TABLET ORAL at 05:36

## 2021-05-08 RX ADMIN — FINASTERIDE SCH MG: 5 TABLET, FILM COATED ORAL at 20:49

## 2021-05-08 RX ADMIN — INSULIN ASPART SCH: 100 INJECTION, SOLUTION INTRAVENOUS; SUBCUTANEOUS at 07:44

## 2021-05-08 RX ADMIN — THERA TABS SCH EACH: TAB at 07:56

## 2021-05-08 RX ADMIN — SPIRONOLACTONE SCH MG: 25 TABLET, FILM COATED ORAL at 07:58

## 2021-05-08 RX ADMIN — METOPROLOL SUCCINATE SCH MG: 50 TABLET, EXTENDED RELEASE ORAL at 20:49

## 2021-05-08 RX ADMIN — INSULIN ASPART SCH: 100 INJECTION, SOLUTION INTRAVENOUS; SUBCUTANEOUS at 17:11

## 2021-05-08 RX ADMIN — INSULIN ASPART SCH: 100 INJECTION, SOLUTION INTRAVENOUS; SUBCUTANEOUS at 20:49

## 2021-05-08 RX ADMIN — COLLAGENASE SANTYL SCH APPLIC: 250 OINTMENT TOPICAL at 14:35

## 2021-05-08 RX ADMIN — FAMOTIDINE SCH MG: 20 TABLET, FILM COATED ORAL at 07:56

## 2021-05-08 RX ADMIN — INSULIN ASPART SCH: 100 INJECTION, SOLUTION INTRAVENOUS; SUBCUTANEOUS at 11:51

## 2021-05-08 NOTE — CONS
CONSULTATION



DATE OF SERVICE:

05/08/2021



REASON FOR CONSULTATION:

Sacral pressure ulcer.



HISTORY OF PRESENT ILLNESS:

The patient is a 76-year-old  male with a past medical history significant for

multiple comorbidities including dementia, diabetes, atrial fibrillation. The patient

is a resident of a local nursing home.  The patient has been brought into the hospital

for evaluation of episode of unresponsiveness that happened the night  the patient was

brought into the hospital.  The patient was sent to the ER for further evaluation.  On

arrival to the ER, the patient apparently was awake and talking to the staff, however,

slightly confused and could not provide any reliable information.  On presentation to

the hospital, the patient has been afebrile and no fever has been recorded since he was

admitted to the hospital.  The patient did have a normal white count with no left

shift.  BUN was slightly elevated.  Creatinine was normal.  The patient UA was

negative.  Urine was positive for tricyclic. Corona PCR was negative.  The patient has

been admitted to the hospital for workup for the episode of responsiveness.  Chest x-

ray was negative for any pneumonia.  The patient was noticed to have a sacral pressure

ulcer which apparently the patient has had for a while now.  The patient has been

mostly bedridden and unable to ambulate.  The patient was not exactly sure to determine

exactly when started having a problem with a pressure ulcer or any treatment provided

specifically for it.  Most of the information has been obtained from review of the

chart as the patient himself is not a very good historian.



REVIEW OF SYSTEMS:

Could not be reliably obtained. The positive points have been mentioned in HPI.



PAST MEDICAL HISTORY:

Diabetes mellitus, hypertension, hyperlipidemia, hypothyroidism, prostate disorder and

dementia, Guillain Davilla _____.



PAST SURGERY HISTORY:

AICD placement, heart catheterization; _____left cataract removal.



SOCIAL HISTORY:

Remote history of smoking.  No drinking or drug use.  Currently a nursing home

resident.



FAMILY HISTORY:

No pertinent findings noticed.



ALLERGIES:

No known drug allergies.



MEDICATIONS:

The patient is currently on Dulcolax, Pepcid, Proscar, Glucotrol, NovoLog, Imdur,

Synthroid, Zestril, Toprol-XL, Coumadin, _____ Theragran, Seroquel, Requip, IV fluid,

Ultram and Aldactone.



PHYSICAL EXAMINATION:

Blood pressure is 119/72 with a pulse of 80, temperature 97.9.  He is 92% on 2 L nasal

cannula.

General description is an elderly male lying in bed in no distress.  No tachypnea or

accessory muscles of respiration use.

HEENT:  Examination shows slight pallor.  No scleral icterus.  Oral mucous membranes

dry.

NECK: Trachea central. No thyromegaly.  LUNGS: Unlabored breathing, clear to

auscultation anteriorly.

HEART S1, S2.  Regular rate and rhythm.

ABDOMEN:  Soft, no tenderness.  No guarding. No rigidity.

EXTREMITIES: No edema of the feet.

Examination of the sacral area did show an unstageable pressure ulcer with slough

tissue.  However, no surrounding swelling, redness or any drainage.

NEUROLOGICALLY: Patient is awake, orientation could not be determined.  No agitation

was noticed.



LABS:

Hemoglobin is 11.6, white count 8.1.  BUN of 32, creatinine 1.11. _____ 3.1. The

patient liver enzymes are normal.  Urine is negative.



DIAGNOSTIC IMPRESSION AND PLAN:

1. Patient with sacral pressure ulcer in this patient currently bed-bound.  Pressure

    ulcer is unstageable with significant amount of slough tissue.  No surrounding

    swelling, redness or any foul-smelling drainage.  Clinical suspicion low for

    secondary bacterial infection with cellulitis with no fever or elevated white

    count.

2. Patient with an episode of unresponsiveness being evaluated by primary and

    neurology team thought to be related to Seroquel, has been cut back.



PLAN:

1. Local wound care to the sacral area wound with Santyl followed by moist dressing,

    keep the area dry and off the pressure.

2. No need for systemic antibiotic therapy.

3. We will follow his clinical condition to further adjust medication if needed.

Thank you for this consultation. Will follow this patient along with you.





MMODL / IJN: [id number] / Job#: [id number]

## 2021-05-08 NOTE — P.PN
Progress Note - Text


Progress Note Date: 05/08/21





Chief Complaint: Decreased responsiveness





History of presenting complaint:


This is a 76-year-old patient follows with Dr. Ramires.  Resident of [facility].  Chronic stable medical conditions include atrial fibrillation, 

CHF EF of 2025%, diabetes, GERD, hypertension, hyperlipidemia, osteoarthritis, 

BPH, peripheral neuropathy, chronic sacral ulcer, anybody dementia, hypothyroid.

 On April 15 patient had a permanent pacemaker placed.  Patient up to that had 

been using a walker.  Subsequent that patient has not really walked.  Patient is

able to answer some simple questions.  Most of the history is obtained by the 

wife at the bedside.  Patient's appetite has been okay.  Patient's had an 

episode where he just becomes unresponsive.  He's had 2 prior episodes of the 

same.  Neurological workup has been negative for the same.  On this occasion 

again an episode of unconsciousness.  EMS was called out.  Patient breathing was

14-16.  Skin was warm.  Blood glucose was 81.  Patient had no response to 

sternal rub.  Telemetry showed paced rhythm.  Pulse ox on 3 inches was close to 

100% patient did not respond to Narcan.  Patient last seen well was the previous

night.  Patient did, not in the ER.  Elevated sodium/dehydration.  Gentle 

hydration


Admitted with possible abnormality of SYD.  Dose of Seroquel cutback. 


Today: Laying in bed.  A bit more awake.  Oral intake good.  Telemetry paced 

rhythm.





Review of systems: Was done for constitutional, cardiovascular, GI, pulmonary. 

relevant finding as above








Past medical history to include:


atrial fibrillation, CHF EF of 2025%, diabetes, GERD, hypertension, 

hyperlipidemia, osteoarthritis, BPH, peripheral neuropathy, chronic sacral 

ulcer, anybody dementia, hypothyroid.  On April 15 patient had a permanent 

pacemaker placed





Social history:


Resident of [facility] on.  Due to weakness does not participate in

physical therapy.  Patient smoked from 1964 through 1988.  Alcohol rarely.  

.





Family history:


Reviewed, noncontributory to presentation





Physical examination:


VITAL SIGNS: 98.3, 85, 16, 131/73, 97% room air


GENERAL: BMI 32.3, laying in bed, tired, but more awake


EYES: Pupils equal.  Conjunctiva normal.


NECK: JVD not raised; masses not palpable.


HEART: First and second heart sounds are normal;  no edema.  


LUNGS:[ Respiratory rate normal; decreased breath sounds.  


ABDOMEN: Soft,  nontender, liver spleen not palpable, no masses palpable.  


PSYCH: Lethargic but answers some questions.  No studies in the hospital.  He 

thinks his 2002.   about the seasonl.  


NEUROLOGICAL: [Cranial nerves grossly intact; no facial asymmetry, able to lift 

both his arms.  Able to move both legs and barely lift off the bed.








INVESTIGATIONS, reviewed in the clinical context:


May 8: Sodium 148 potassium 3.1 creatinine 1.11


WBC 9 hemoglobin 11.8 platelets 240 sodium 148 potassium 3.3 bun 34 creatinine 

1.25


UA-hyaline casts 17


Urine drug screen positive for tricyclic antidepressant


Influenza type A, diabetic, RSV, COVID 19 [PCR]: Negative


EKG tracing personally reviewed by me-ventricular paced rhythm.


CT surgeon group brain without contrast: Showing atrophy


Chest x-ray film personally seen by me: Enlarged heart.  No obvious infiltrate





Previous testing:


April /2021


Carotid Doppler: No significant stenosis


EEG: Negative for epileptiform activity


2-D echocardiogram: EF 20-25%, left ventricle moderately dilated.  Moderate 

tricuspid regurgitation, moderate to severe pulmonary hypertension





Assessment and plan:





-Episode of unconsciousness.  No seizure activity reported.  This is patient's 

at least third episode.  Prior workup including carotid Doppler, EEG, computed 

tomography scan of brain old negative.  Patient is awake again.  Possible 

abnormality of the reticular activating system.  Cutback Seroquel to 25 mg daily

 at bedtime





-Persistent atrial fibrillation


Patient has a telemetry.  AICD.  Pacemaker.  Toprol-XL





-Chronic congestive heart failure from systolic dysfunction EF 20-25%


Continue with Aldactone, Zestril, Toprol-XL.  Lasix held





-Moderate to severe tricuspid and mitral regurgitation


Follow clinically





-Secondary moderate to severe pulmonary hypertension secondary to CHF and COPD


Follow clinically





-COPD in a previous smoker


Bronchodilators





-Major cognitive impairment from Lewy body dementia





-Diabetes mellitus type 2


Follow Accu-Cheks.  Hold Glucotrol as Accu-Cheks are running on the lower side.





-GERD


On Pepcid





-Essential hypertension


Continue with Zestril





-Primary osteoarthritis multiple joints bilaterally


Use pain medications as needed





-Diabetic peripheral neuropathy





-Chronic sacral decubitus ulcer


Consult ID





-Acute on chronic medical debility.  Patient was using a walker up to April 15. 

 Now not able to walk.  PTOT





-Persistent atrial fibrillation rate controlled


Patient has AICD.





-Full code





-Hypernatremia, from free water deficit


Gentle hydration.  Continue to hold Lasix.








Saline 50 mL an hour for a total of 500 mL.  Repeat labs

## 2021-05-08 NOTE — P.CNNES
History of Present Illness


Consult date: 21


History of Present Illness: 





The patient is a 76-year-old  male who is seen in neurologic 

consultation on May 8, 2021, via teleneurology.


History is obtained from the chart as well as from the patient's wife who is at 

the bedside.  The patient is unable to provide a detailed history.


The patient is being seen because of an episode of unresponsiveness.  Patient's 

wife reports that this is his third episode of unresponsiveness.  The first 

episode apparently occurred following surgery for placement of pacemaker and 

AICD.  The next episode occurred in April of this year.  His current and third 

episode occurred at [facility], when patient deciding.  He 

reportedly was found with his head down on the desk, eyes closed, mouth open and

unresponsive to sternal rub.  According to the patient's wife, when the patient 

has had these episodes of unresponsiveness, they have lasted for hours.  She 

does note on this most recent occasion, she was called by staff at the facility,

at approximately 10 PM.  She immediately went into the emergency department.  

When her  arrived at the ER, at 11:15, he was awake and talking.  The 

patient's wife feels he was at his baseline.  She does report that in previous 

episodes the patient does seem to react to passive attempts to open his eyes.


In reviewing the records, the patient has been worked up by neurology, in the 

past.  Previous EEG was negative for seizure.  Carotid Doppler and CT scan of 

the brain have not provided any useful information as to the etiology of these 

episodes.


The patient's wife reports that he is living in [facility] because he is unable 

to ambulate.  This inabilities to ambulate began following his surgery for 

pacemaker and AICD placement.  As far as I can tell, and reviewing the chart, 

the inability to ambulate has not been fully evaluated.  It appears that the 

patient has not been receiving physical therapy at his current location.  It 

also appears, based on the fact that he has a sacral decubitus ulcer, that he 

has not been getting out of his wheelchair or or out of bed on a regular basis. 

It is likely that his aforementioned weakness has been worsened by lack of 

exercise.





The patient's medications are reviewed.  Apparently with his Lewy body dementia,

the patient has hallucinations.  This is The reason he is taking the Seroquel.  

In addition to Seroquel, the patient is taking 3 antihypertensive medications.








Past Medical History


Past Medical History: Atrial Fibrillation, Heart Failure, Dementia, Diabetes 

Mellitus, Eye Disorder, GERD/Reflux, Hyperlipidemia, Hypertension, Memory 

Impairment, Myocardial Infarction (MI), Musculoskeletal Disorder, Neurologic 

Disorder, Prostate Disorder, Syncope, Thyroid Disorder


Additional Past Medical History / Comment(s): Pt recently admitted to [facility] on 

21 with acute encephalopathy/AMS/hypoxia.  Other hx:  NIDDM type II, 

neuropathy bilateral feet, chronic sacral ulcer, ischemic cardiomyopathy, Lewy 

body dementia, parkinson with hallucinations,  guillian barre, past large 

bullous diabetic snehal fluid filled sac R leg which burst/resolved, BPH, 

constipation, incontinence, vitamin deficiency, tinnitis, hypothyroid.


Last Myocardial Infarction Date:: 


History of Any Multi-Drug Resistant Organisms: None Reported


Past Surgical History: AICD, Heart Catheterization, Pacemaker


Additional Past Surgical History / Comment(s):  PTCA, TEEs, cardioversions, 

AICD  originally and upgraded  to biV, bilateral cataract removals, 

colonoscopy.


Past Anesthesia/Blood Transfusion Reactions: No Reported Reaction


Date of Last Stent Placement:: 


Type of Cardiac Device: Permanent Pacemaker, AICD


Device Placement Date:: original /upgrade 2021


Past Psychological History: Anxiety


Additional Psychological History / Comment(s): Pt recently placed in [facility] d/t weakness.  Pt states he has not been able to participate with 

physical therapy d/t weakness.


Smoking Status: Former smoker


Past Alcohol Use History: Rare


Additional Past Alcohol Use History / Comment(s): Pt started smoking in  and

quit in 


Past Drug Use History: None Reported





- Past Family History


  ** Mother


Additional Family Medical History / Comment(s): SEVERE LEG EDEMA





  ** Father


Additional Family Medical History / Comment(s): Father  during a heart 

procedure.





Medications and Allergies


                                Home Medications











 Medication  Instructions  Recorded  Confirmed  Type


 


Famotidine [Pepcid] 20 mg PO DAILY@0800 18 History


 


Finasteride [Proscar] 5 mg PO HS 18 History


 


Furosemide [Lasix] 40 mg PO DAILY@0800 18 History


 


Warfarin [Coumadin] 2.5 mg PO DAILY@1600 11/15/18 05/07/21 History


 


QUEtiapine [SEROquel] 50 mg PO HS 21 History


 


rOPINIRole HCL [Requip] 0.25 mg PO HS 21 History


 


Levothyroxine Sodium 25 mcg PO HS 21 History


 


bisacodyL [Dulcolax] 5 mg PO DAILY PRN 21 History


 


Isosorbide Mononitrate ER [Imdur] 30 mg PO DAILY@0600 21 History


 


Multivitamins, Thera [Multivitamin 1 tab PO DAILY@0800 21 History





(formulary)]    


 


Spironolactone [Aldactone] 25 mg PO DAILY@0600 21 History


 


lisinopriL [Zestril] 5 mg PO HS@ #30 tab 21 Rx


 


Healthshake 1 cap PO BID@0700,1730 21 History


 


Metoprolol Succinate (ER) [Toprol 50 mg PO BID@0800,2000 21 

History





Xl]    


 


glipiZIDE [Glucotrol] 2.5 mg PO BID@0800,1600 21 History


 


traMADol HCl [Ultram] 50 mg PO Q6H PRN 21 History








                                    Allergies











Allergy/AdvReac Type Severity Reaction Status Date / Time


 


No Known Allergies Allergy   Verified 21 10:54














Physical Examination





- Vital Signs


Vital Signs: 


                                   Vital Signs











  Temp Pulse Resp BP Pulse Ox


 


 21 14:00  97.9 F  80  20  119/72  92 L


 


 21 08:45      92 L


 


 21 07:45  98.6 F  76  18  131/79  95


 


 21 00:12  98.0 F  84  14  122/69  92 L


 


 21 19:15  97.6 F  79  14  122/74  92 L








                                Intake and Output











 21





 06:59 14:59 22:59


 


Output Total  300 


 


Balance  -300 


 


Output:   


 


  Urine  300 


 


Other:   


 


  Voiding Method  Diaper 


 


  Weight  108 kg 














Gen.: The patient is reclining in the bed.  He is feeling somewhat poorly at 

this moment, secondary to hypoglycemia.  Blood sugar was found to be 71 on 

routine testing prior to dinner.  The patient is obese


HEENT: Head is atraumatic, normocephalic.  Fundus not visualized.  There is no 

scleral icterus.  Mucous membranes are moist.


Neck: Supple without carotid bruits


Heart: Regular rate and rhythm


Lungs: There is a left-sided expiratory wheeze


Neurological examination


Mental status: The patient is awake and alert.  His speech is clear.  He is 

oriented to his name, date of birth and location.  There is no right left 

confusion.  The patient is able to follow two-step commands.  Patient is unable 

to report the current year or the current president of [place].  The 

patient is also not oriented to the current month.  He quickly becomes 

frustrated with his inability to answer these questions.


Cranial nerves: Pupils are equal at 3 mm and reactive to light.  Visual field 

testing is inconsistent.  There is a mild right exotropia.  Extraocular 

movements are intact.  There is no nystagmus.  Facial sensation is intact.  

There is no facial asymmetry.  Hearing is grossly intact.  Uvula and palate are 

midline.  Shoulder shrug is diminished on the left.  Tongue protrudes midline.


Motor: Upper extremity strength is 5/5.  Bilateral hip flexor strength 3/5.  

Ankle dorsi and plantar flexors 3/5.


Coordination: There is a bilateral upper extremity intention tremor present on 

finger-nose-finger testing.


Sensation: Grossly intact to light touch throughout.


Deep tendon reflexes: 1-2+/4+ throughout.








Results





- Laboratory Findings


CBC and BMP: 


                                 21 06:49





                                 21 06:49


Abnormal Lab Findings: 


                                  Abnormal Labs











  21





  10:45 10:45 10:45


 


RBC  3.60 L  


 


Hgb  11.8 L  


 


Hct  38.6 L  


 


MCV  107.2 H  


 


MCHC  30.6 L  


 


RDW  16.2 H  


 


Macrocytosis  Marked A  


 


PT   17.2 H 


 


INR   1.7 H 


 


Sodium   


 


Potassium   


 


Carbon Dioxide   


 


BUN   


 


POC Glucose (mg/dL)   


 


Calcium   


 


Total Protein   


 


Albumin   


 


Urine Protein    Trace H


 


Urine Blood    Trace H


 


Urine Bacteria    Rare H


 


Hyaline Casts    17 H


 


Urine Mucus    Rare H


 


U Tricyclic Antidepress    Detected H














  21





  10:45 06:49 06:49


 


RBC    3.34 L


 


Hgb    11.6 L


 


Hct    35.8 L


 


MCV    107.4 H


 


MCHC   


 


RDW    15.9 H


 


Macrocytosis    Marked A


 


PT   18.4 H 


 


INR   1.75 H 


 


Sodium  148 H  


 


Potassium  3.3 L  


 


Carbon Dioxide  39 H  


 


BUN  34 H  


 


POC Glucose (mg/dL)   


 


Calcium  8.2 L  


 


Total Protein  5.5 L  


 


Albumin  2.8 L  


 


Urine Protein   


 


Urine Blood   


 


Urine Bacteria   


 


Hyaline Casts   


 


Urine Mucus   


 


U Tricyclic Antidepress   














  21





  06:49 07:06 16:15


 


RBC   


 


Hgb   


 


Hct   


 


MCV   


 


MCHC   


 


RDW   


 


Macrocytosis   


 


PT   


 


INR   


 


Sodium  148 H  


 


Potassium  3.1 L  


 


Carbon Dioxide  38 H  


 


BUN  32 H  


 


POC Glucose (mg/dL)   100 H  71 L


 


Calcium  7.9 L  


 


Total Protein   


 


Albumin   


 


Urine Protein   


 


Urine Blood   


 


Urine Bacteria   


 


Hyaline Casts   


 


Urine Mucus   


 


U Tricyclic Antidepress   














Assessment and Plan


Assessment: 





1.  Unresponsiveness/loss of consciousness without reported tonic-clonic 

movements or postictal state (this is reportedly a third episode)


2.  Acute kidney injury


3.  Hypernatremia


4.  Possible autonomic dysfunction secondary to Lewy body dementia


5.  History of Lewy body dementia with bilateral lower extremity weakness


6.  Reported sudden onset paraplegia/inability to ambulate following surgery for

 pacemaker placement


7.  Chronic atrial fibrillation


Plan: 





1.  Check orthostatic vitals


2.  Consult physical therapy for evaluation and possible treatment


3.  Consider adjustment of antihypertensive medications


4.  Agree with lowering the dose of Seroquel


Time with Patient: Greater than 30 (spent 45 minutes with patient via t

eleneurology)

## 2021-05-09 LAB
ANION GAP SERPL CALC-SCNC: 2 MMOL/L
BUN SERPL-SCNC: 27 MG/DL (ref 9–20)
CALCIUM SPEC-MCNC: 7.8 MG/DL (ref 8.4–10.2)
CHLORIDE SERPL-SCNC: 106 MMOL/L (ref 98–107)
CO2 SERPL-SCNC: 38 MMOL/L (ref 22–30)
GLUCOSE BLD-MCNC: 100 MG/DL (ref 75–99)
GLUCOSE BLD-MCNC: 127 MG/DL (ref 75–99)
GLUCOSE BLD-MCNC: 129 MG/DL (ref 75–99)
GLUCOSE BLD-MCNC: 146 MG/DL (ref 75–99)
GLUCOSE BLD-MCNC: 63 MG/DL (ref 75–99)
GLUCOSE BLD-MCNC: 66 MG/DL (ref 75–99)
GLUCOSE BLD-MCNC: 72 MG/DL (ref 75–99)
GLUCOSE SERPL-MCNC: 66 MG/DL (ref 74–99)
INR PPP: 2.7 (ref ?–1.2)
POTASSIUM SERPL-SCNC: 3.6 MMOL/L (ref 3.5–5.1)
PT BLD: 25.7 SEC (ref 9–12)
SODIUM SERPL-SCNC: 146 MMOL/L (ref 137–145)

## 2021-05-09 RX ADMIN — COLLAGENASE SANTYL SCH APPLIC: 250 OINTMENT TOPICAL at 14:37

## 2021-05-09 RX ADMIN — METOPROLOL SUCCINATE SCH MG: 50 TABLET, EXTENDED RELEASE ORAL at 07:43

## 2021-05-09 RX ADMIN — THERA TABS SCH EACH: TAB at 07:43

## 2021-05-09 RX ADMIN — INSULIN ASPART SCH: 100 INJECTION, SOLUTION INTRAVENOUS; SUBCUTANEOUS at 22:05

## 2021-05-09 RX ADMIN — LEVOTHYROXINE SODIUM SCH MCG: 25 TABLET ORAL at 05:59

## 2021-05-09 RX ADMIN — METOPROLOL SUCCINATE SCH: 50 TABLET, EXTENDED RELEASE ORAL at 22:05

## 2021-05-09 RX ADMIN — ISOSORBIDE MONONITRATE SCH MG: 30 TABLET, EXTENDED RELEASE ORAL at 05:59

## 2021-05-09 RX ADMIN — FAMOTIDINE SCH MG: 20 TABLET, FILM COATED ORAL at 07:44

## 2021-05-09 RX ADMIN — FINASTERIDE SCH: 5 TABLET, FILM COATED ORAL at 23:34

## 2021-05-09 RX ADMIN — SPIRONOLACTONE SCH MG: 25 TABLET, FILM COATED ORAL at 07:43

## 2021-05-09 RX ADMIN — INSULIN ASPART SCH: 100 INJECTION, SOLUTION INTRAVENOUS; SUBCUTANEOUS at 07:35

## 2021-05-09 RX ADMIN — INSULIN ASPART SCH: 100 INJECTION, SOLUTION INTRAVENOUS; SUBCUTANEOUS at 17:30

## 2021-05-09 RX ADMIN — INSULIN ASPART SCH: 100 INJECTION, SOLUTION INTRAVENOUS; SUBCUTANEOUS at 12:11

## 2021-05-09 NOTE — XR
EXAMINATION TYPE: XR chest 2V

 

DATE OF EXAM: 5/9/2021

 

COMPARISON: 5/7/2021

 

INDICATION: Wheezing

 

TECHNIQUE:  Frontal and lateral views of the chest are obtained.

 

FINDINGS:  

The heart size is enlarged.  

The pulmonary vasculature is normal.

No suspicious focal consolidation is evident. Pacemaker overlies left chest..

 

IMPRESSION:  

1. No acute pulmonary process.

2. Cardiomegaly

## 2021-05-09 NOTE — PN
PROGRESS NOTE



DATE OF SERVICE:

05/09/2021



REASON FOR FOLLOWUP:

Sacral pressure ulcer.



INTERVAL HISTORY:

The patient is currently afebrile.  The patient is breathing comfortably.  The 
patient

is slightly more awake, alert.  When asked  specifically denies having any chest
pain,

shortness of breath or cough.  No abdominal pain, no diarrhea.



PHYSICAL EXAMINATION:

Blood pressure 130/64, pulse of 87, temp is 97.9.  He is 92% on 2 L nasal 
cannula.

General description is an elderly male lying in bed in no distress.  Respiratory

system: Unlabored breathing, clear to auscultation anteriorly.  Heart S1, S2.  
Regular

rate and rhythm.  Abdomen soft, no tenderness.



LABS:

No new labs been obtained today.  Chest x-ray was negative.



DIAGNOSTIC IMPRESSION AND PLAN:

Patient with  punstageableressure ulcer with significant slough tissue.  Local 
care to

continue with Santyl followed by moist dressing.  No need for any systemic 
antibiotic

therapy.  Wife at the bedside, questions were answered.





MMODL / IJN: [id number] / Job#: [id number]

MTDD

## 2021-05-09 NOTE — P.PN
Progress Note - Text


Progress Note Date: 05/09/21





Chief Complaint: Decreased responsiveness





History of presenting complaint:


This is a 76-year-old patient follows with Dr. Ramires.  Resident of [facility].  Chronic stable medical conditions include atrial fibrillation, 

CHF EF of 2025%, diabetes, GERD, hypertension, hyperlipidemia, osteoarthritis, 

BPH, peripheral neuropathy, chronic sacral ulcer, anybody dementia, hypothyroid.

 On April 15 patient had a permanent pacemaker placed.  Patient up to that had 

been using a walker.  Subsequent that patient has not really walked.  Patient is

able to answer some simple questions.  Most of the history is obtained by the 

wife at the bedside.  Patient's appetite has been okay.  Patient's had an 

episode where he just becomes unresponsive.  He's had 2 prior episodes of the 

same.  Neurological workup has been negative for the same.  On this occasion 

again an episode of unconsciousness.  EMS was called out.  Patient breathing was

14-16.  Skin was warm.  Blood glucose was 81.  Patient had no response to 

sternal rub.  Telemetry showed paced rhythm.  Pulse ox on 3 inches was close to 

100% patient did not respond to Narcan.  Patient last seen well was the previous

night.  Patient did, not in the ER.  Elevated sodium/dehydration.  Gentle 

hydration


Admitted with possible abnormality of SYD.  Dose of Seroquel cutback.  Has been 

more awake with that.


Today: Hypoglycemic.  Glyburide discontinued.  Hypoglycemic protocol.  Patient 

is tired





Review of systems: Was done for constitutional, cardiovascular, GI, pulmonary. 

relevant finding as above








Past medical history to include:


atrial fibrillation, CHF EF of 2025%, diabetes, GERD, hypertension, hyperlipide

vianca, osteoarthritis, BPH, peripheral neuropathy, chronic sacral ulcer, anybody 

dementia, hypothyroid.  On April 15 patient had a permanent pacemaker placed





Social history:


Resident of [facility] on.  Due to weakness does not participate in

physical therapy.  Patient smoked from 1964 through 1988.  Alcohol rarely.  

.





Family history:


Reviewed, noncontributory to presentation





Physical examination:


VITAL SIGNS: 97.6, 82, 20, 10 8 x 72, 92% on 2 L


GENERAL: BMI 32.3, laying in bed, a bit sleepy today


EYES: Pupils equal.  Conjunctiva normal.


NECK: JVD not raised; masses not palpable.


HEART: First and second heart sounds are normal;  no edema.  


LUNGS:[ Respiratory rate normal; decreased breath sounds.  


ABDOMEN: Soft,  nontender, liver spleen not palpable, no masses palpable.  


PSYCH: Lethargic but answers some questions.  


NEUROLOGICAL: [Cranial nerves grossly intact; no facial asymmetry, able to lift 

both his arms.  Able to move both legs and barely lift off the bed.








INVESTIGATIONS, reviewed in the clinical context:





May 9: Accu-Cheks: 66, 63, 72, potassium 3.6 sodium 146 creatinine 1.06


May 8: Sodium 148 potassium 3.1 creatinine 1.11


WBC 9 hemoglobin 11.8 platelets 240 sodium 148 potassium 3.3 bun 34 creatinine 

1.25


UA-hyaline casts 17


Urine drug screen positive for tricyclic antidepressant


Influenza type A, diabetic, RSV, COVID 19 [PCR]: Negative


EKG tracing personally reviewed by me-ventricular paced rhythm.


CT surgeon group brain without contrast: Showing atrophy


Chest x-ray film personally seen by me: Enlarged heart.  No obvious infiltrate





Previous testing:


April /2021


Carotid Doppler: No significant stenosis


EEG: Negative for epileptiform activity


2-D echocardiogram: EF 20-25%, left ventricle moderately dilated.  Moderate 

tricuspid regurgitation, moderate to severe pulmonary hypertension





Assessment and plan:





-Episode of unconsciousness.  No seizure activity reported.  This is patient's 

at least third episode.  Prior workup including carotid Doppler, EEG, computed 

tomography scan of brain old negative.  Patient is awake again.  Possible 

abnormality of the reticular activating system.  Cutback Seroquel to 12.5 mg 

daily at bedtime-better





-Persistent atrial fibrillation


Patient has a telemetry.  AICD.  Pacemaker.  Toprol-XL





-Chronic congestive heart failure from systolic dysfunction EF 20-25%


Continue with Aldactone, Zestril, Toprol-XL.  Lasix held





-Moderate to severe tricuspid and mitral regurgitation


Follow clinically





-Secondary moderate to severe pulmonary hypertension secondary to CHF and COPD


Follow clinically





-COPD in a previous smoker


Bronchodilators





-Major cognitive impairment from Lewy body dementia





-Diabetes mellitus type 2, uncontrolled with hypoglycemia


Follow Accu-Cheks.  Hold Glucotrol as Accu-Cheks are running on the lower side.





-GERD


On Pepcid





-Essential hypertension


Continue with Zestril





-Primary osteoarthritis multiple joints bilaterally


Use pain medications as needed





-Diabetic peripheral neuropathy





-Chronic sacral decubitus ulcer


Consult ID





-Acute on chronic medical debility.  Patient was using a walker up to April 15. 

 Now not able to walk.  PTOT





-Persistent atrial fibrillation rate controlled


Patient has AICD.





-Full code





-Hypernatremia, from free water deficit


Gentle hydration.  Continue to hold Lasix.








Give another 50 mL an hour of saline for another 500 mL..  Follow Accu-Cheks.  

If Accu-Cheks, improve tomorrow.  Should be able to be discharged to the ECF.

## 2021-05-10 LAB
ANION GAP SERPL CALC-SCNC: 4 MMOL/L
ANION GAP SERPL CALC-SCNC: 8 MMOL/L
BUN SERPL-SCNC: 32 MG/DL (ref 9–20)
BUN SERPL-SCNC: 35 MG/DL (ref 9–20)
CALCIUM SPEC-MCNC: 7.9 MG/DL (ref 8.4–10.2)
CALCIUM SPEC-MCNC: 8.1 MG/DL (ref 8.4–10.2)
CELLS COUNTED: 100
CHLORIDE SERPL-SCNC: 104 MMOL/L (ref 98–107)
CHLORIDE SERPL-SCNC: 106 MMOL/L (ref 98–107)
CO2 BLDA-SCNC: 36 MMOL/L (ref 19–24)
CO2 BLDA-SCNC: 38 MMOL/L (ref 19–24)
CO2 SERPL-SCNC: 30 MMOL/L (ref 22–30)
CO2 SERPL-SCNC: 36 MMOL/L (ref 22–30)
EOSINOPHIL # BLD MANUAL: 0.11 K/UL (ref 0–0.7)
ERYTHROCYTE [DISTWIDTH] IN BLOOD BY AUTOMATED COUNT: 3.85 M/UL (ref 4.3–5.9)
ERYTHROCYTE [DISTWIDTH] IN BLOOD: 16.2 % (ref 11.5–15.5)
GLUCOSE BLD-MCNC: 108 MG/DL (ref 75–99)
GLUCOSE BLD-MCNC: 109 MG/DL (ref 75–99)
GLUCOSE BLD-MCNC: 119 MG/DL (ref 75–99)
GLUCOSE BLD-MCNC: 95 MG/DL (ref 75–99)
GLUCOSE BLD-MCNC: 95 MG/DL (ref 75–99)
GLUCOSE SERPL-MCNC: 115 MG/DL (ref 74–99)
GLUCOSE SERPL-MCNC: 116 MG/DL (ref 74–99)
HCO3 BLDA-SCNC: 33 MMOL/L (ref 21–25)
HCO3 BLDA-SCNC: 35 MMOL/L (ref 21–25)
HCT VFR BLD AUTO: 42.3 % (ref 39–53)
HGB BLD-MCNC: 12.6 GM/DL (ref 13–17.5)
HYALINE CASTS UR QL AUTO: 54 /LPF (ref 0–2)
INR PPP: 4.2 (ref ?–1.2)
LYMPHOCYTES # BLD MANUAL: 1.85 K/UL (ref 1–4.8)
MAGNESIUM SPEC-SCNC: 1.7 MG/DL (ref 1.6–2.3)
MCH RBC QN AUTO: 32.7 PG (ref 25–35)
MCHC RBC AUTO-ENTMCNC: 29.8 G/DL (ref 31–37)
MCV RBC AUTO: 109.9 FL (ref 80–100)
MONOCYTES # BLD MANUAL: 0.65 K/UL (ref 0–1)
NEUTROPHILS NFR BLD MANUAL: 74 %
NEUTS SEG # BLD MANUAL: 8.2 K/UL (ref 1.3–7.7)
PCO2 BLDA: 76 MMHG (ref 35–45)
PCO2 BLDA: 93 MMHG (ref 35–45)
PH BLDA: 7.18 [PH] (ref 7.35–7.45)
PH BLDA: 7.25 [PH] (ref 7.35–7.45)
PH UR: 5.5 [PH] (ref 5–8)
PLATELET # BLD AUTO: 296 K/UL (ref 150–450)
PO2 BLDA: 70 MMHG (ref 83–108)
PO2 BLDA: 82 MMHG (ref 83–108)
POTASSIUM SERPL-SCNC: 4.6 MMOL/L (ref 3.5–5.1)
POTASSIUM SERPL-SCNC: 4.9 MMOL/L (ref 3.5–5.1)
PROT UR QL: (no result)
PT BLD: 40.4 SEC (ref 9–12)
RBC UR QL: 19 /HPF (ref 0–5)
SODIUM SERPL-SCNC: 144 MMOL/L (ref 137–145)
SODIUM SERPL-SCNC: 144 MMOL/L (ref 137–145)
SP GR UR: 1.02 (ref 1–1.03)
SQUAMOUS UR QL AUTO: 2 /HPF (ref 0–4)
UROBILINOGEN UR QL STRIP: 6 MG/DL (ref ?–2)
WBC # BLD AUTO: 10.9 K/UL (ref 3.8–10.6)
WBC # UR AUTO: 178 /HPF (ref 0–5)

## 2021-05-10 RX ADMIN — COLLAGENASE SANTYL SCH APPLIC: 250 OINTMENT TOPICAL at 11:55

## 2021-05-10 RX ADMIN — MAGNESIUM SULFATE IN DEXTROSE SCH MLS/HR: 10 INJECTION, SOLUTION INTRAVENOUS at 11:55

## 2021-05-10 RX ADMIN — CEFAZOLIN SCH MLS/HR: 330 INJECTION, POWDER, FOR SOLUTION INTRAMUSCULAR; INTRAVENOUS at 05:16

## 2021-05-10 RX ADMIN — INSULIN ASPART SCH: 100 INJECTION, SOLUTION INTRAVENOUS; SUBCUTANEOUS at 12:00

## 2021-05-10 RX ADMIN — PIPERACILLIN AND TAZOBACTAM SCH MLS/HR: 3; .375 INJECTION, POWDER, FOR SOLUTION INTRAVENOUS at 23:57

## 2021-05-10 RX ADMIN — NOREPINEPHRINE BITARTRATE SCH MLS/HR: 1 INJECTION, SOLUTION, CONCENTRATE INTRAVENOUS at 17:10

## 2021-05-10 RX ADMIN — INSULIN ASPART SCH: 100 INJECTION, SOLUTION INTRAVENOUS; SUBCUTANEOUS at 18:23

## 2021-05-10 RX ADMIN — NOREPINEPHRINE BITARTRATE SCH MLS/HR: 1 INJECTION, SOLUTION, CONCENTRATE INTRAVENOUS at 23:38

## 2021-05-10 RX ADMIN — LEVOTHYROXINE SODIUM ANHYDROUS SCH MCG: 100 INJECTION, POWDER, LYOPHILIZED, FOR SOLUTION INTRAVENOUS at 09:10

## 2021-05-10 RX ADMIN — ISOSORBIDE MONONITRATE SCH: 30 TABLET, EXTENDED RELEASE ORAL at 05:27

## 2021-05-10 RX ADMIN — FINASTERIDE SCH: 5 TABLET, FILM COATED ORAL at 20:12

## 2021-05-10 RX ADMIN — LEVOTHYROXINE SODIUM SCH: 25 TABLET ORAL at 05:27

## 2021-05-10 RX ADMIN — INSULIN ASPART SCH: 100 INJECTION, SOLUTION INTRAVENOUS; SUBCUTANEOUS at 20:15

## 2021-05-10 RX ADMIN — INSULIN ASPART SCH: 100 INJECTION, SOLUTION INTRAVENOUS; SUBCUTANEOUS at 06:45

## 2021-05-10 RX ADMIN — CEFAZOLIN SCH: 330 INJECTION, POWDER, FOR SOLUTION INTRAMUSCULAR; INTRAVENOUS at 03:05

## 2021-05-10 RX ADMIN — MAGNESIUM SULFATE IN DEXTROSE SCH MLS/HR: 10 INJECTION, SOLUTION INTRAVENOUS at 13:00

## 2021-05-10 RX ADMIN — FAMOTIDINE SCH MG: 10 INJECTION, SOLUTION INTRAVENOUS at 09:10

## 2021-05-10 RX ADMIN — SPIRONOLACTONE SCH: 25 TABLET, FILM COATED ORAL at 10:24

## 2021-05-10 RX ADMIN — NOREPINEPHRINE BITARTRATE SCH MLS/HR: 1 INJECTION, SOLUTION, CONCENTRATE INTRAVENOUS at 06:35

## 2021-05-10 NOTE — P.PN
Subjective


Progress Note Date: 05/10/21


The patient was seen for the first time.  Please refer to Dr. Gonzalez's note for 

further detailed neurological history.  


Upon seeing the patient he was on Bipap machine and was on norepinephrine drip.





During the hospital stay the patient blood pressure has been systolic in the 70s

to 110s over the 40s to 50s.  It got as low as 62/43 overnight around 3:48 AM.


Per ICU team it is felt the patient has acute septic shock related to acute 

urinary tract infection possibly pneumonia is not excluded that.


Patient has acute hypoxic and hypercapnic respiratory failure








Objective





- Vital Signs


Vital signs: 


                                   Vital Signs











Temp  97.4 F L  05/10/21 12:00


 


Pulse  103 H  05/10/21 18:00


 


Resp  12   05/10/21 18:00


 


BP  99/56   05/10/21 18:00


 


Pulse Ox  94 L  05/10/21 18:00








                                 Intake & Output











 05/09/21 05/10/21 05/10/21





 18:59 06:59 18:59


 


Intake Total   2554.000


 


Output Total 250 200 42


 


Balance -250 -200 2512.000


 


Weight  103.4 kg 


 


Intake:   


 


  IV   2300


 


    Magnesium Sulfate-D5w Pmx   200





    1 gm In Dextrose/Water 1   





    100ml.bag @ 100 mls/hr   





    IVPB Q1H [facility] Rx#:   





    [id number]   


 


    Sodium Chloride 0.9% 500   1500





    ml @ 999 mls/hr IV .Q31M   





    [facility] Rx#:[id number]   


 


    Vancomycin 1,750 mg In   500





    Sodium Chloride 0.9% 500   





    ml 500 ml @ 167 mls/hr   





    IVPB Q24H [facility] Rx#:   





    [id number]   


 


    cefTRIAXone 1 gm In   100





    Sodium Chloride 0.9% 50   





    ml @ 100 mls/hr IVPB   





    Q24HR [facility] Rx#:[id number]   


 


  Intake, IV Titration   254.000





  Amount   


 


    Norepinephrine 4 mg In   254.000





    Sodium Chloride 0.9% 250   





    ml @ 0.05 MCG/KG/MIN 19.   





    907 mls/hr IV .[id number]   





    [facility] Rx#:[id number]   


 


Output:   


 


  Urine 250 200 42


 


Other:   


 


  Voiding Method External Catheter External Catheter Indwelling Catheter














- Exam


Gen.: Does not seem in acute distress.


Respiratory: Is on Bipap machine





Neurological examination


Mental status: The patient is drowsy but awakeable to voice.  Follows simple 

commands (squeezing hands to commands and moving ankles).  


Cranial nerves: Pupils are equal at 3 mm and reactive to light.  Could not 

assess visual field because of his condition.  There is a mild right exotropia. 

No facial weakness noted.  Rest of cranial nerves could not be assessed.


Motor: Strength was Hard to assess because of his cooperation.  His able to have

a good hand  bilaterally.  Moving right ankles antigravity (right > left) 

while left had 1-2/5.


Sensation: Could not assess.








- Labs


CBC & Chem 7: 


                                 05/10/21 04:00





                                 05/10/21 14:20


Labs: 


                  Abnormal Lab Results - Last 24 Hours (Table)











  05/09/21 05/10/21 05/10/21 Range/Units





  21:05 03:49 04:00 


 


WBC     (3.8-10.6)  k/uL


 


RBC     (4.30-5.90)  m/uL


 


Hgb     (13.0-17.5)  gm/dL


 


MCV     (80.0-100.0)  fL


 


MCHC     (31.0-37.0)  g/dL


 


RDW     (11.5-15.5)  %


 


Neutrophils # (Manual)     (1.3-7.7)  k/uL


 


Macrocytosis     


 


PT    40.4 H  (9.0-12.0)  sec


 


INR    4.2 H  (<1.2)  


 


ABG pH     (7.35-7.45)  


 


ABG pCO2     (35-45)  mmHg


 


ABG pO2     ()  mmHg


 


ABG HCO3     (21-25)  mmol/L


 


ABG Total CO2     (19-24)  mmol/L


 


ABG O2 Saturation     (94-97)  %


 


Carbon Dioxide     (22-30)  mmol/L


 


BUN     (9-20)  mg/dL


 


Creatinine     (0.66-1.25)  mg/dL


 


Glucose     (74-99)  mg/dL


 


POC Glucose (mg/dL)  127 H  119 H   (75-99)  mg/dL


 


Calcium     (8.4-10.2)  mg/dL


 


Urine Protein     (Negative)  


 


Urine Blood     (Negative)  


 


Ur Leukocyte Esterase     (Negative)  


 


Urine RBC     (0-5)  /hpf


 


Urine WBC     (0-5)  /hpf


 


Urine WBC Clumps     (None)  /hpf


 


Amorphous Sediment     (None)  /hpf


 


Urine Bacteria     (None)  /hpf


 


Hyaline Casts     (0-2)  /lpf


 


Urine Mucus     (None)  /hpf














  05/10/21 05/10/21 05/10/21 Range/Units





  04:00 04:00 04:20 


 


WBC  10.9 H    (3.8-10.6)  k/uL


 


RBC  3.85 L    (4.30-5.90)  m/uL


 


Hgb  12.6 L    (13.0-17.5)  gm/dL


 


MCV  109.9 H    (80.0-100.0)  fL


 


MCHC  29.8 L    (31.0-37.0)  g/dL


 


RDW  16.2 H    (11.5-15.5)  %


 


Neutrophils # (Manual)  8.20 H    (1.3-7.7)  k/uL


 


Macrocytosis  Marked A    


 


PT     (9.0-12.0)  sec


 


INR     (<1.2)  


 


ABG pH    7.18 L*  (7.35-7.45)  


 


ABG pCO2    93 H*  (35-45)  mmHg


 


ABG pO2    70 L  ()  mmHg


 


ABG HCO3    35 H  (21-25)  mmol/L


 


ABG Total CO2    38 H  (19-24)  mmol/L


 


ABG O2 Saturation    92.4 L  (94-97)  %


 


Carbon Dioxide   36 H   (22-30)  mmol/L


 


BUN   32 H   (9-20)  mg/dL


 


Creatinine   1.68 H   (0.66-1.25)  mg/dL


 


Glucose   115 H   (74-99)  mg/dL


 


POC Glucose (mg/dL)     (75-99)  mg/dL


 


Calcium   7.9 L   (8.4-10.2)  mg/dL


 


Urine Protein     (Negative)  


 


Urine Blood     (Negative)  


 


Ur Leukocyte Esterase     (Negative)  


 


Urine RBC     (0-5)  /hpf


 


Urine WBC     (0-5)  /hpf


 


Urine WBC Clumps     (None)  /hpf


 


Amorphous Sediment     (None)  /hpf


 


Urine Bacteria     (None)  /hpf


 


Hyaline Casts     (0-2)  /lpf


 


Urine Mucus     (None)  /hpf














  05/10/21 05/10/21 05/10/21 Range/Units





  04:35 06:08 14:20 


 


WBC     (3.8-10.6)  k/uL


 


RBC     (4.30-5.90)  m/uL


 


Hgb     (13.0-17.5)  gm/dL


 


MCV     (80.0-100.0)  fL


 


MCHC     (31.0-37.0)  g/dL


 


RDW     (11.5-15.5)  %


 


Neutrophils # (Manual)     (1.3-7.7)  k/uL


 


Macrocytosis     


 


PT     (9.0-12.0)  sec


 


INR     (<1.2)  


 


ABG pH   7.25 L   (7.35-7.45)  


 


ABG pCO2   76 H*   (35-45)  mmHg


 


ABG pO2   82 L   ()  mmHg


 


ABG HCO3   33 H   (21-25)  mmol/L


 


ABG Total CO2   36 H   (19-24)  mmol/L


 


ABG O2 Saturation     (94-97)  %


 


Carbon Dioxide     (22-30)  mmol/L


 


BUN    35 H  (9-20)  mg/dL


 


Creatinine    1.76 H  (0.66-1.25)  mg/dL


 


Glucose    116 H  (74-99)  mg/dL


 


POC Glucose (mg/dL)     (75-99)  mg/dL


 


Calcium    8.1 L  (8.4-10.2)  mg/dL


 


Urine Protein  2+ H    (Negative)  


 


Urine Blood  Small H    (Negative)  


 


Ur Leukocyte Esterase  Large H    (Negative)  


 


Urine RBC  19 H    (0-5)  /hpf


 


Urine WBC  178 H    (0-5)  /hpf


 


Urine WBC Clumps  Moderate H    (None)  /hpf


 


Amorphous Sediment  Rare H    (None)  /hpf


 


Urine Bacteria  Many H    (None)  /hpf


 


Hyaline Casts  54 H    (0-2)  /lpf


 


Urine Mucus  Rare H    (None)  /hpf














  05/10/21 Range/Units





  18:22 


 


WBC   (3.8-10.6)  k/uL


 


RBC   (4.30-5.90)  m/uL


 


Hgb   (13.0-17.5)  gm/dL


 


MCV   (80.0-100.0)  fL


 


MCHC   (31.0-37.0)  g/dL


 


RDW   (11.5-15.5)  %


 


Neutrophils # (Manual)   (1.3-7.7)  k/uL


 


Macrocytosis   


 


PT   (9.0-12.0)  sec


 


INR   (<1.2)  


 


ABG pH   (7.35-7.45)  


 


ABG pCO2   (35-45)  mmHg


 


ABG pO2   ()  mmHg


 


ABG HCO3   (21-25)  mmol/L


 


ABG Total CO2   (19-24)  mmol/L


 


ABG O2 Saturation   (94-97)  %


 


Carbon Dioxide   (22-30)  mmol/L


 


BUN   (9-20)  mg/dL


 


Creatinine   (0.66-1.25)  mg/dL


 


Glucose   (74-99)  mg/dL


 


POC Glucose (mg/dL)  108 H  (75-99)  mg/dL


 


Calcium   (8.4-10.2)  mg/dL


 


Urine Protein   (Negative)  


 


Urine Blood   (Negative)  


 


Ur Leukocyte Esterase   (Negative)  


 


Urine RBC   (0-5)  /hpf


 


Urine WBC   (0-5)  /hpf


 


Urine WBC Clumps   (None)  /hpf


 


Amorphous Sediment   (None)  /hpf


 


Urine Bacteria   (None)  /hpf


 


Hyaline Casts   (0-2)  /lpf


 


Urine Mucus   (None)  /hpf








                      Microbiology - Last 24 Hours (Table)











 05/10/21 04:35 Urine Culture - Preliminary





 Urine,Voided 














Assessment and Plan


Assessment: 





Altered mental status due to septic shock (from UTI)


Acute septic shock related due to acute urinary tract infection.  Pneumonia is 

not exclude


Unresponsiveness/loss of consciousness without reported tonic-clonic movements 

or postictal state (this is reportedly a third episode)


Possible autonomic dysfunction secondary to Lewy body dementia


Acute kidney injury--worsening


History of Lewy body dementia with bilateral lower extremity weakness


Reported sudden onset paraplegia/inability to ambulate following surgery for pac

emaker placement


Acute hypoxic hypercapnic respiratory failure


Chronic atrial fibrillation on Coumadin with INR supratherapeutic today.


Chronic systolic congestive heart failure with ejection fraction of 20-25% 

status post AICD/pacemaker





Plan: 


  


Check orthostatic vitals once patient is stable.


physical therapy and occupational therapy are consulted.


Because of the episode of unresponsiveness, I ordered a routine EEG.  I will not

start the patient on antiepileptic drug unless there is applicable discharge 

procedure on the EEG.





Cardiology team is consulted.





Will defer the rest of medical management to the ICU/Primary team.





Sd Ramsey MD


Neuro-Hospitalist











Time with Patient: Less than 30

## 2021-05-10 NOTE — P.PN
Subjective


Progress Note Date: 05/10/21


Principal diagnosis: 





Acute hypoxic and hypercapnic respiratory failure





Mr. Motley is a 76-year-old male with a past medical history of that his 

mattress, chronically treated for depression, chronic systolic congestive heart 

failure with ejection fraction 20-25%, status post AICD, pacemaker placement, 

hypertension, hyperlipidemia, chronic stage III sacral decubitus ulcers, david

vated body dementia who was initially admitted to the hospital for altered 

mental status changes.  Patient was found unresponsive at the nursing home and 

so brought into the hospital for further evaluation.  Patient had a CAT scan of 

the brain showing atrophy with no acute intracranial process and a chest x-ray 

showed no cardiopulmonary process.  Patient was being closely followed by 

neurology and ID services during the hospital stay.





On 05/10/2021 - earlier this morning rapid responds team was activated due to 

decreased level of consciousness and hypotension.  So the patient was given half

a liter of bolus normal saline and a ABGs showed pH of 7.18, pCO2 93, pO2 70, so

the patient was transferred to the intensive care unit and started on 

noninvasive positive pressure ventilation.  Patient had a chest x-ray showed 

done showing pulmonary edema and urinalysis showing large leukocyte esterase 

moderate WBC clumps.  So the patient was started on ceftriaxone and vancomycin, 

pending urine and blood cultures.  Currently patient is on BiPAP support and 

producing minimal urinary output through the Suarez's catheter.  Reviewed the 

patient's labs from this morning showing white count of 10.9, hemoglobin 12.3, 

platelets 296.  INR 4.2.  Sodium is 144, potassium 4.9, chloride 104, bicarb 36,

BUN 32, creatinine 1.68.  Calcium 7.9.





Patient's medications have been reviewed. 


Active Medications





Bisacodyl (Bisacodyl 5 Mg Tablet.Dr)  5 mg PO DAILY PRN


   PRN Reason: Constipation


Collagenase (Collagenase 250 Unit/Gm Ointment 30 Gm Tube)  1 applic TOPICAL 

DAILY [facility]


   Last Admin: 05/10/21 11:55 Dose:  1 applic


   Documented by: 


Famotidine (Famotidine 20 Mg/2 Ml Vial)  20 mg IV DAILY [facility]


   Last Admin: 05/10/21 09:10 Dose:  20 mg


   Documented by: 


Finasteride (Finasteride 5 Mg Tab)  5 mg PO HS [facility]


   Last Admin: 05/09/21 23:34 Dose:  Not Given


   Documented by: 


Norepinephrine Bitartrate 4 mg (/ Sodium Chloride)  254 mls @ 19.907 mls/hr IV 

.[id number] [facility]; Protocol


   Last Titration: 05/10/21 09:30 Dose:  0.08 mcg/kg/min, 31.852 mls/hr


   Documented by: 


Ceftriaxone Sodium 1 gm/ (Sodium Chloride)  50 mls @ 100 mls/hr IVPB Q24HR [facility]


   Last Admin: 05/10/21 09:09 Dose:  100 mls/hr


   Documented by: 


Vancomycin HCl 1,750 mg/ (Sodium Chloride)  500 mls @ 167 mls/hr IVPB Q24H [facility]


Insulin Aspart (Insulin Aspart (Novolog) 100 Unit/Ml Vial)  0 unit SQ ACHS [facility]; 

Protocol


   Last Admin: 05/10/21 12:00 Dose:  Not Given


   Documented by: 


Levothyroxine Sodium (Levothyroxine Ivp 100 Mcg/5 Ml Vial)  12.5 mcg IV 

DAILY@0630 [facility]


   Last Admin: 05/10/21 09:10 Dose:  12.5 mcg


   Documented by: 


Miscellaneous Information (Warfarin Per Pharmacy)  1 each MISCELLANE AS DIRECTED

PRN; Protocol


   PRN Reason: Per Protocol


Ondansetron HCl (Ondansetron 4 Mg/2 Ml Vial)  4 mg IVP Q6HR PRN


   PRN Reason: Nausea And Vomiting


   Last Admin: 05/09/21 16:13 Dose:  4 mg


   Documented by: 


Ropinirole HCl (Ropinirole Hcl 0.25 Mg Tab)  0.25 mg PO HS [facility]


   Last Admin: 05/09/21 23:36 Dose:  Not Given


   Documented by: 


Spironolactone (Spironolactone 25 Mg Tab)  25 mg PO DAILY@0900 [facility]


   Last Admin: 05/10/21 10:24 Dose:  Not Given


   Documented by: 


Tramadol HCl (Tramadol 50 Mg Tab)  50 mg PO Q6H PRN


   PRN Reason: Pain


   Last Admin: 05/09/21 15:07 Dose:  50 mg


   Documented by: 


Warfarin Sodium (Warfarin 0.5 Mg Tab)  0 mg PO ONCE@1800 [facility]


   Stop: 05/10/21 18:01











Objective





- Vital Signs


Vital signs: 


                                   Vital Signs











Temp  97.8 F   05/10/21 03:48


 


Pulse  94   05/10/21 10:00


 


Resp  21   05/10/21 10:00


 


BP  82/47   05/10/21 10:00


 


Pulse Ox  94 L  05/10/21 10:00








                                 Intake & Output











 05/09/21 05/10/21 05/10/21





 18:59 06:59 18:59


 


Intake Total   1116.257


 


Output Total 250 200 5


 


Balance -250 -200 1111.257


 


Weight  103.4 kg 


 


Intake:   


 


  IV   1100


 


    Sodium Chloride 0.9% 500   500





    ml @ 999 mls/hr IV .Q31M   





    [facility] Rx#:[id number]   


 


    Vancomycin 1,750 mg In   500





    Sodium Chloride 0.9% 500   





    ml 500 ml @ 167 mls/hr   





    IVPB Q24H [facility] Rx#:   





    [id number]   


 


    cefTRIAXone 1 gm In   100





    Sodium Chloride 0.9% 50   





    ml @ 100 mls/hr IVPB   





    Q24HR [facility] Rx#:[id number]   


 


  Intake, IV Titration   16.257





  Amount   


 


    Norepinephrine 4 mg In   16.257





    Sodium Chloride 0.9% 250   





    ml @ 0.05 MCG/KG/MIN 19.   





    907 mls/hr IV .[id number]   





    [facility] Rx#:[id number]   


 


Output:   


 


  Urine 250 200 5


 


Other:   


 


  Voiding Method External Catheter External Catheter 














- Exam





Physical examination:





GENERAL: , laying in bed, patient is lethargic and opens his eyes on calling his

name but could not follow commands


EYES: Pupils equal.  Conjunctiva normal.


NECK: JVD not raised; masses not palpable.


HEART: Tachycardia 


LUNGS: Tachypnea, bibasilar crackles.


ABDOMEN: Soft,  nontender, liver spleen not palpable, no masses palpable.  


NEUROLOGICAL: Patient is lethargic





- Labs


CBC & Chem 7: 


                                 05/12/21 03:28





                                 05/12/21 03:28


Labs: 


                  Abnormal Lab Results - Last 24 Hours (Table)











  05/09/21 05/09/21 05/09/21 Range/Units





  11:30 15:05 16:28 


 


WBC     (3.8-10.6)  k/uL


 


RBC     (4.30-5.90)  m/uL


 


Hgb     (13.0-17.5)  gm/dL


 


MCV     (80.0-100.0)  fL


 


MCHC     (31.0-37.0)  g/dL


 


RDW     (11.5-15.5)  %


 


Neutrophils # (Manual)     (1.3-7.7)  k/uL


 


Macrocytosis     


 


PT     (9.0-12.0)  sec


 


INR     (<1.2)  


 


ABG pH     (7.35-7.45)  


 


ABG pCO2     (35-45)  mmHg


 


ABG pO2     ()  mmHg


 


ABG HCO3     (21-25)  mmol/L


 


ABG Total CO2     (19-24)  mmol/L


 


ABG O2 Saturation     (94-97)  %


 


Carbon Dioxide     (22-30)  mmol/L


 


BUN     (9-20)  mg/dL


 


Creatinine     (0.66-1.25)  mg/dL


 


Glucose     (74-99)  mg/dL


 


POC Glucose (mg/dL)  100 H  129 H  146 H  (75-99)  mg/dL


 


Calcium     (8.4-10.2)  mg/dL


 


Urine Protein     (Negative)  


 


Urine Blood     (Negative)  


 


Ur Leukocyte Esterase     (Negative)  


 


Urine RBC     (0-5)  /hpf


 


Urine WBC     (0-5)  /hpf


 


Urine WBC Clumps     (None)  /hpf


 


Amorphous Sediment     (None)  /hpf


 


Urine Bacteria     (None)  /hpf


 


Hyaline Casts     (0-2)  /lpf


 


Urine Mucus     (None)  /hpf














  05/09/21 05/10/21 05/10/21 Range/Units





  21:05 03:49 04:00 


 


WBC     (3.8-10.6)  k/uL


 


RBC     (4.30-5.90)  m/uL


 


Hgb     (13.0-17.5)  gm/dL


 


MCV     (80.0-100.0)  fL


 


MCHC     (31.0-37.0)  g/dL


 


RDW     (11.5-15.5)  %


 


Neutrophils # (Manual)     (1.3-7.7)  k/uL


 


Macrocytosis     


 


PT    40.4 H  (9.0-12.0)  sec


 


INR    4.2 H  (<1.2)  


 


ABG pH     (7.35-7.45)  


 


ABG pCO2     (35-45)  mmHg


 


ABG pO2     ()  mmHg


 


ABG HCO3     (21-25)  mmol/L


 


ABG Total CO2     (19-24)  mmol/L


 


ABG O2 Saturation     (94-97)  %


 


Carbon Dioxide     (22-30)  mmol/L


 


BUN     (9-20)  mg/dL


 


Creatinine     (0.66-1.25)  mg/dL


 


Glucose     (74-99)  mg/dL


 


POC Glucose (mg/dL)  127 H  119 H   (75-99)  mg/dL


 


Calcium     (8.4-10.2)  mg/dL


 


Urine Protein     (Negative)  


 


Urine Blood     (Negative)  


 


Ur Leukocyte Esterase     (Negative)  


 


Urine RBC     (0-5)  /hpf


 


Urine WBC     (0-5)  /hpf


 


Urine WBC Clumps     (None)  /hpf


 


Amorphous Sediment     (None)  /hpf


 


Urine Bacteria     (None)  /hpf


 


Hyaline Casts     (0-2)  /lpf


 


Urine Mucus     (None)  /hpf














  05/10/21 05/10/21 05/10/21 Range/Units





  04:00 04:00 04:20 


 


WBC  10.9 H    (3.8-10.6)  k/uL


 


RBC  3.85 L    (4.30-5.90)  m/uL


 


Hgb  12.6 L    (13.0-17.5)  gm/dL


 


MCV  109.9 H    (80.0-100.0)  fL


 


MCHC  29.8 L    (31.0-37.0)  g/dL


 


RDW  16.2 H    (11.5-15.5)  %


 


Neutrophils # (Manual)  8.20 H    (1.3-7.7)  k/uL


 


Macrocytosis  Marked A    


 


PT     (9.0-12.0)  sec


 


INR     (<1.2)  


 


ABG pH    7.18 L*  (7.35-7.45)  


 


ABG pCO2    93 H*  (35-45)  mmHg


 


ABG pO2    70 L  ()  mmHg


 


ABG HCO3    35 H  (21-25)  mmol/L


 


ABG Total CO2    38 H  (19-24)  mmol/L


 


ABG O2 Saturation    92.4 L  (94-97)  %


 


Carbon Dioxide   36 H   (22-30)  mmol/L


 


BUN   32 H   (9-20)  mg/dL


 


Creatinine   1.68 H   (0.66-1.25)  mg/dL


 


Glucose   115 H   (74-99)  mg/dL


 


POC Glucose (mg/dL)     (75-99)  mg/dL


 


Calcium   7.9 L   (8.4-10.2)  mg/dL


 


Urine Protein     (Negative)  


 


Urine Blood     (Negative)  


 


Ur Leukocyte Esterase     (Negative)  


 


Urine RBC     (0-5)  /hpf


 


Urine WBC     (0-5)  /hpf


 


Urine WBC Clumps     (None)  /hpf


 


Amorphous Sediment     (None)  /hpf


 


Urine Bacteria     (None)  /hpf


 


Hyaline Casts     (0-2)  /lpf


 


Urine Mucus     (None)  /hpf














  05/10/21 05/10/21 Range/Units





  04:35 06:08 


 


WBC    (3.8-10.6)  k/uL


 


RBC    (4.30-5.90)  m/uL


 


Hgb    (13.0-17.5)  gm/dL


 


MCV    (80.0-100.0)  fL


 


MCHC    (31.0-37.0)  g/dL


 


RDW    (11.5-15.5)  %


 


Neutrophils # (Manual)    (1.3-7.7)  k/uL


 


Macrocytosis    


 


PT    (9.0-12.0)  sec


 


INR    (<1.2)  


 


ABG pH   7.25 L  (7.35-7.45)  


 


ABG pCO2   76 H*  (35-45)  mmHg


 


ABG pO2   82 L  ()  mmHg


 


ABG HCO3   33 H  (21-25)  mmol/L


 


ABG Total CO2   36 H  (19-24)  mmol/L


 


ABG O2 Saturation    (94-97)  %


 


Carbon Dioxide    (22-30)  mmol/L


 


BUN    (9-20)  mg/dL


 


Creatinine    (0.66-1.25)  mg/dL


 


Glucose    (74-99)  mg/dL


 


POC Glucose (mg/dL)    (75-99)  mg/dL


 


Calcium    (8.4-10.2)  mg/dL


 


Urine Protein  2+ H   (Negative)  


 


Urine Blood  Small H   (Negative)  


 


Ur Leukocyte Esterase  Large H   (Negative)  


 


Urine RBC  19 H   (0-5)  /hpf


 


Urine WBC  178 H   (0-5)  /hpf


 


Urine WBC Clumps  Moderate H   (None)  /hpf


 


Amorphous Sediment  Rare H   (None)  /hpf


 


Urine Bacteria  Many H   (None)  /hpf


 


Hyaline Casts  54 H   (0-2)  /lpf


 


Urine Mucus  Rare H   (None)  /hpf








                      Microbiology - Last 24 Hours (Table)











 05/10/21 04:35 Urine Culture - Preliminary





 Urine,Voided 














Assessment and Plan


Assessment: 





ASSESSMENT





Septic shock


Acute hypercapnic and hypoxic respiratory failure


Sepsis secondary to UTI versus infected sacral decubitus stage III ulcer


Metabolic encephalopathy


Chronic congestive heart failure with ejection fraction of 20-25% status post 

AICD/pacemaker


Type 2 diabetes mellitus


Diabetic neuropathy


Lewy body dementia


Persistent atrial fibrillation


Hypertension


Hyperlipidemia


Chronic debility


Chronic stage III sacral decubitus ulcer


Multiple joint osteoarthritis


GERD


COPD and previous smoker


Moderate to severe pulmonary hypertension


Moderate to severe tricuspid and mitral regurgitation


Obesity with BMI of 30.9





PLAN: Patient is being given IV fluids and pressor support for septic shock.  He

has been started on ceftriaxone and vancomycin empirically pending urine and 

blood cultures.  Continue with BiPAP for ventilatory support.  Patient's INR is 

4.2 today, we'll hold off Coumadin.  Overall prognosis is poor.  Currently 

patient is being monitored in the ICU setting.  Family to be updated on the 

patient's status and to discuss CODE STATUS.  Further recommendations to follow 

depending on the progress of the patient.

## 2021-05-10 NOTE — XR
EXAM:

  XR Chest, 1 View

 

CLINICAL HISTORY:

unresponsive

 

TECHNIQUE:

  Frontal view of the chest.

 

COMPARISON: Yesterday 

 

FINDINGS:

  Lungs:  Mild diffuse airspace opacities throughout right lung.  Lungs 

are slightly underinflated.

  Pleural space: Cannot rule out small bilateral pleural effusions.  No 

pneumothorax.

  Heart:  Cardiomegaly.

  Bones/joints: No acute findings.

  Vasculature:  Calcified aorta

  Tubes, lines and devices:  Left pacer is again noted.

 

IMPRESSION:     

Lung findings suggest asymmetrical right pulmonary edema

## 2021-05-10 NOTE — P.EN
A team note





Activated at 3:49 am for concerns of decreased level of consciousness and 

hypotension.  Discussed the case with the RN and reviewed the chart. The patient

who was admitted for altering level of consciousness with episodes of 

unresponsiveness and possible seizure, was noted to be be unresponsive. Vitals 

at evaluation: BP 70/46, P 76, T 97.8, SpO2 89% on 2L, RR 11 breaths/min





General: Non-toxic, in no acute distress, appears stated age, obese


HEENT: NC/AT, anicteric sclerae, moist conjunctiva, no lid-lag, pupils pinpoint 

bhakti and non-reactive


Cardiovascular: S1/S2 wnl, no murmurs, rubs, or gallops


Lungs: Clear to auscultation, normal respiratory effort, no accessory muscle use


Abdominal: Soft, non-tender, non-distended, no guarding, rebound, or rigidity


Skin: Warm, dry, large sacral decubitus ulcer, unstageable with minimal 

surrounding erythema and no drainage


Extremities: No edema or contractures


Psychiatric: Opens eyes to stimuli but does not make eye contact, not following 

any directions, nonverbal


Neuro: Unable to perform





Assessment/Plan





Unresponsiveness with hypotension


-Chart reviewed including neurology evaluation on 5/8.  The patient has a long-

standing history of similar episodes of unresponsiveness and his seroquel dose 

was recently decreased


-Concerns regarding bradypnea and hypotension


-Will obtain ABG to assess for need for additional ventilatory support and 

Hypercapnia


-Concerned for sepsis and will obtain UA and CXR. Low suspicion of ulcer as 

primary source


-Additional workup including CBC, CMP, Magnesium, and Lactate ordered

## 2021-05-11 VITALS — TEMPERATURE: 98.6 F

## 2021-05-11 LAB
ALBUMIN SERPL-MCNC: 3.1 G/DL (ref 3.5–5)
ALP SERPL-CCNC: 117 U/L (ref 38–126)
ALT SERPL-CCNC: 15 U/L (ref 4–49)
ANION GAP SERPL CALC-SCNC: 10 MMOL/L
AST SERPL-CCNC: 37 U/L (ref 17–59)
BASOPHILS # BLD AUTO: 0.1 K/UL (ref 0–0.2)
BASOPHILS NFR BLD AUTO: 1 %
BUN SERPL-SCNC: 40 MG/DL (ref 9–20)
CALCIUM SPEC-MCNC: 7.9 MG/DL (ref 8.4–10.2)
CHLORIDE SERPL-SCNC: 106 MMOL/L (ref 98–107)
CO2 SERPL-SCNC: 26 MMOL/L (ref 22–30)
EOSINOPHIL # BLD AUTO: 0 K/UL (ref 0–0.7)
EOSINOPHIL NFR BLD AUTO: 0 %
ERYTHROCYTE [DISTWIDTH] IN BLOOD BY AUTOMATED COUNT: 4.17 M/UL (ref 4.3–5.9)
ERYTHROCYTE [DISTWIDTH] IN BLOOD: 16.2 % (ref 11.5–15.5)
GLUCOSE BLD-MCNC: 102 MG/DL (ref 75–99)
GLUCOSE BLD-MCNC: 138 MG/DL (ref 75–99)
GLUCOSE BLD-MCNC: 86 MG/DL (ref 75–99)
GLUCOSE SERPL-MCNC: 133 MG/DL (ref 74–99)
HCT VFR BLD AUTO: 46.7 % (ref 39–53)
HGB BLD-MCNC: 13.6 GM/DL (ref 13–17.5)
INR PPP: >10 (ref ?–1.2)
LYMPHOCYTES # SPEC AUTO: 1.8 K/UL (ref 1–4.8)
LYMPHOCYTES NFR SPEC AUTO: 11 %
MAGNESIUM SPEC-SCNC: 2.1 MG/DL (ref 1.6–2.3)
MCH RBC QN AUTO: 32.8 PG (ref 25–35)
MCHC RBC AUTO-ENTMCNC: 29.2 G/DL (ref 31–37)
MCV RBC AUTO: 112.2 FL (ref 80–100)
MONOCYTES # BLD AUTO: 1.1 K/UL (ref 0–1)
MONOCYTES NFR BLD AUTO: 7 %
NEUTROPHILS # BLD AUTO: 12.8 K/UL (ref 1.3–7.7)
NEUTROPHILS NFR BLD AUTO: 79 %
PLATELET # BLD AUTO: 249 K/UL (ref 150–450)
POTASSIUM SERPL-SCNC: 5 MMOL/L (ref 3.5–5.1)
PROT SERPL-MCNC: 6 G/DL (ref 6.3–8.2)
PT BLD: 112.4 SEC (ref 9–12)
SODIUM SERPL-SCNC: 142 MMOL/L (ref 137–145)
WBC # BLD AUTO: 16.2 K/UL (ref 3.8–10.6)

## 2021-05-11 RX ADMIN — SPIRONOLACTONE SCH: 25 TABLET, FILM COATED ORAL at 09:16

## 2021-05-11 RX ADMIN — NOREPINEPHRINE BITARTRATE SCH MLS/HR: 1 INJECTION, SOLUTION, CONCENTRATE INTRAVENOUS at 05:36

## 2021-05-11 RX ADMIN — INSULIN ASPART SCH: 100 INJECTION, SOLUTION INTRAVENOUS; SUBCUTANEOUS at 16:30

## 2021-05-11 RX ADMIN — INSULIN ASPART SCH: 100 INJECTION, SOLUTION INTRAVENOUS; SUBCUTANEOUS at 17:02

## 2021-05-11 RX ADMIN — INSULIN ASPART SCH: 100 INJECTION, SOLUTION INTRAVENOUS; SUBCUTANEOUS at 06:35

## 2021-05-11 RX ADMIN — COLLAGENASE SANTYL SCH APPLIC: 250 OINTMENT TOPICAL at 10:04

## 2021-05-11 RX ADMIN — INSULIN ASPART SCH: 100 INJECTION, SOLUTION INTRAVENOUS; SUBCUTANEOUS at 21:10

## 2021-05-11 RX ADMIN — PIPERACILLIN AND TAZOBACTAM SCH MLS/HR: 3; .375 INJECTION, POWDER, FOR SOLUTION INTRAVENOUS at 17:04

## 2021-05-11 RX ADMIN — FINASTERIDE SCH: 5 TABLET, FILM COATED ORAL at 19:59

## 2021-05-11 RX ADMIN — NOREPINEPHRINE BITARTRATE SCH MLS/HR: 1 INJECTION, SOLUTION, CONCENTRATE INTRAVENOUS at 19:59

## 2021-05-11 RX ADMIN — FAMOTIDINE SCH MG: 10 INJECTION, SOLUTION INTRAVENOUS at 10:04

## 2021-05-11 RX ADMIN — PIPERACILLIN AND TAZOBACTAM SCH MLS/HR: 3; .375 INJECTION, POWDER, FOR SOLUTION INTRAVENOUS at 10:03

## 2021-05-11 RX ADMIN — LEVOTHYROXINE SODIUM ANHYDROUS SCH MCG: 100 INJECTION, POWDER, LYOPHILIZED, FOR SOLUTION INTRAVENOUS at 06:23

## 2021-05-11 NOTE — P.PN
Subjective


Progress Note Date: 05/11/21


Principal diagnosis: 


 Acute hypoxic and hypercapnic respiratory failure related to sepsis/septic 

shock





76-year-old white male patient, a resident of a [facility] of [place], with 

past medical history of diabetes mellitus type 2, chronic A. fib, chronic 

systolic CHF with EF of 20-25% status post AICD/pacemaker insertion, 

hypertension, hyperlipidemia, osteoarthritis, BPH, chronic sacral decubitus 

ulcer stage III, Lewy body dementia, former smoker who was brought into the hosp

ital on 05/07/2021 for evaluation of altered mental status.  Patient apparently 

was found unresponsive by the nursing home staff, in the EMS he received 1 dose 

of Narcan with no response.  Blood sugar was 101, patient had no reported 

fevers, no vomiting or diarrhea.  Brain CT showed atrophy and no acute 

intracranial process.  Chest x-ray in the emergency department showed 

cardiomegaly, no acute cardiopulmonary process.  Initial blood work showed white

count of 9.0, hemoglobin of 11.8, INR was 1.7, sodium was 140, potassium is 3.3,

CO2 was 30, BUN of 34 creatinine was 1.25, LFTs are within normal limits, 

ammonia level was less than 9, troponin was 0.031, urinalysis initially showed 

no definite sign of infection, urine drug screen was positive only for tricyclic

anti-depressants, patient tested negative for influenza A and B, RSV and 

coronavirus.  Neurology consultation was obtained, please refer to their 

consultation note, ID service was following regards to stage III decubitus 

ulcer, however patient was not on any systemic antibiotic therapy.  On 

05/10/2021 a rapid response team was called to the bedside for concern of 

decreased level of consciousness and hypotension.  Patient was given 500 mL 

bolus for a blood pressure of 70/46, she was started on norepinephrine infusion,

stat blood gas was obtained showing pO2 of 70, pCO2 of 93, and pH of 7.18.  

Patient was placed on Noninvasive positive pressure ventilation, AVAPS mode with

tidal volume of 400, rate of 12, EPAP of 5, FiO2 of 40%.  Subsequent blood gases

showed some improvement, with pO2 at 82, pCO2 of 76 and pH of 7.25.  Today's 

chest x-ray shows mild diffuse airspace opacities throughout the right lung, 

suggesting asymmetrical right pulmonary edema.  Urinalysis shows evidence of 

urinary tract infection, urine cultures have been sent, blood cultures have been

sent.  Patient remains afebrile, remains hypotensive, clinically he appears very

dry, and he suspected to be septic possibly related to acute urinary tract 

infection, or possibly pneumonia.  This morning's blood work shows with a token 

10.2, hemoglobin of 12.6, INR is 4.2, sodium is 144, potassium is 4.9, chloride 

is 104, CO2 36, BUN is 32, creatinine is 1.68, lactic acid is 1.0.  Patient is 

very lethargic, remains on BiPAP support.  Patient is oliguric, and has produced

0-5 mL of urine in the last few hours.





On 05/11/2021 patient seen in follow-up in the intensive care unit, he is 

currently more awake, and responsive, he has remained on BIPAP, on AVAPS mode 

with target tidal volume of 400, rate of 12, minimal pressure of 8, maximum 

pressure of 20, EPAP of 5, and FiO2 of 40%.  He is achieving tidal volumes of 

approximately 250-300.  Nevertheless he appears to be more responsive on today's

exam, he was given a trial on nasal cannula, currently on 7 L high flow nasal 

cannula, breathing comfortably, and maintaining saturations at around 96%, he is

on 0.9 at 10 mL per hour, levophed is at 7 mics per minute and this is being 

weaned down.  He is on Zosyn and vancomycin for sepsis possibly related to stage

III decubitus ulcer infection and urinary tract infection.  His wound culture is

showing gram-negative bacilli, urine culture has been sent and is pending at 

this time.  Overnight has had no fevers.  He remains in a paced rhythm, with u

nderlying A. fib, yesterday he received a total of 1.5 L in fluid boluses.  

Still remains on small amount of norepinephrine, today's chest x-ray shows 

improved interstitium, bilateral infiltrates and small effusions persist.  

Overall oxygenation seems to be fair, and patient is tolerating being off BiPAP 

support.  No nausea or vomiting, no abdominal pain, no complaints of cough or 

chest pain.  Remains on Zosyn, and patient was given a dose of vancomycin.  

Mentation has much improved today, patient is oriented 2.  The labs have been 

reviewed, blood cell, 16.2, hemoglobin is 13.6, INR today is greater than 10, 

and patient's Coumadin remains on hold, electrolytes are within normal limits, 

renal profile has worsened and BUN is up to 40, and creatinine is 2.19, LFTs are

within normal limits.  Yesterday patient's family came in and they expressed a 

wish to consult hospice for palliative care, patient's daughter is driving in 

from out of state today and she supposed to come in with the patient's wife 

today, and the plan is to transfer the patient home with hospice per family 

wishes








Objective





- Vital Signs


Vital signs: 


                                   Vital Signs











Temp  98.2 F   05/11/21 08:00


 


Pulse  109 H  05/11/21 11:00


 


Resp  6 L  05/11/21 11:00


 


BP  93/71   05/11/21 11:00


 


Pulse Ox  98   05/11/21 11:00








                                 Intake & Output











 05/10/21 05/11/21 05/11/21





 18:59 06:59 18:59


 


Intake Total 2554.000 768.219 7961


 


Output Total 42 80 65


 


Balance 2512.000 725.764 0990


 


Weight  100.5 kg 100.5 kg


 


Intake:   


 


  IV 2300 210 1110


 


    Magnesium Sulfate-D5w Pmx 200  





    1 gm In Dextrose/Water 1   





    100ml.bag @ 100 mls/hr   





    IVPB Q1H [facility] Rx#:   





    [id number]   


 


    Piperacillin-Tazobactam 3  100 100





    .375 gm In Sodium   





    Chloride 0.9% 100 ml @ 25   





    mls/hr IVPB Q8HR [facility] Rx#   





    :[id number]   


 


    Sodium Chloride 0.9% 1,  110 10





    000 ml @ 50 mls/hr IV .   





    Q20H [facility] Rx#:[id number]   


 


    Sodium Chloride 0.9% 500 1500  500





    ml @ 999 mls/hr IV .Q31M   





    ONE Rx#:[id number]   


 


    Vancomycin 1,750 mg In 500  500





    Sodium Chloride 0.9% 500   





    ml 500 ml @ 167 mls/hr   





    IVPB Q24H [facility] Rx#:   





    [id number]   


 


    cefTRIAXone 1 gm In 100  





    Sodium Chloride 0.9% 50   





    ml @ 100 mls/hr IVPB   





    Q24HR [facility] Rx#:[id number]   


 


  Intake, IV Titration 254.000 485.591 





  Amount   


 


    Norepinephrine 4 mg In 254.000 485.591 





    Sodium Chloride 0.9% 250   





    ml @ 0.05 MCG/KG/MIN 19.   





    907 mls/hr IV .[id number]   





    [facility] Rx#:[id number]   


 


Output:   


 


  Urine 42 80 65


 


Other:   


 


  Voiding Method Indwelling Catheter Indwelling Catheter Indwelling Catheter














- Exam


 GENERAL EXAM: Patient is more awake he is currently on NPPV on AVAPS mode with 

tidal volume of 400 ml, rate of 12, Min EPAP 5, Min Pressure 8, Max Pressure 20,

Fio2 40%


ecomfortable in no apparent distress.


HEAD: Normocephalic/atraumatic.


EYES: Normal reaction of pupils, equal size.  Conjunctiva pink, sclera white.


NOSE: Clear with pink turbinates.


THROAT: No erythema or exudates.


NECK: No masses, no JVD, no thyroid enlargement, no adenopathy.


CHEST: No chest wall deformity.  Symmetrical expansion. 


LUNGS: Equal air entry with no crackles, wheeze, rhonchi or dullness.


CVS: Regular rate and rhythm, normal S1 and S2, no gallops, no murmurs, no rubs


ABDOMEN: Soft, nontender.  No hepatosplenomegaly, normal bowel sounds, no 

guarding or rigidity.


EXTREMITIES: No clubbing, no edema, no cyanosis, 2+ pulses and upper and lower 

extremities.


MUSCULOSKELETAL: Muscle strength and tone normal.


SPINE: No scoliosis or deformity


SKIN: No rashes


CENTRAL NERVOUS SYSTEM: Awake and alert  No focal deficits, tone is normal in 

all 4 extremities.








- Labs


CBC & Chem 7: 


                                 05/11/21 03:43





                                 05/11/21 03:52


Labs: 


                  Abnormal Lab Results - Last 24 Hours (Table)











  05/10/21 05/10/21 05/10/21 Range/Units





  14:20 18:22 20:14 


 


WBC     (3.8-10.6)  k/uL


 


RBC     (4.30-5.90)  m/uL


 


MCV     (80.0-100.0)  fL


 


MCHC     (31.0-37.0)  g/dL


 


RDW     (11.5-15.5)  %


 


Neutrophils #     (1.3-7.7)  k/uL


 


Monocytes #     (0-1.0)  k/uL


 


Macrocytosis     


 


PT     (9.0-12.0)  sec


 


INR     (<1.2)  


 


BUN  35 H    (9-20)  mg/dL


 


Creatinine  1.76 H    (0.66-1.25)  mg/dL


 


Glucose  116 H    (74-99)  mg/dL


 


POC Glucose (mg/dL)   108 H  109 H  (75-99)  mg/dL


 


Calcium  8.1 L    (8.4-10.2)  mg/dL


 


Total Protein     (6.3-8.2)  g/dL


 


Albumin     (3.5-5.0)  g/dL














  05/11/21 05/11/21 05/11/21 Range/Units





  03:43 03:43 03:52 


 


WBC  16.2 H    (3.8-10.6)  k/uL


 


RBC  4.17 L    (4.30-5.90)  m/uL


 


MCV  112.2 H    (80.0-100.0)  fL


 


MCHC  29.2 L    (31.0-37.0)  g/dL


 


RDW  16.2 H    (11.5-15.5)  %


 


Neutrophils #  12.8 H    (1.3-7.7)  k/uL


 


Monocytes #  1.1 H    (0-1.0)  k/uL


 


Macrocytosis  Marked A    


 


PT   112.4 H   (9.0-12.0)  sec


 


INR   >10.0 H*   (<1.2)  


 


BUN    40 H  (9-20)  mg/dL


 


Creatinine    2.19 H  (0.66-1.25)  mg/dL


 


Glucose    133 H  (74-99)  mg/dL


 


POC Glucose (mg/dL)     (75-99)  mg/dL


 


Calcium    7.9 L  (8.4-10.2)  mg/dL


 


Total Protein    6.0 L  (6.3-8.2)  g/dL


 


Albumin    3.1 L  (3.5-5.0)  g/dL














  05/11/21 Range/Units





  06:25 


 


WBC   (3.8-10.6)  k/uL


 


RBC   (4.30-5.90)  m/uL


 


MCV   (80.0-100.0)  fL


 


MCHC   (31.0-37.0)  g/dL


 


RDW   (11.5-15.5)  %


 


Neutrophils #   (1.3-7.7)  k/uL


 


Monocytes #   (0-1.0)  k/uL


 


Macrocytosis   


 


PT   (9.0-12.0)  sec


 


INR   (<1.2)  


 


BUN   (9-20)  mg/dL


 


Creatinine   (0.66-1.25)  mg/dL


 


Glucose   (74-99)  mg/dL


 


POC Glucose (mg/dL)  138 H  (75-99)  mg/dL


 


Calcium   (8.4-10.2)  mg/dL


 


Total Protein   (6.3-8.2)  g/dL


 


Albumin   (3.5-5.0)  g/dL








                      Microbiology - Last 24 Hours (Table)











 05/10/21 14:56 Gram Stain - Preliminary





 Buttock Wound Culture - Preliminary





    Gram Neg Bacilli


 


 05/10/21 Unknown Anaerobic Culture - Preliminary





 Coccyx 


 


 05/10/21 04:35 Urine Culture - Preliminary





 Urine,Voided 














Assessment and Plan


Plan: 


 Assessment:





#1.  Acute hypoxic and hypercapnic respiratory failure related to sepsis/septic 

shock.  On 05/11/2021 patient is tolerating high flow nasal cannula at 7 L, his 

been taken off the BiPAP support, continue O2 saturations above 95% on 7 L high 

flow





#2.  Acute septic shock related to acute urinary tract infection and possibility

of pneumonia is not entirely excluded.  Patient tested negative for COVID-19, 

RSV and influenza A and B.  Possibility of aspiration versus healthcare acquired

pneumonia is not excluded





#3.  Acute kidney injury related to ATN





#4.  Sacral wound stage III, present on admission





#5.  Altered mental status related to acute sepsis.  Brain CT showed no acute 

intracranial process





#6.  Chronic systolic CHF, with an EF of 20-25% status post AICD/pacemaker 

placement





#7.  History of diabetes mellitus type 2 with diabetic neuropathy





#8. Lewy body dementia with history of major cognitive impairment





#9.  History of A. fib on Coumadin, today INR is supratherapeutic, and Coumadin 

is on hold





#10.  Hypertension





#11.  Hyperlipidemia





#12.  Chronic medical debility, and patient is a resident of a local [facility] 

[facility]





Plan:





Continue weaning norepinephrine


We'll give additional 1 L fluid bolus in 500 mL increments


Patient is being given high flow nasal cannula trial and he is tolerating it 

well so far


Mentation has improved, he is awake and alert, oriented 3 on today's exam


Today's labs have been reviewed


Continue holding Coumadin


Continue GI and DVT prophylaxis


Patient is a full code right now however told the patient's family consulted hos

pice, and was to transfer the patient home with hospice possibly today


Until then continue supportive care


May give trials of clear liquid diet


Antibiotics per ID service


Cultures have been reviewed


Prognosis is guarded





I performed a history & physical examination of the patient and discussed their 

management with my nurse practitioner, Gwen Almaraz.  I reviewed the nurse 

practitioner's note and agree with the documented findings and plan of care.  

Lung sounds are positive for diminished breath sounds.  The findings and the 

impression was discussed with the patient.  I attest to the documentation by the

nurse practitioner. 





Time with Patient: Greater than 30

## 2021-05-11 NOTE — P.CRDCN
History of Present Illness


History of present illness: 





HISTORY OF PRESENTING ILLNESS


This is a pleasant 76-year-old  male past medical history significant 

for chronic systolic heart failure s/p BiV ICD, paroxysmal atrial fibrillation 

on coumadin, ischemic cardiomyopathy, coronary artery disease s/p bypass 

grafting, hypertension, dyslipidemia and diabetes mellitus. He follows in the 

office with Dr. Addison. We have been asked to see in consultation for heart

failure. He presented to the hospital with symptoms of altered mental status.  

He is currently being treated for sepsis secondary to UTI.  He was transferred 

to the intensive care unit yesterday secondary to worsening mental status, 

hypotension and worsening respiratory status.  He required BiPAP support.  He is

currently maintaining oxygen saturation on nasal cannula.  He is awake but does 

not communicate appropriately.  His eyes are open but he does not converse.  His

breathing is stable.  He has in no respiratory distress.  EKG on arrival reveals

ventricular paced rhythm.  He continues to be paced on telemetry.  Chest x-ray 

this morning reveals improved interstitium from previous study with ongoing 

interstitial edema noted bilateral infiltrates and small effusion persist.  

Laboratory data reviewed, WBC 16.2, hemoglobin 13.6, platelets 249, INR greater 

than 10, sodium 142, potassium 5.0, creatinine 2.19, magnesium 2.1, pCO2 76, pH 

7.25. 24-hour urine output with meeks in place only 122 ml with dark urine 

noted. Current cardiac medications include Lasix 40 mg daily, Imdur 30 mg daily,

Toprol 50 mg twice a day, Aldactone 25 mg daily, warfarin 2.5 mg daily and 

lisinopril 5 mg daily.  He is currently requiring Levophed for blood pressure 

support.  Most recent echocardiogram obtained in the office 2021 

revealed impaired LV systolic function with ejection fraction 20-25%, severely 

dilated left atrium, moderately dilated right atrium, mild aortic regurgitation,

moderate mitral regurgitation, moderate to severe tricuspid regurgitation and se

patrick pulmonary hypertension with RVSP of 76. 





REVIEW OF SYSTEMS


At the time of my exam:


Unable to obtain accurate review of systems secondary to altered mental status.





PHYSICAL EXAMINATION


Blood pressure 93/71 heart rate 109 afebrile and maintaining oxygen saturation 

on nasal cannula.


CONSTITUTIONAL: No apparent distress. 


HEENT: Head is normocephalic. Pupils are equal, round. Sclerae anicteric. Mucous

membranes of the mouth are moist.  No JVD. No carotid bruit.


CHEST EXAMINATION: Bibasilar rales, no wheezes or rhonchi. No chest wall 

tenderness is noted on palpation or with deep breathing. 


HEART EXAMINATION: Regular rate and rhythm. S1, S2 heard. Systolic ejection 

murmur at the base, no gallops or rub.


ABDOMEN: Soft, nontender. Positive bowel sounds.


EXTREMITIES: 2+ peripheral pulses, left lower extremity 1+ pitting edema, no 

edema on the right and no calf tenderness.


NEUROLOGIC EXAMINATION: Patient is awake but not communicating or answering 

questions appropriately.





ASSESSMENT


Altered mental status secondary to septic shock


Sepsis


Paroxysmal atrial fibrillation


Supratherapeutic INR


Acute on chronic kidney disease


Chronic systolic heart failure s/p BiV ICD


Coronary artery disease s/p bypass grafting


Hypercapnic respiratory failure


Hypertension


Dyslipidemia


Diabetes mellitus





PLAN


Hold coumadin. FFP ordered by intensivist. 


Repeat PT/INR tomorrow. 


Initiate dobutamine along with levophed at 2.5 mcg/kg/min to increase perfusion.




Document accurate intake and output along with daily weights. 


Cautious fluid administration.


Further recommendations to follow based on clinical course. 


Thank you kindly for this consultation. 





Nurse Practitioner note has been reviewed, I agree with a documented findings 

and plan of care.  Patient was seen and examined.











Past Medical History


Past Medical History: Atrial Fibrillation, Heart Failure, Dementia, Diabetes 

Mellitus, Eye Disorder, GERD/Reflux, Hyperlipidemia, Hypertension, Memory Impai

rment, Myocardial Infarction (MI), Musculoskeletal Disorder, Neurologic 

Disorder, Prostate Disorder, Syncope, Thyroid Disorder


Additional Past Medical History / Comment(s): Pt recently admitted to [facility] on 

21 with acute encephalopathy/AMS/hypoxia.  Other hx:  NIDDM type II, 

neuropathy bilateral feet, chronic sacral ulcer, ischemic cardiomyopathy, Lewy 

body dementia, parkinson with hallucinations,  guillian barre, past large 

bullous diabetic snehal fluid filled sac R leg which burst/resolved, BPH, 

constipation, incontinence, vitamin deficiency, tinnitis, hypothyroid.


Last Myocardial Infarction Date:: 


History of Any Multi-Drug Resistant Organisms: None Reported


Past Surgical History: AICD, Heart Catheterization, Pacemaker


Additional Past Surgical History / Comment(s):  PTCA, TEEs, cardioversions, 

AICD  originally and upgraded  to biV, bilateral cataract removals, 

colonoscopy.


Past Anesthesia/Blood Transfusion Reactions: No Reported Reaction


Date of Last Stent Placement:: 


Type of Cardiac Device: Permanent Pacemaker, AICD


Device Placement Date:: original /upgrade 2021


Past Psychological History: Anxiety


Additional Psychological History / Comment(s): [facility] [place] d/t weakness.  Pt states he has not been able to participate with 

physical therapy d/t weakness.


Smoking Status: Former smoker


Past Alcohol Use History: Rare


Additional Past Alcohol Use History / Comment(s): Pt started smoking in  and

quit in 


Past Drug Use History: None Reported





- Past Family History


  ** Mother


Additional Family Medical History / Comment(s): SEVERE LEG EDEMA





  ** Father


Additional Family Medical History / Comment(s): Father  during a heart 

procedure.





Medications and Allergies


                                Home Medications











 Medication  Instructions  Recorded  Confirmed  Type


 


Famotidine [Pepcid] 20 mg PO DAILY@0800 18 History


 


Finasteride [Proscar] 5 mg PO HS 18 History


 


Furosemide [Lasix] 40 mg PO DAILY@0800 18 History


 


Warfarin [Coumadin] 2.5 mg PO DAILY@1600 11/15/18 05/07/21 History


 


QUEtiapine [SEROquel] 50 mg PO HS 21 History


 


rOPINIRole HCL [Requip] 0.25 mg PO HS 21 History


 


Levothyroxine Sodium 25 mcg PO HS 21 History


 


bisacodyL [Dulcolax] 5 mg PO DAILY PRN 21 History


 


Isosorbide Mononitrate ER [Imdur] 30 mg PO DAILY@0600 21 History


 


Multivitamins, Thera [Multivitamin 1 tab PO DAILY@0800 21 History





(formulary)]    


 


Spironolactone [Aldactone] 25 mg PO DAILY@0600 21 History


 


lisinopriL [Zestril] 5 mg PO HS@ #30 tab 21 Rx


 


Healthshake 1 cap PO BID@0700,1730 21 History


 


Metoprolol Succinate (ER) [Toprol 50 mg PO BID@0800,2000 21 

History





Xl]    


 


glipiZIDE [Glucotrol] 2.5 mg PO BID@0800,1600 21 History


 


traMADol HCl [Ultram] 50 mg PO Q6H PRN 21 History








                                    Allergies











Allergy/AdvReac Type Severity Reaction Status Date / Time


 


No Known Allergies Allergy   Verified 21 10:54














Physical Exam


Vitals: 


                                   Vital Signs











  Temp Pulse Resp BP Pulse Ox


 


 21 08:05      97


 


 21 07:00   98  20  121/74  93 L


 


 21 06:30   105 H  24  118/78  94 L


 


 21 06:00   103 H  12  116/82  96


 


 21 05:30   110 H  16  128/52  96


 


 21 05:00   99  14  120/65  95


 


 21 04:30   101 H  18  112/45  95


 


 21 04:00  97.5 F L  96  18  88/49  95


 


 21 03:30   97  14  100/40  95


 


 21 03:00   93  17  106/49  95


 


 21 02:30   99  16  118/54  97


 


 21 02:00   101 H  12  113/62  97


 


 21 01:30   96  13  101/55  96


 


 21 01:00   98  12  103/69  96


 


 21 00:34   100  13  95/59  95


 


 21 00:30   98  20  86/72  95


 


 21 00:00  97.6 F  100  13  110/54  94 L


 


 05/10/21 23:30   105 H  12  108/68  96


 


 05/10/21 23:00   94  12  106/54  96


 


 05/10/21 22:30   105 H  13  103/60  95


 


 05/10/21 22:15   105 H  15  112/62  96


 


 05/10/21 22:00   98  12  90/69  97


 


 05/10/21 21:45   95  12  108/45  95


 


 05/10/21 21:30   94  15  119/65  95


 


 05/10/21 21:15   97  13  110/60  96


 


 05/10/21 21:00   102 H  13  104/66  95


 


 05/10/21 20:45   98  13  114/67  95


 


 05/10/21 20:30   101 H  14  118/54  95


 


 05/10/21 20:15   103 H  15  100/65  93 L


 


 05/10/21 20:00  97.5 F L  107 H  16  111/52  95


 


 05/10/21 19:45   117 H  13  91/67  95


 


 05/10/21 19:30   100  14  110/66  93 L


 


 05/10/21 19:15   101 H  19  108/61  94 L


 


 05/10/21 19:00   98  13  95/39  95


 


 05/10/21 18:45   96  13  74/62  94 L


 


 05/10/21 18:30   109 H  14  100/69  95


 


 05/10/21 18:15   105 H  15  113/69  94 L


 


 05/10/21 18:00   103 H  12  99/56  94 L


 


 05/10/21 17:45   107 H  11 L  109/59  93 L


 


 05/10/21 17:30   101 H  8 L  90/45  93 L


 


 05/10/21 17:15   107 H  9 L  115/56  89 L


 


 05/10/21 17:00   102 H  15  71/48  89 L


 


 05/10/21 16:45   101 H  16  84/49  93 L


 


 05/10/21 16:30   102 H  12  108/50  94 L


 


 05/10/21 16:15   101 H  12  103/58  94 L


 


 05/10/21 16:00   102 H  12  122/70  95


 


 05/10/21 15:45   103 H  16  108/60  97


 


 05/10/21 15:30   101 H  16  108/82  95


 


 05/10/21 15:15   107 H  18  109/63  97


 


 05/10/21 15:00   105 H  17  112/67  96


 


 05/10/21 14:45   108 H  16  92/59  95


 


 05/10/21 14:30   108 H  12  100/80  94 L


 


 05/10/21 14:15   112 H  23  101/69  95


 


 05/10/21 14:00   112 H  12  98/60  94 L


 


 05/10/21 13:45   112 H  13  104/62  93 L


 


 05/10/21 13:30   106 H  12  86/60  93 L


 


 05/10/21 13:15   101 H  13  115/62  94 L


 


 05/10/21 13:00   108 H  11 L  96/71  95


 


 05/10/21 12:45   103 H  11 L  92/67  92 L


 


 05/10/21 12:30   104 H  11 L  106/67  93 L


 


 05/10/21 12:15   115 H  17  105/55  94 L


 


 05/10/21 12:00  97.4 F L  108 H  14  97/77  95


 


 05/10/21 11:45   117 H  11 L  115/73  92 L


 


 05/10/21 11:30   105 H  12  87/74  95


 


 05/10/21 11:15   112 H  13  94/60  92 L


 


 05/10/21 11:00   100  7 L  84/55  93 L


 


 05/10/21 10:45   105 H  12  83/53  93 L


 


 05/10/21 10:30   95  18  98/62  86 L


 


 05/10/21 10:15   101 H  16  97/64  95


 


 05/10/21 10:00   94  21  82/47  94 L


 


 05/10/21 09:45   94  16  89/55  93 L


 


 05/10/21 09:30   93  15  107/68  91 L


 


 05/10/21 09:15   107 H  10 L  119/75  88 L








                                Intake and Output











 05/10/21 05/11/21 05/11/21





 22:59 06:59 14:59


 


Intake Total 1243.854 665.591 10


 


Output Total 45 60 10


 


Balance 1198.854 605.591 0


 


Intake:   


 


  IV 1030 180 10


 


    Piperacillin-Tazobactam 3  100 





    .375 gm In Sodium   





    Chloride 0.9% 100 ml @ 25   





    mls/hr IVPB Q8HR [facility] Rx#   





    :[id number]   


 


    Sodium Chloride 0.9% 1, 30 80 10





    000 ml @ 50 mls/hr IV .   





    Q20H [facility] Rx#:[id number]   


 


    Sodium Chloride 0.9% 500 1000  





    ml @ 999 mls/hr IV .Q31M   





    [facility] Rx#:[id number]   


 


  Intake, IV Titration 213.854 485.591 





  Amount   


 


    Norepinephrine 4 mg In 213.854 485.591 





    Sodium Chloride 0.9% 250   





    ml @ 0.05 MCG/KG/MIN 19.   





    907 mls/hr IV .[id number]   





    [facility] Rx#:[id number]   


 


Output:   


 


  Urine 45 60 10


 


Other:   


 


  Voiding Method Indwelling Catheter Indwelling Catheter 


 


  Weight  100.5 kg 














Results





                                 21 03:43





                                 21 03:52


                                 Cardiac Enzymes











  21 Range/Units





  03:52 


 


AST  37  (17-59)  U/L








                                   Coagulation











  21 Range/Units





  03:43 


 


PT  112.4 H  (9.0-12.0)  sec








                                       CBC











  21 Range/Units





  03:43 


 


WBC  16.2 H  (3.8-10.6)  k/uL


 


RBC  4.17 L  (4.30-5.90)  m/uL


 


Hgb  13.6  (13.0-17.5)  gm/dL


 


Hct  46.7  (39.0-53.0)  %


 


Plt Count  249  (150-450)  k/uL








                          Comprehensive Metabolic Panel











  05/10/21 05/11/21 Range/Units





  14:20 03:52 


 


Sodium  144  142  (137-145)  mmol/L


 


Potassium  4.6  5.0  (3.5-5.1)  mmol/L


 


Chloride  106  106  ()  mmol/L


 


Carbon Dioxide  30  26  (22-30)  mmol/L


 


BUN  35 H  40 H  (9-20)  mg/dL


 


Creatinine  1.76 H  2.19 H  (0.66-1.25)  mg/dL


 


Glucose  116 H  133 H  (74-99)  mg/dL


 


Calcium  8.1 L  7.9 L  (8.4-10.2)  mg/dL


 


AST   37  (17-59)  U/L


 


ALT   15  (4-49)  U/L


 


Alkaline Phosphatase   117  ()  U/L


 


Total Protein   6.0 L  (6.3-8.2)  g/dL


 


Albumin   3.1 L  (3.5-5.0)  g/dL








                               Current Medications











Generic Name Dose Route Start Last Admin





  Trade Name Freq  PRN Reason Stop Dose Admin


 


Bisacodyl  5 mg  21 16:57 





  Bisacodyl 5 Mg Tablet.Dr  PO  





  DAILY PRN  





  Constipation  


 


Collagenase  1 applic  21 11:45  05/10/21 11:55





  Collagenase 250 Unit/Gm Ointment 30 Gm Tube  TOPICAL   1 applic





  DAILY BARON   Administration


 


Famotidine  20 mg  05/10/21 09:00  05/10/21 09:10





  Famotidine 20 Mg/2 Ml Vial  IV   20 mg





  DAILY BARON   Administration


 


Finasteride  5 mg  21 21:00  05/10/21 20:12





  Finasteride 5 Mg Tab  PO   Not Given





  [facility]  


 


Norepinephrine Bitartrate 4 mg  254 mls @ 19.907 mls/hr  05/10/21 06:15  

21 05:36





  / Sodium Chloride  IV   0.1 mcg/kg/min





  .[id number] BARON   39.815 mls/hr





    Administration





  Protocol  





  0.05 MCG/KG/MIN  


 


Piperacillin Sod/Tazobactam  100 mls @ 25 mls/hr  21 00:00  05/10/21 23:57





  Sod 3.375 gm/ Sodium Chloride  IVPB   25 mls/hr





  Q8HR [facility]   Administration


 


Insulin Aspart  0 unit  21 17:30  21 06:35





  Insulin Aspart (Novolog) 100 Unit/Ml Vial  SQ   Not Given





  AC[facility]  





  Protocol  


 


Levothyroxine Sodium  12.5 mcg  05/10/21 09:00  21 06:23





  Levothyroxine Ivp 100 Mcg/5 Ml Vial  IV   12.5 mcg





  DAILY@0630 [facility]   Administration


 


Miscellaneous Information  1 each  21 16:59 





  Warfarin Per Pharmacy  MISCELLANE  





  AS DIRECTED PRN  





  Per Protocol  





  Protocol  


 


Ondansetron HCl  4 mg  21 15:59  21 16:13





  Ondansetron 4 Mg/2 Ml Vial  IVP   4 mg





  Q6HR PRN   Administration





  Nausea And Vomiting  


 


Ropinirole HCl  0.25 mg  21 21:00  05/10/21 20:12





  Ropinirole Hcl 0.25 Mg Tab  PO   Not Given





  HS [facility]  


 


Spironolactone  25 mg  21 09:00  05/10/21 10:24





  Spironolactone 25 Mg Tab  PO   Not Given





  DAILY@0900 [facility]  


 


Tramadol HCl  50 mg  21 16:57  21 15:07





  Tramadol 50 Mg Tab  PO   50 mg





  Q6H PRN   Administration





  Pain  


 


Warfarin Sodium  0 mg  21 18:00 





  Warfarin 0.5 Mg Tab  PO  21 18:01 





  ONCE@1800 ONE  








                                Intake and Output











 05/10/21 05/11/21 05/11/21





 22:59 06:59 14:59


 


Intake Total 1243.854 665.591 10


 


Output Total 45 60 10


 


Balance 1198.854 605.591 0


 


Intake:   


 


  IV 1030 180 10


 


    Piperacillin-Tazobactam 3  100 





    .375 gm In Sodium   





    Chloride 0.9% 100 ml @ 25   





    mls/hr IVPB Q8HR [facility] Rx#   





    :[id number]   


 


    Sodium Chloride 0.9% 1, 30 80 10





    000 ml @ 50 mls/hr IV .   





    Q20H [facility] Rx#:[id number]   


 


    Sodium Chloride 0.9% 500 1000  





    ml @ 999 mls/hr IV .Q31M   





    [facility] Rx#:[id number]   


 


  Intake, IV Titration 213.854 485.591 





  Amount   


 


    Norepinephrine 4 mg In 213.854 485.591 





    Sodium Chloride 0.9% 250   





    ml @ 0.05 MCG/KG/MIN 19.   





    907 mls/hr IV .[id number]   





    [facility] Rx#:[id number]   


 


Output:   


 


  Urine 45 60 10


 


Other:   


 


  Voiding Method Indwelling Catheter Indwelling Catheter 


 


  Weight  100.5 kg 








                                        





                                 21 03:43 





                                 21 03:52

## 2021-05-11 NOTE — XR
EXAMINATION TYPE: XR chest 1V portable

 

DATE OF EXAM: 5/11/2021

 

COMPARISON: 5/10/2020

 

HISTORY: Shortness

 

TECHNIQUE: Single frontal view of the chest is obtained.

 

FINDINGS:  Bibasilar consolidation and small effusions. Heart enlarged. Cardiac device noted. Atheros
clerotic change aorta. Interstitial pattern noted. Tiny granuloma right upper lobe. No pneumothorax.

 

IMPRESSION:  

1. Improved interstitium correlate for interstitial edema or interstitial pneumonitis. Bilateral infi
ltrate and small effusion persist.

## 2021-05-11 NOTE — PN
PROGRESS NOTE



DATE OF SERVICE:

05/11/2021



This 76-year-old gentleman who was admitted acute hypoxic hypercarbic 
respiratory

failure also had features of septic shock. The patient also had multiple 
decubitus

ulcers. Multiple consultants are following the patient closely.  The patient is 
being

closely monitored in the ICU.  The patient was tested for COVID-19 and  
influenza

A and B. Possibility of aspiration pneumonia is also being considered.  The 
patient

also has a sacral decubitus wound, stage III. The patient continues to be 
hypotensive.

A hospice informational visit is being arranged per the family's preference. 
Also the

patient has multiple complex medical issues. Past medical history reviewed. 
Review of

systems could not be taken.



CURRENT MEDICATIONS:

Dulcolax, Pepcid, Proscar, Synthroid. Doses are reviewed.



PHYSICAL EXAMINATION:

Pulse is 140, blood pressure 120/56 respiration 9, temperature 98.6, pulse ox 
94% on 5

L.

HEENT: Conjunctivae normal.

NECK: No jugular venous distention.

CARDIOVASCULAR SYSTEM:  S1, S2 muffled.

RESPIRATORY SYSTEM: Breath sounds diminished at the bases.  A few scattered 
rhonchi and

crackles.

ABDOMEN: Soft, non-tender.

LEGS: No edema. No swelling.

NERVOUS SYSTEM: Diffusely weak.



LABS:

INR is more than 10 and creatinine is 2.19.



ASSESSMENT:

1. Acute urinary tract infection or infected sacral decubitus ulcer, stage III 
ulcer

    with severe sepsis and septic shock.

2. Severe intractable hypotension.

3. Acute hypoxic hypercarbic respiratory failure secondary to sepsis.

4. Change in mental status, acute metabolic encephalopathy.

5. Chronic congestive heart failure with ejection fraction 20% to 25%, status 
post ICD

    pacemaker.

6. Diabetes mellitus, type 2.

7. Diabetic peripheral neuropathy.

8. Lewy body dementia.

9. Persistent atrial fibrillation.

10.Hypertension.

11.Hyperlipidemia.

12.Chronic debility.

13.Chronic stage III sacral decubitus ulcer.

14.Multiple-joint degenerative joint disease.

15.Gastroesophageal reflux disease.

16.Chronic obstructive pulmonary disease history.

17.Moderate to severe pulmonary hypertension.

18. history of tricuspid and mitral regurgitation.

19.Obesity with body mass index of 30.9.

20.Coumadin coagulopathy.

21.NO CODE, NO CPR, NO VENT.



RECOMMENDATIONS AND DISCUSSION:

I recommend to continue current medications, continue with the monitoring, 
symptomatic

treatment. I had a detailed discussion with the family, and a hospice 
consultation has

been arranged.  The patient's wife is going to check with the family about 
whether they

will be able to handle the patient's post-discharge needs and requirements. Will

continue to monitor. Discussed with Case Management and discharge planning team.

Closely follow with intensive care team, including Dr. Cardoso. Further 
recommendations

to follow. See orders for further details. Dr. Reyes is also following the 
patient

closely.  The cultures are showing Gram-negative bacilli in the urine and wound.
The

final ID is pending at this time.





MMODL / IJN: [id number] / Job#: [id number]

MTDD

## 2021-05-11 NOTE — P.PN
Subjective


Progress Note Date: 05/11/21


Patient was seen at bedside and in the morning he was on BiPAP and the was taken

off and so far he is tolerating it okay.


He continues to be on norepinephrine.


No new neurological complaints per the nurse.








Objective





- Vital Signs


Vital signs: 


                                   Vital Signs











Temp  98.8 F   05/11/21 13:47


 


Pulse  116 H  05/11/21 13:47


 


Resp  16   05/11/21 13:47


 


BP  101/42   05/11/21 13:47


 


Pulse Ox  93 L  05/11/21 13:47








                                 Intake & Output











 05/10/21 05/11/21 05/11/21





 18:59 06:59 18:59


 


Intake Total 2554.000 591.596 1421


 


Output Total 42 80 95


 


Balance 2512.000 754.359 0681


 


Weight  100.5 kg 100.5 kg


 


Intake:   


 


  IV 2300 210 1110


 


    Magnesium Sulfate-D5w Pmx 200  





    1 gm In Dextrose/Water 1   





    100ml.bag @ 100 mls/hr   





    IVPB Q1H [facility] Rx#:   





    [id number]   


 


    Piperacillin-Tazobactam 3  100 100





    .375 gm In Sodium   





    Chloride 0.9% 100 ml @ 25   





    mls/hr IVPB Q8HR [facility] Rx#   





    :[id number]   


 


    Sodium Chloride 0.9% 1,  110 10





    000 ml @ 50 mls/hr IV .   





    Q20H [facility] Rx#:[id number]   


 


    Sodium Chloride 0.9% 500 1500  500





    ml @ 999 mls/hr IV .Q31M   





    [facility] Rx#:[id number]   


 


    Vancomycin 1,750 mg In 500  500





    Sodium Chloride 0.9% 500   





    ml 500 ml @ 167 mls/hr   





    IVPB Q24H [facility] Rx#:   





    [id number]   


 


    cefTRIAXone 1 gm In 100  





    Sodium Chloride 0.9% 50   





    ml @ 100 mls/hr IVPB   





    Q24HR [facility] Rx#:[id number]   


 


  Intake, IV Titration 254.000 485.591 





  Amount   


 


    Norepinephrine 4 mg In 254.000 485.591 





    Sodium Chloride 0.9% 250   





    ml @ 0.05 MCG/KG/MIN 19.   





    907 mls/hr IV .[id number]   





    [facility] Rx#:[id number]   


 


  Blood Product   324


 


    Ffp 24 Cpd  Unit   324





    [id number]   


 


Output:   


 


  Urine 42 80 95


 


Other:   


 


  Voiding Method Indwelling Catheter Indwelling Catheter Indwelling Catheter














- Exam


Gen.: Does not seem in acute distress.





Neurological examination


Limited because of his cooperation/condition.


Mental status: The patient is drowsy but awakeable to voice.  He is oriented to 

self only.  Follows simple commands (squeezing hands to commands and moving 

ankles).  


Cranial nerves: Pupils are equal at 3 mm and reactive to light.  Could not 

assess visual field because of his condition.  There is a mild right exotropia. 

No facial weakness noted.  No dysarthria from limited language.


Rest of cranial nerves could not be assessed.


Motor: Strength was Hard to assess because of his cooperation.  His able to have

a good hand  bilaterally.  Moving ankles (dorsiflexion/plantar flexion) 2/5 

bilaterally.


Sensation: Could not assess.








- Labs


CBC & Chem 7: 


                                 05/11/21 03:43





                                 05/11/21 03:52


Labs: 


                  Abnormal Lab Results - Last 24 Hours (Table)











  05/10/21 05/10/21 05/10/21 Range/Units





  14:20 18:22 20:14 


 


WBC     (3.8-10.6)  k/uL


 


RBC     (4.30-5.90)  m/uL


 


MCV     (80.0-100.0)  fL


 


MCHC     (31.0-37.0)  g/dL


 


RDW     (11.5-15.5)  %


 


Neutrophils #     (1.3-7.7)  k/uL


 


Monocytes #     (0-1.0)  k/uL


 


Macrocytosis     


 


PT     (9.0-12.0)  sec


 


INR     (<1.2)  


 


BUN  35 H    (9-20)  mg/dL


 


Creatinine  1.76 H    (0.66-1.25)  mg/dL


 


Glucose  116 H    (74-99)  mg/dL


 


POC Glucose (mg/dL)   108 H  109 H  (75-99)  mg/dL


 


Calcium  8.1 L    (8.4-10.2)  mg/dL


 


Total Protein     (6.3-8.2)  g/dL


 


Albumin     (3.5-5.0)  g/dL














  05/11/21 05/11/21 05/11/21 Range/Units





  03:43 03:43 03:52 


 


WBC  16.2 H    (3.8-10.6)  k/uL


 


RBC  4.17 L    (4.30-5.90)  m/uL


 


MCV  112.2 H    (80.0-100.0)  fL


 


MCHC  29.2 L    (31.0-37.0)  g/dL


 


RDW  16.2 H    (11.5-15.5)  %


 


Neutrophils #  12.8 H    (1.3-7.7)  k/uL


 


Monocytes #  1.1 H    (0-1.0)  k/uL


 


Macrocytosis  Marked A    


 


PT   112.4 H   (9.0-12.0)  sec


 


INR   >10.0 H*   (<1.2)  


 


BUN    40 H  (9-20)  mg/dL


 


Creatinine    2.19 H  (0.66-1.25)  mg/dL


 


Glucose    133 H  (74-99)  mg/dL


 


POC Glucose (mg/dL)     (75-99)  mg/dL


 


Calcium    7.9 L  (8.4-10.2)  mg/dL


 


Total Protein    6.0 L  (6.3-8.2)  g/dL


 


Albumin    3.1 L  (3.5-5.0)  g/dL














  05/11/21 Range/Units





  06:25 


 


WBC   (3.8-10.6)  k/uL


 


RBC   (4.30-5.90)  m/uL


 


MCV   (80.0-100.0)  fL


 


MCHC   (31.0-37.0)  g/dL


 


RDW   (11.5-15.5)  %


 


Neutrophils #   (1.3-7.7)  k/uL


 


Monocytes #   (0-1.0)  k/uL


 


Macrocytosis   


 


PT   (9.0-12.0)  sec


 


INR   (<1.2)  


 


BUN   (9-20)  mg/dL


 


Creatinine   (0.66-1.25)  mg/dL


 


Glucose   (74-99)  mg/dL


 


POC Glucose (mg/dL)  138 H  (75-99)  mg/dL


 


Calcium   (8.4-10.2)  mg/dL


 


Total Protein   (6.3-8.2)  g/dL


 


Albumin   (3.5-5.0)  g/dL








                      Microbiology - Last 24 Hours (Table)











 05/10/21 10:02 Blood Culture - Preliminary





 Blood    No Growth after 24 hours


 


 05/10/21 10:04 Blood Culture - Preliminary





 Blood    No Growth after 24 hours


 


 05/10/21 14:56 Gram Stain - Preliminary





 Buttock Wound Culture - Preliminary





    Gram Neg Bacilli


 


 05/10/21 Unknown Anaerobic Culture - Preliminary





 Coccyx 


 


 05/10/21 04:35 Urine Culture - Preliminary





 Urine,Voided 














Assessment and Plan


Assessment: 





Altered mental status due to septic shock (from UTI)--slight improvement today 

compared to yesterday


Acute septic shock related due to acute urinary tract infection.  Pneumonia is 

not exclude


Unresponsiveness/loss of consciousness without reported tonic-clonic movements 

or postictal state (this is reportedly a third episode)


Possible autonomic dysfunction secondary to Lewy body dementia


Acute kidney injury--worsening


History of Lewy body dementia with bilateral lower extremity weakness


Reported sudden onset paraplegia/inability to ambulate following surgery for 

pacemaker placement


Acute hypoxic hypercapnic respiratory failure


Chronic atrial fibrillation on Coumadin with INR supratherapeutic today.


Chronic systolic congestive heart failure with ejection fraction of 20-25% 

status post AICD/pacemaker





Plan: 


  


Check orthostatic vitals once patient is stable.


physical therapy and occupational therapy are consulted.


Because of the episode of unresponsiveness, I ordered a routine EEG.  I will not

start the patient on antiepileptic drug unless there is applicable discharge 

procedure on the EEG.





Cardiology team is consulted.





Will defer the rest of medical management to the ICU/Primary team.





Per the nurse, the family decided to make the patient home with hospice.  

Therefore EEG was not performed.


Neurology will sign off.  Please reconsult if needed.





The plan is discussed with the patient's nurse.





Sd Ramsey MD


Neuro-Hospitalist











Time with Patient: Less than 30

## 2021-05-11 NOTE — PN
PROGRESS NOTE



DATE OF SERVICE:

05/11/2021



REASON FOR FOLLOW UP:

1. Sacral pressure ulcer.

2. Possible pneumonia.



INTERVAL HISTORY:

The patient is currently afebrile.  The patient is slightly more awake and alert. The

patient has been taken off the BiPAP.  He is currently on 5 L nasal cannula.  Denies

any chest pain, worsening cough.  No abdominal pain.



PHYSICAL EXAMINATION:

Blood pressure 93/71 with a pulse of 109, temperature is 98.2.  He is 98% on 5 L nasal

cannula.

GENERAL DESCRIPTION: An elderly male lying in bed in no distress.

RESPIRATORY SYSTEM: Unlabored breathing, decreased intensity of breath sounds, no

wheeze.

HEART: S1, S2.  Regular rate and rhythm.

ABDOMEN: Soft, no tenderness.



LABS:

Hemoglobin 13.1, white count 16.2, BUN of 40, creatinine is 2.19 and ______ Culture now

showing Gram-negative bacilli.



DIAGNOSTIC IMPRESSION AND PLAN:

1. Patient with sacral pressure ulcer with no evidence of any cellulitis clinically.

    Local treatment with Santyl.  Culture now showing gram-negative. Patient is covered

    with Zosyn.

2. Patient with respiratory distress, concern for possible pneumonitis/aspiration.

    Covered with Zosyn.  However, plan for possible hospice which may be approved for

    him. Continue supportive care.  Family at the bedside. Questions answered.





MMODL / IJN: [id number] / Job#: [id number]

## 2021-05-12 VITALS — HEART RATE: 98 BPM | DIASTOLIC BLOOD PRESSURE: 66 MMHG | SYSTOLIC BLOOD PRESSURE: 95 MMHG | RESPIRATION RATE: 10 BRPM

## 2021-05-12 LAB
ALBUMIN SERPL-MCNC: 2.8 G/DL (ref 3.5–5)
ALP SERPL-CCNC: 98 U/L (ref 38–126)
ALT SERPL-CCNC: 15 U/L (ref 4–49)
ANION GAP SERPL CALC-SCNC: 10 MMOL/L
AST SERPL-CCNC: 34 U/L (ref 17–59)
BASOPHILS # BLD AUTO: 0.1 K/UL (ref 0–0.2)
BASOPHILS NFR BLD AUTO: 1 %
BUN SERPL-SCNC: 50 MG/DL (ref 9–20)
CALCIUM SPEC-MCNC: 7.7 MG/DL (ref 8.4–10.2)
CHLORIDE SERPL-SCNC: 109 MMOL/L (ref 98–107)
CO2 SERPL-SCNC: 26 MMOL/L (ref 22–30)
EOSINOPHIL # BLD AUTO: 0.1 K/UL (ref 0–0.7)
EOSINOPHIL NFR BLD AUTO: 1 %
ERYTHROCYTE [DISTWIDTH] IN BLOOD BY AUTOMATED COUNT: 3.5 M/UL (ref 4.3–5.9)
ERYTHROCYTE [DISTWIDTH] IN BLOOD: 15.9 % (ref 11.5–15.5)
GLUCOSE SERPL-MCNC: 121 MG/DL (ref 74–99)
HCT VFR BLD AUTO: 37.9 % (ref 39–53)
HGB BLD-MCNC: 12.2 GM/DL (ref 13–17.5)
INR PPP: 7 (ref ?–1.2)
LYMPHOCYTES # SPEC AUTO: 1.3 K/UL (ref 1–4.8)
LYMPHOCYTES NFR SPEC AUTO: 11 %
MCH RBC QN AUTO: 34.7 PG (ref 25–35)
MCHC RBC AUTO-ENTMCNC: 32.1 G/DL (ref 31–37)
MCV RBC AUTO: 108.2 FL (ref 80–100)
MONOCYTES # BLD AUTO: 1 K/UL (ref 0–1)
MONOCYTES NFR BLD AUTO: 8 %
NEUTROPHILS # BLD AUTO: 9.3 K/UL (ref 1.3–7.7)
NEUTROPHILS NFR BLD AUTO: 78 %
PLATELET # BLD AUTO: 190 K/UL (ref 150–450)
POTASSIUM SERPL-SCNC: 4 MMOL/L (ref 3.5–5.1)
PROT SERPL-MCNC: 5.5 G/DL (ref 6.3–8.2)
PT BLD: 68.7 SEC (ref 9–12)
SODIUM SERPL-SCNC: 145 MMOL/L (ref 137–145)
WBC # BLD AUTO: 12 K/UL (ref 3.8–10.6)

## 2021-05-12 RX ADMIN — INSULIN ASPART SCH: 100 INJECTION, SOLUTION INTRAVENOUS; SUBCUTANEOUS at 08:26

## 2021-05-12 RX ADMIN — SPIRONOLACTONE SCH: 25 TABLET, FILM COATED ORAL at 08:32

## 2021-05-12 RX ADMIN — PIPERACILLIN AND TAZOBACTAM SCH MLS/HR: 3; .375 INJECTION, POWDER, FOR SOLUTION INTRAVENOUS at 01:27

## 2021-05-12 RX ADMIN — COLLAGENASE SANTYL SCH APPLIC: 250 OINTMENT TOPICAL at 10:58

## 2021-05-12 RX ADMIN — NOREPINEPHRINE BITARTRATE SCH MLS/HR: 1 INJECTION, SOLUTION, CONCENTRATE INTRAVENOUS at 06:25

## 2021-05-12 RX ADMIN — LEVOTHYROXINE SODIUM ANHYDROUS SCH MCG: 100 INJECTION, POWDER, LYOPHILIZED, FOR SOLUTION INTRAVENOUS at 06:28

## 2021-05-12 RX ADMIN — FAMOTIDINE SCH MG: 10 INJECTION, SOLUTION INTRAVENOUS at 10:59

## 2021-05-12 RX ADMIN — PIPERACILLIN AND TAZOBACTAM SCH MLS/HR: 3; .375 INJECTION, POWDER, FOR SOLUTION INTRAVENOUS at 09:50

## 2021-05-12 NOTE — XR
EXAMINATION TYPE: XR chest 1V portable

 

DATE OF EXAM: 5/12/2021

 

HISTORY: Shortness of breath.

 

COMPARISON: 5/11/2021

 

TECHNIQUE: Single view of the chest is submitted.

 

FINDINGS:

Demonstrated are scattered senescent parenchymal change.  

 

Basilar infiltrates and/or atelectasis and small effusions are noted. Mild pulmonary venous congestio
n.

 

The heart is stable.

 

Hilar and mediastinal structures are within normal limits.  

 

Degenerative changes are seen of the dorsal spine. 

 

 IMPRESSION: 

 

1.  Pulmonary venous congestion with basilar atelectasis and/or infiltrates and small effusions with 
continued cardiomegaly.

## 2021-05-12 NOTE — P.PN
Subjective


Progress Note Date: 05/12/21


Principal diagnosis: 


 Acute hypoxic and hypercapnic respiratory failure related to sepsis/septic 

shock





76-year-old white male patient, a resident of a [place] of [place], with 

past medical history of diabetes mellitus type 2, chronic A. fib, chronic 

systolic CHF with EF of 20-25% status post AICD/pacemaker insertion, 

hypertension, hyperlipidemia, osteoarthritis, BPH, chronic sacral decubitus 

ulcer stage III, Lewy body dementia, former smoker who was brought into the hosp

ital on 05/07/2021 for evaluation of altered mental status.  Patient apparently 

was found unresponsive by the nursing home staff, in the EMS he received 1 dose 

of Narcan with no response.  Blood sugar was 101, patient had no reported 

fevers, no vomiting or diarrhea.  Brain CT showed atrophy and no acute 

intracranial process.  Chest x-ray in the emergency department showed 

cardiomegaly, no acute cardiopulmonary process.  Initial blood work showed white

count of 9.0, hemoglobin of 11.8, INR was 1.7, sodium was 140, potassium is 3.3,

CO2 was 30, BUN of 34 creatinine was 1.25, LFTs are within normal limits, 

ammonia level was less than 9, troponin was 0.031, urinalysis initially showed 

no definite sign of infection, urine drug screen was positive only for tricyclic

anti-depressants, patient tested negative for influenza A and B, RSV and 

coronavirus.  Neurology consultation was obtained, please refer to their 

consultation note, ID service was following regards to stage III decubitus 

ulcer, however patient was not on any systemic antibiotic therapy.  On 

05/10/2021 a rapid response team was called to the bedside for concern of 

decreased level of consciousness and hypotension.  Patient was given 500 mL 

bolus for a blood pressure of 70/46, she was started on norepinephrine infusion,

stat blood gas was obtained showing pO2 of 70, pCO2 of 93, and pH of 7.18.  

Patient was placed on Noninvasive positive pressure ventilation, AVAPS mode with

tidal volume of 400, rate of 12, EPAP of 5, FiO2 of 40%.  Subsequent blood gases

showed some improvement, with pO2 at 82, pCO2 of 76 and pH of 7.25.  Today's 

chest x-ray shows mild diffuse airspace opacities throughout the right lung, 

suggesting asymmetrical right pulmonary edema.  Urinalysis shows evidence of 

urinary tract infection, urine cultures have been sent, blood cultures have been

sent.  Patient remains afebrile, remains hypotensive, clinically he appears very

dry, and he suspected to be septic possibly related to acute urinary tract 

infection, or possibly pneumonia.  This morning's blood work shows with a token 

10.2, hemoglobin of 12.6, INR is 4.2, sodium is 144, potassium is 4.9, chloride 

is 104, CO2 36, BUN is 32, creatinine is 1.68, lactic acid is 1.0.  Patient is 

very lethargic, remains on BiPAP support.  Patient is oliguric, and has produced

0-5 mL of urine in the last few hours.





On 05/11/2021 patient seen in follow-up in the intensive care unit, he is 

currently more awake, and responsive, he has remained on BIPAP, on AVAPS mode 

with target tidal volume of 400, rate of 12, minimal pressure of 8, maximum 

pressure of 20, EPAP of 5, and FiO2 of 40%.  He is achieving tidal volumes of 

approximately 250-300.  Nevertheless he appears to be more responsive on today's

exam, he was given a trial on nasal cannula, currently on 7 L high flow nasal 

cannula, breathing comfortably, and maintaining saturations at around 96%, he is

on 0.9 at 10 mL per hour, levophed is at 7 mics per minute and this is being 

weaned down.  He is on Zosyn and vancomycin for sepsis possibly related to stage

III decubitus ulcer infection and urinary tract infection.  His wound culture is

showing gram-negative bacilli, urine culture has been sent and is pending at 

this time.  Overnight has had no fevers.  He remains in a paced rhythm, with u

nderlying A. fib, yesterday he received a total of 1.5 L in fluid boluses.  

Still remains on small amount of norepinephrine, today's chest x-ray shows 

improved interstitium, bilateral infiltrates and small effusions persist.  

Overall oxygenation seems to be fair, and patient is tolerating being off BiPAP 

support.  No nausea or vomiting, no abdominal pain, no complaints of cough or 

chest pain.  Remains on Zosyn, and patient was given a dose of vancomycin.  

Mentation has much improved today, patient is oriented 2.  The labs have been 

reviewed, blood cell, 16.2, hemoglobin is 13.6, INR today is greater than 10, 

and patient's Coumadin remains on hold, electrolytes are within normal limits, 

renal profile has worsened and BUN is up to 40, and creatinine is 2.19, LFTs are

within normal limits.  Yesterday patient's family came in and they expressed a 

wish to consult hospice for palliative care, patient's daughter is driving in 

from out of state today and she supposed to come in with the patient's wife 

today, and the plan is to transfer the patient home with hospice per family 

wishes





On 05/12/2021 patient seen in follow-up in intensive care unit, he is resting in

bed, he is currently on 5 L of oxygen his pulse ox is 99%, still requiring 

vasopressor support, currently on norepinephrine at 0.05 mics per kilo per 

minute, and plan was seen at a rate of 10 ML per hour, is in A. fib with RVR 

with a rate of 120 PACED, he has remained oliguric Overnight, producing a 5-10 

ML of urine per hour.  His chest x-ray today shows pulmonary venous congestion 

with basilar atelectasis and small effusions with continued cardiomegaly.  He 

has been alternating between high flow cannula and BiPAP for dyspnea and cough, 

today's labs have been reviewed showing white blood cell, 12, hemoglobin of 

12.2, his INR is up to 7.0, sodium is 145, potassium is 4.0, chloride is 109, 

BUN of 50, creatinine is 2.38, a worsened from yesterday, his LFTs are within 

normal limits.  His wound culture on his sacrum showed gram-negative bacilli, 

fungal culture is pending, his urine culture was positive for Klebsiella 

oxytoca.  Had no fevers overnight, he seems to be abnormal lethargic compared to

yesterday's exam, staff reports hospice is currently already on the case, and 

equipment is being delivered to the patient's house to transition the patient 

home with hospice.  She is expected to be transferred home later today.








Objective





- Vital Signs


Vital signs: 


                                   Vital Signs











Temp  98.6 F   05/12/21 08:00


 


Pulse  104 H  05/12/21 11:00


 


Resp  20   05/12/21 11:00


 


BP  123/73   05/12/21 11:00


 


Pulse Ox  99   05/12/21 11:00








                                 Intake & Output











 05/11/21 05/12/21 05/12/21





 18:59 06:59 18:59


 


Intake Total 2112 407.318 161.047


 


Output Total 180 185 70


 


Balance 1932 222.318 91.047


 


Weight 100.5 kg  


 


Intake:   


 


  IV 1210 150 100


 


    Piperacillin-Tazobactam 3 200 150 100





    .375 gm In Sodium   





    Chloride 0.9% 100 ml @ 25   





    mls/hr IVPB Q8HR [facility] Rx#   





    :[id number]   


 


    Sodium Chloride 0.9% 1, 10  





    000 ml @ 50 mls/hr IV .   





    Q20H [facility] Rx#:[id number]   


 


    Sodium Chloride 0.9% 500 500  





    ml @ 999 mls/hr IV .Q31M   





    [facility] Rx#:[id number]   


 


    Vancomycin 1,750 mg In 500  





    Sodium Chloride 0.9% 500   





    ml 500 ml @ 167 mls/hr   





    IVPB Q24H [facility] Rx#:   





    [id number]   


 


  Intake, IV Titration 254 257.318 61.047





  Amount   


 


    Norepinephrine 4 mg In 254 257.318 61.047





    Sodium Chloride 0.9% 250   





    ml @ 0.05 MCG/KG/MIN 19.   





    907 mls/hr IV .[id number]   





    [facility] Rx#:[id number]   


 


  Blood Product 648  


 


    Ffp 24 Cpd  Unit 324  





    [id number]   


 


Output:   


 


  Urine 180 185 70


 


Other:   


 


  Voiding Method Indwelling Catheter Indwelling Catheter Indwelling Catheter














- Exam


 GENERAL EXAM: Patient is more lethargic, currently on 5 L of oxygen and his 

pulse ox of 99%, he is alternating with NPPV on AVAPS mode with tidal volume of 

400 ml, rate of 12, Min EPAP 5, Min Pressure 8, Max Pressure 20, Fio2 40%


ecomfortable in no apparent distress.


HEAD: Normocephalic/atraumatic.


EYES: Normal reaction of pupils, equal size.  Conjunctiva pink, sclera white.


NOSE: Clear with pink turbinates.


THROAT: No erythema or exudates.


NECK: No masses, no JVD, no thyroid enlargement, no adenopathy.


CHEST: No chest wall deformity.  Symmetrical expansion. 


LUNGS: Equal air entry with no crackles, wheeze, rhonchi or dullness.


CVS: Regular rate and rhythm, normal S1 and S2, no gallops, no murmurs, no rubs


ABDOMEN: Soft, nontender.  No hepatosplenomegaly, normal bowel sounds, no 

guarding or rigidity.


EXTREMITIES: No clubbing, no edema, no cyanosis, 2+ pulses and upper and lower 

extremities.


MUSCULOSKELETAL: Muscle strength and tone normal.


SPINE: No scoliosis or deformity


SKIN: No rashes


CENTRAL NERVOUS SYSTEM: Awake and alert  No focal deficits, tone is normal in 

all 4 extremities.








- Labs


CBC & Chem 7: 


                                 05/12/21 03:28





                                 05/12/21 03:28


Labs: 


                  Abnormal Lab Results - Last 24 Hours (Table)











  05/11/21 05/12/21 05/12/21 Range/Units





  20:31 03:28 03:28 


 


WBC   12.0 H   (3.8-10.6)  k/uL


 


RBC   3.50 L   (4.30-5.90)  m/uL


 


Hgb   12.2 L   (13.0-17.5)  gm/dL


 


Hct   37.9 L   (39.0-53.0)  %


 


MCV   108.2 H   (80.0-100.0)  fL


 


RDW   15.9 H   (11.5-15.5)  %


 


Neutrophils #   9.3 H   (1.3-7.7)  k/uL


 


Macrocytosis   Marked A   


 


PT     (9.0-12.0)  sec


 


INR     (<1.2)  


 


Chloride    109 H  ()  mmol/L


 


BUN    50 H  (9-20)  mg/dL


 


Creatinine    2.38 H  (0.66-1.25)  mg/dL


 


Glucose    121 H  (74-99)  mg/dL


 


POC Glucose (mg/dL)  102 H    (75-99)  mg/dL


 


Calcium    7.7 L  (8.4-10.2)  mg/dL


 


Total Protein    5.5 L  (6.3-8.2)  g/dL


 


Albumin    2.8 L  (3.5-5.0)  g/dL














  05/12/21 Range/Units





  03:28 


 


WBC   (3.8-10.6)  k/uL


 


RBC   (4.30-5.90)  m/uL


 


Hgb   (13.0-17.5)  gm/dL


 


Hct   (39.0-53.0)  %


 


MCV   (80.0-100.0)  fL


 


RDW   (11.5-15.5)  %


 


Neutrophils #   (1.3-7.7)  k/uL


 


Macrocytosis   


 


PT  68.7 H  (9.0-12.0)  sec


 


INR  7.0 H*  (<1.2)  


 


Chloride   ()  mmol/L


 


BUN   (9-20)  mg/dL


 


Creatinine   (0.66-1.25)  mg/dL


 


Glucose   (74-99)  mg/dL


 


POC Glucose (mg/dL)   (75-99)  mg/dL


 


Calcium   (8.4-10.2)  mg/dL


 


Total Protein   (6.3-8.2)  g/dL


 


Albumin   (3.5-5.0)  g/dL








                      Microbiology - Last 24 Hours (Table)











 05/10/21 10:02 Blood Culture - Preliminary





 Blood    No Growth after 48 hours


 


 05/10/21 10:04 Blood Culture - Preliminary





 Blood    No Growth after 48 hours


 


 05/10/21 04:35 Urine Culture - Final





 Urine,Voided    Klebsiella oxytoca


 


 05/10/21 14:56 Gram Stain - Preliminary





 Buttock Wound Culture - Preliminary





    Gram Neg Bacilli














Assessment and Plan


Plan: 


 Assessment:





#1.  Acute hypoxic and hypercapnic respiratory failure related to sepsis/septic 

shock.  On 05/11/2021 patient is tolerating high flow nasal cannula at 7 L, his 

been taken off the BiPAP support, continue O2 saturations above 95% on 7 L high 

flow





#2.  Acute septic shock related to acute urinary tract infection and possibility

of pneumonia is not entirely excluded.  Patient tested negative for COVID-19, 

RSV and influenza A and B.  Possibility of aspiration versus healthcare acquired

pneumonia is not excluded.  Urine culture tested positive for Klebsiella oxytoca





#3.  Acute kidney injury related to ATN





#4.  Sacral wound stage III, present on admission





#5.  Altered mental status related to acute sepsis.  Brain CT showed no acute 

intracranial process





#6.  Chronic systolic CHF, with an EF of 20-25% status post AICD/pacemaker 

placement





#7.  History of diabetes mellitus type 2 with diabetic neuropathy





#8. Lewy body dementia with history of major cognitive impairment





#9.  History of A. fib on Coumadin, today INR is supratherapeutic, and Coumadin 

is on hold





#10.  Hypertension





#11.  Hyperlipidemia





#12.  Chronic medical debility, and patient is a resident of a local [facility]





Plan:





Patient may continue alternating between nasal cannula and BiPAP support


We'll wean off the norepinephrine


Continue holding Coumadin


Continue antibiotics


Renal function continues to worsen


Patient remains oliguric


Continue GI and DVT prophylaxis


Hospice consultation has been obtained and patient is in the process of being 

transitioned home under the hospice care


Equipment is being set up with the patient's home


He is expected to transition home with hospice later today


Until then continue supportive treatment





I performed a history & physical examination of the patient and discussed their 

management with my nurse practitioner, Gwen Almaraz.  I reviewed the nurse 

practitioner's note and agree with the documented findings and plan of care.  

Lung sounds are positive for diminished breath sounds.  The findings and the i

mpression was discussed with the patient.  I attest to the documentation by the 

nurse practitioner. 





Time with Patient: Greater than 30

## 2021-05-12 NOTE — P.PN
Subjective





HISTORY OF PRESENTING ILLNESS


This is a pleasant 76-year-old  male past medical history significant 

for chronic systolic heart failure s/p BiV ICD, paroxysmal atrial fibrillation 

on coumadin, ischemic cardiomyopathy, coronary artery disease s/p bypass 

grafting, hypertension, dyslipidemia and diabetes mellitus. He follows in the 

office with Dr. Addison. We have been asked to see in consultation for heart

failure. He presented to the hospital with symptoms of altered mental status.  

He is currently being treated for sepsis secondary to UTI.  He was transferred 

to the intensive care unit yesterday secondary to worsening mental status, 

hypotension and worsening respiratory status.  He required BiPAP support.  He is

currently maintaining oxygen saturation on nasal cannula.  He is awake but does 

not communicate appropriately.  His eyes are open but he does not converse.  His

breathing is stable.  He has in no respiratory distress.  EKG on arrival reveals

ventricular paced rhythm.  He continues to be paced on telemetry.  Chest x-ray 

this morning reveals improved interstitium from previous study with ongoing 

interstitial edema noted bilateral infiltrates and small effusion persist.  

Laboratory data reviewed, WBC 16.2, hemoglobin 13.6, platelets 249, INR greater 

than 10, sodium 142, potassium 5.0, creatinine 2.19, magnesium 2.1, pCO2 76, pH 

7.25. 24-hour urine output with meeks in place only 122 ml with dark urine 

noted. Current cardiac medications include Lasix 40 mg daily, Imdur 30 mg daily,

Toprol 50 mg twice a day, Aldactone 25 mg daily, warfarin 2.5 mg daily and 

lisinopril 5 mg daily.  He is currently requiring Levophed for blood pressure 

support.  Most recent echocardiogram obtained in the office February 2021 

revealed impaired LV systolic function with ejection fraction 20-25%, severely 

dilated left atrium, moderately dilated right atrium, mild aortic regurgitation,

moderate mitral regurgitation, moderate to severe tricuspid regurgitation and 

severe pulmonary hypertension with RVSP of 76. 





05/12/2021


Pt seen and examined sitting up in bed on bipap, he is minimally responsive. 

Blood pressure 123/73 heart rate 104 afebrile and maintaining oxygen saturation 

on bipap.  Laboratory data reviewed, WBC 12, hemoglobin 12.2, platelets 190, INR

7, sodium 145, potassium 4.0, creatinine 2.38.  Dobutamine was discontinued 

yesterday secondary to tachycardia.  His daughter arrived into town yesterday 

and made the decision for him to go home on hospice this afternoon.





PHYSICAL EXAMINATION


CONSTITUTIONAL: Not responding on bipap.


HEENT: Head is normocephalic. Pupils are equal, round. Sclerae anicteric. Mucous

membranes of the mouth are moist.  No JVD. No carotid bruit.


CHEST EXAMINATION: Bibasilar rales, no wheezes or rhonchi. No chest wall 

tenderness is noted on palpation or with deep breathing. 


HEART EXAMINATION: Regular rate and rhythm. S1, S2 heard. Systolic ejection 

murmur at the base, no gallops or rub.


EXTREMITIES: 2+ peripheral pulses, left lower extremity 1+ pitting edema, no 

edema on the right and no calf tenderness.





ASSESSMENT


Altered mental status secondary to septic shock


Sepsis


Paroxysmal atrial fibrillation


Supratherapeutic INR


Acute on chronic kidney disease


Chronic systolic heart failure s/p BiV ICD


Coronary artery disease s/p bypass grafting


Hypercapnic respiratory failure


Hypertension


Dyslipidemia


Diabetes mellitus





PLAN


No further recommendations. 





Nurse Practitioner note has been reviewed, I agree with a documented findings 

and plan of care.  Patient was seen and examined.





Objective





- Vital Signs


Vital signs: 


                                   Vital Signs











Temp  98.6 F   05/12/21 08:00


 


Pulse  104 H  05/12/21 11:00


 


Resp  20   05/12/21 11:00


 


BP  123/73   05/12/21 11:00


 


Pulse Ox  99   05/12/21 11:00








                                 Intake & Output











 05/11/21 05/12/21 05/12/21





 18:59 06:59 18:59


 


Intake Total 2112 407.318 161.047


 


Output Total 180 185 70


 


Balance 1932 222.318 91.047


 


Weight 100.5 kg  


 


Intake:   


 


  IV 1210 150 100


 


    Piperacillin-Tazobactam 3 200 150 100





    .375 gm In Sodium   





    Chloride 0.9% 100 ml @ 25   





    mls/hr IVPB Q8HR [facility] Rx#   





    :[id number]   


 


    Sodium Chloride 0.9% 1, 10  





    000 ml @ 50 mls/hr IV .   





    Q20H [facility] Rx#:[id number]   


 


    Sodium Chloride 0.9% 500 500  





    ml @ 999 mls/hr IV .Q31M   





    ONE Rx#:[id number]   


 


    Vancomycin 1,750 mg In 500  





    Sodium Chloride 0.9% 500   





    ml 500 ml @ 167 mls/hr   





    IVPB Q24H [facility] Rx#:   





    [id number]   


 


  Intake, IV Titration 254 257.318 61.047





  Amount   


 


    Norepinephrine 4 mg In 254 257.318 61.047





    Sodium Chloride 0.9% 250   





    ml @ 0.05 MCG/KG/MIN 19.   





    907 mls/hr IV .[id number]   





    [facility] Rx#:[id number]   


 


  Blood Product 648  


 


    Ffp 24 Cpd  Unit 324  





    [id number]   


 


Output:   


 


  Urine 180 185 70


 


Other:   


 


  Voiding Method Indwelling Catheter Indwelling Catheter Indwelling Catheter














- Labs


CBC & Chem 7: 


                                 05/12/21 03:28





                                 05/12/21 03:28


Labs: 


                  Abnormal Lab Results - Last 24 Hours (Table)











  05/11/21 05/12/21 05/12/21 Range/Units





  20:31 03:28 03:28 


 


WBC   12.0 H   (3.8-10.6)  k/uL


 


RBC   3.50 L   (4.30-5.90)  m/uL


 


Hgb   12.2 L   (13.0-17.5)  gm/dL


 


Hct   37.9 L   (39.0-53.0)  %


 


MCV   108.2 H   (80.0-100.0)  fL


 


RDW   15.9 H   (11.5-15.5)  %


 


Neutrophils #   9.3 H   (1.3-7.7)  k/uL


 


Macrocytosis   Marked A   


 


PT     (9.0-12.0)  sec


 


INR     (<1.2)  


 


Chloride    109 H  ()  mmol/L


 


BUN    50 H  (9-20)  mg/dL


 


Creatinine    2.38 H  (0.66-1.25)  mg/dL


 


Glucose    121 H  (74-99)  mg/dL


 


POC Glucose (mg/dL)  102 H    (75-99)  mg/dL


 


Calcium    7.7 L  (8.4-10.2)  mg/dL


 


Total Protein    5.5 L  (6.3-8.2)  g/dL


 


Albumin    2.8 L  (3.5-5.0)  g/dL














  05/12/21 Range/Units





  03:28 


 


WBC   (3.8-10.6)  k/uL


 


RBC   (4.30-5.90)  m/uL


 


Hgb   (13.0-17.5)  gm/dL


 


Hct   (39.0-53.0)  %


 


MCV   (80.0-100.0)  fL


 


RDW   (11.5-15.5)  %


 


Neutrophils #   (1.3-7.7)  k/uL


 


Macrocytosis   


 


PT  68.7 H  (9.0-12.0)  sec


 


INR  7.0 H*  (<1.2)  


 


Chloride   ()  mmol/L


 


BUN   (9-20)  mg/dL


 


Creatinine   (0.66-1.25)  mg/dL


 


Glucose   (74-99)  mg/dL


 


POC Glucose (mg/dL)   (75-99)  mg/dL


 


Calcium   (8.4-10.2)  mg/dL


 


Total Protein   (6.3-8.2)  g/dL


 


Albumin   (3.5-5.0)  g/dL








                      Microbiology - Last 24 Hours (Table)











 05/10/21 10:02 Blood Culture - Preliminary





 Blood    No Growth after 48 hours


 


 05/10/21 10:04 Blood Culture - Preliminary





 Blood    No Growth after 48 hours


 


 05/10/21 04:35 Urine Culture - Final





 Urine,Voided    Klebsiella oxytoca


 


 05/10/21 14:56 Gram Stain - Preliminary





 Buttock Wound Culture - Preliminary





    Gram Neg Bacilli

## 2021-05-17 NOTE — CDI
Documentation Clarification Form



Date: 05/17/2021 08:42:42 AM

From: Miquel Sanchez

Phone: Tami Chamberlain [phone]

MRN: [id number]

Admit Date: 04/26/2021 12:29:00 PM

Patient Name: Hola Motley

Visit Number: [id number]

Discharge Date:  04/26/2021 03:59:00 PM





ATTENTION: The Clinical Documentation Specialists (CDI) and [facility] Coding Staff 
appreciate your assistance in clarifying documentation. Please respond to the 
clarification below the line at the bottom and electronically sign. The CDI & 
[facility] Coding staff will review the response and follow-up if needed. Please note: 
Queries are made part of the Legal Health Record. If you have any questions, 
please contact the author of this message via ITS.



Dr. Simone Villalta







Encephalopathy is documented in the discharge summary..  Additional 
clarification regarding the cause of encephalopathy is requested. Progress note 
4/26 indicates unresponsiveness probably caused by hypotension. Discharge 
summary does not state the cause of the encephalopathy.



History/Risk Factors: Admitted with AMS



Clinical Indicators: episodes of syncope



Please clarify the cause of the encephalopathy if known:



1.[ ] hypotension

 2. [ ] other-please specify:

3. [ x] unknown

MTDD

## 2023-06-20 NOTE — P.CNPUL
History of Present Illness


Consult date: 05/10/21


Reason for consult: other


Chief complaint: Altered mental status


History of present illness: 


 76-year-old white male patient, a resident of a [facility], with 

past medical history of diabetes mellitus type 2, chronic A. fib, chronic 

systolic CHF with EF of 20-25% status post AICD/pacemaker insertion, hypert

ension, hyperlipidemia, osteoarthritis, BPH, chronic sacral decubitus ulcer 

stage III, Lewy body dementia, former smoker who was brought into the hospital 

on 2021 for evaluation of altered mental status.  Patient apparently was 

found unresponsive by the nursing home staff, in the EMS he received 1 dose of 

Narcan with no response.  Blood sugar was 101, patient had no reported fevers, 

no vomiting or diarrhea.  Brain CT showed atrophy and no acute intracranial 

process.  Chest x-ray in the emergency department showed cardiomegaly, no acute 

cardiopulmonary process.  Initial blood work showed white count of 9.0, 

hemoglobin of 11.8, INR was 1.7, sodium was 140, potassium is 3.3, CO2 was 30, B

UN of 34 creatinine was 1.25, LFTs are within normal limits, ammonia level was 

less than 9, troponin was 0.031, urinalysis initially showed no definite sign of

infection, urine drug screen was positive only for tricyclic anti-depressants, 

patient tested negative for influenza A and B, RSV and coronavirus.  Neurology 

consultation was obtained, please refer to their consultation note, ID service 

was following regards to stage III decubitus ulcer, however patient was not on 

any systemic antibiotic therapy.  On 05/10/2021 a rapid response team was called

to the bedside for concern of decreased level of consciousness and hypotension. 

Patient was given 500 mL bolus for a blood pressure of 70/46, she was started on

norepinephrine infusion, stat blood gas was obtained showing pO2 of 70, pCO2 of 

93, and pH of 7.18.  Patient was placed on Noninvasive positive pressure 

ventilation, AVAPS mode with tidal volume of 400, rate of 12, EPAP of 5, FiO2 of

40%.  Subsequent blood gases showed some improvement, with pO2 at 82, pCO2 of 76

and pH of 7.25.  Today's chest x-ray shows mild diffuse airspace opacities 

throughout the right lung, suggesting asymmetrical right pulmonary edema.  

Urinalysis shows evidence of urinary tract infection, urine cultures have been 

sent, blood cultures have been sent.  Patient remains afebrile, remains 

hypotensive, clinically he appears very dry, and he suspected to be septic 

possibly related to acute urinary tract infection, or possibly pneumonia.  This 

morning's blood work shows with a token 10.2, hemoglobin of 12.6, INR is 4.2, 

sodium is 144, potassium is 4.9, chloride is 104, CO2 36, BUN is 32, creatinine 

is 1.68, lactic acid is 1.0.  Patient is very lethargic, remains on BiPAP 

support.  Patient is oliguric, and has produced 0-5 mL of urine in the last few 

hours.








Review of Systems


All systems: negative


Constitutional: Reports fatigue, Reports lethargy, Denies chills, Denies fever


Eyes: denies blurred vision, denies pain


Ears, nose, mouth and throat: Denies headache, Denies sore throat


Cardiovascular: Denies chest pain, Denies shortness of breath


Respiratory: Denies cough


Gastrointestinal: Denies abdominal pain, Denies diarrhea, Denies nausea, Denies 

vomiting


Musculoskeletal: Denies myalgias


Integumentary: Denies pruritus, Denies rash


Neurological: Reports change in mentation, Denies numbness, Denies weakness


Psychiatric: Denies anxiety, Denies depression


Endocrine: Denies fatigue, Denies weight change





Past Medical History


Past Medical History: Atrial Fibrillation, Heart Failure, Dementia, Diabetes 

Mellitus, Eye Disorder, GERD/Reflux, Hyperlipidemia, Hypertension, Memory 

Impairment, Myocardial Infarction (MI), Musculoskeletal Disorder, Neurologic 

Disorder, Prostate Disorder, Syncope, Thyroid Disorder


Additional Past Medical History / Comment(s): Pt recently admitted to [facility] on 

21 with acute encephalopathy/AMS/hypoxia.  Other hx:  NIDDM type II, ana maria

ropathy bilateral feet, chronic sacral ulcer, ischemic cardiomyopathy, Lewy body

dementia, parkinson with hallucinations,  guillian barre, past large bullous

diabetic snehal fluid filled sac R leg which burst/resolved, BPH, constipation, 

incontinence, vitamin deficiency, tinnitis, hypothyroid.


Last Myocardial Infarction Date:: 


History of Any Multi-Drug Resistant Organisms: None Reported


Past Surgical History: AICD, Heart Catheterization, Pacemaker


Additional Past Surgical History / Comment(s):  PTCA, TEEs, cardioversions, 

AICD  originally and upgraded  to biV, bilateral cataract removals, 

colonoscopy.


Past Anesthesia/Blood Transfusion Reactions: No Reported Reaction


Date of Last Stent Placement:: 


Type of Cardiac Device: Permanent Pacemaker, AICD


Device Placement Date:: original /upgrade 2021


Past Psychological History: Anxiety


Additional Psychological History / Comment(s): Pt recently placed in [facility] 

[facility] [place] d/t weakness.  Pt states he has not been able to participate with 

physical therapy d/t weakness.


Smoking Status: Former smoker


Past Alcohol Use History: Rare


Additional Past Alcohol Use History / Comment(s): Pt started smoking in  and

quit in 


Past Drug Use History: None Reported





- Past Family History


  ** Mother


Additional Family Medical History / Comment(s): SEVERE LEG EDEMA





  ** Father


Additional Family Medical History / Comment(s): Father  during a heart 

procedure.





Medications and Allergies


                                Home Medications











 Medication  Instructions  Recorded  Confirmed  Type


 


Famotidine [Pepcid] 20 mg PO DAILY@0800 18 History


 


Finasteride [Proscar] 5 mg PO HS 18 History


 


Furosemide [Lasix] 40 mg PO DAILY@0800 18 History


 


Warfarin [Coumadin] 2.5 mg PO DAILY@1600 11/15/18 05/07/21 History


 


QUEtiapine [SEROquel] 50 mg PO HS 21 History


 


rOPINIRole HCL [Requip] 0.25 mg PO HS 21 History


 


Levothyroxine Sodium 25 mcg PO HS 21 History


 


bisacodyL [Dulcolax] 5 mg PO DAILY PRN 21 History


 


Isosorbide Mononitrate ER [Imdur] 30 mg PO DAILY@0600 21 History


 


Multivitamins, Thera [Multivitamin 1 tab PO DAILY@0800 21 History





(formulary)]    


 


Spironolactone [Aldactone] 25 mg PO DAILY@0600 21 History


 


lisinopriL [Zestril] 5 mg PO HS@ #30 tab 21 Rx


 


Healthshake 1 cap PO BID@0700,1730 21 History


 


Metoprolol Succinate (ER) [Toprol 50 mg PO BID@0800,2000 21 

History





Xl]    


 


glipiZIDE [Glucotrol] 2.5 mg PO BID@0800,1600 21 History


 


traMADol HCl [Ultram] 50 mg PO Q6H PRN 21 History








                                    Allergies











Allergy/AdvReac Type Severity Reaction Status Date / Time


 


No Known Allergies Allergy   Verified 21 10:54














Physical Exam


Vitals: 


                                   Vital Signs











  Temp Pulse Pulse Pulse Resp BP BP


 


 05/10/21 10:00   94    21  82/47 


 


 05/10/21 09:45   94    16  89/55 


 


 05/10/21 09:30   93    15  107/68 


 


 05/10/21 09:15   107 H    10 L  119/75 


 


 05/10/21 09:00   102 H    11 L  123/84 


 


 05/10/21 08:45   98    15  110/79 


 


 05/10/21 08:30   80    10 L  73/56 


 


 05/10/21 08:15   79    8 L  80/49 


 


 05/10/21 08:00   87    12  89/60 


 


 05/10/21 07:45   80    11 L  89/57 


 


 05/10/21 07:43       


 


 05/10/21 07:30   91    10 L  112/82 


 


 05/10/21 07:15   92    11 L  125/65 


 


 05/10/21 07:00   98    12  114/71 


 


 05/10/21 04:24       


 


 05/10/21 04:18        60/45


 


 05/10/21 03:58       


 


 05/10/21 03:52       


 


 05/10/21 03:48  97.8 F   76    


 


 21 20:48  97.6 F   74   15  


 


 21 20:10  97.5 F L   76   16  


 


 21 15:41  97.6 F   82   20   108/72


 


 21 14:00  97.9 F    87  18  














  BP Pulse Ox


 


 05/10/21 10:00   94 L


 


 05/10/21 09:45   93 L


 


 05/10/21 09:30   91 L


 


 05/10/21 09:15   88 L


 


 05/10/21 09:00   94 L


 


 05/10/21 08:45   88 L


 


 05/10/21 08:30   93 L


 


 05/10/21 08:15   93 L


 


 05/10/21 08:00   91 L


 


 05/10/21 07:45   91 L


 


 05/10/21 07:43   94 L


 


 05/10/21 07:30   92 L


 


 05/10/21 07:15   93 L


 


 05/10/21 07:00   87 L


 


 05/10/21 04:24  74/50 


 


 05/10/21 04:18  


 


 05/10/21 03:58  78/50  96


 


 05/10/21 03:52  70/46 


 


 05/10/21 03:48  62/43 


 


 21 20:48  82/59  89 L


 


 21 20:10  93/85  92 L


 


 21 15:41   92 L


 


 21 14:00  113/64  92 L








                                Intake and Output











 05/09/21 05/10/21 05/10/21





 22:59 06:59 14:59


 


Intake Total   1116.257


 


Output Total  200 5


 


Balance  -200 1111.257


 


Intake:   


 


  IV   1100


 


    Sodium Chloride 0.9% 500   500





    ml @ 999 mls/hr IV .Q31M   





    [facility] Rx#:[id number]   


 


    Vancomycin 1,750 mg In   500





    Sodium Chloride 0.9% 500   





    ml 500 ml @ 167 mls/hr   





    IVPB Q24H [facility]   





    [id number]   


 


    cefTRIAXone 1 gm In   100





    Sodium Chloride 0.9% 50   





    ml @ 100 mls/hr IVPB   





    Q24HR [facility] Rx#:[id number]   


 


  Intake, IV Titration   16.257





  Amount   


 


    Norepinephrine 4 mg In   16.257





    Sodium Chloride 0.9% 250   





    ml @ 0.05 MCG/KG/MIN 19.   





    907 mls/hr IV .[id number]   





    [facility] hospital Rx#:[id number]   


 


Output:   


 


  Urine  200 5


 


Other:   


 


  Voiding Method External Catheter  


 


  Weight  103.4 kg 











 GENERAL EXAM: Patient is quite obtunded, he is currently on NPPV on AVAPS mode 

with tidal volume of 400 ml, rate of 12, Min EPAP 5, Min Pressure 8, Max 

Pressure 20, Fio2 30%


ecomfortable in no apparent distress.


HEAD: Normocephalic/atraumatic.


EYES: Normal reaction of pupils, equal size.  Conjunctiva pink, sclera white.


NOSE: Clear with pink turbinates.


THROAT: No erythema or exudates.


NECK: No masses, no JVD, no thyroid enlargement, no adenopathy.


CHEST: No chest wall deformity.  Symmetrical expansion. 


LUNGS: Equal air entry with no crackles, wheeze, rhonchi or dullness.


CVS: Regular rate and rhythm, normal S1 and S2, no gallops, no murmurs, no rubs


ABDOMEN: Soft, nontender.  No hepatosplenomegaly, normal bowel sounds, no guar

ding or rigidity.


EXTREMITIES: No clubbing, no edema, no cyanosis, 2+ pulses and upper and lower 

extremities.


MUSCULOSKELETAL: Muscle strength and tone normal.


SPINE: No scoliosis or deformity


SKIN: No rashes


CENTRAL NERVOUS SYSTEM: Lethargic.  No focal deficits, tone is normal in all 4 

extremities.














Results





- Laboratory Findings


CBC and BMP: 


                                 05/10/21 04:00





                                 05/10/21 04:00


ABG











ABG pH  7.25  (7.35-7.45)  L  05/10/21  06:08    


 


ABG pCO2  76 mmHg (35-45)  H*  05/10/21  06:08    


 


ABG pO2  82 mmHg ()  L  05/10/21  06:08    


 


ABG O2 Saturation  96.0 % (94-97)   05/10/21  06:08    





PT/INR, D-dimer











PT  40.4 sec (9.0-12.0)  H  05/10/21  04:00    


 


INR  4.2  (<1.2)  H  05/10/21  04:00    








Abnormal lab findings: 


                                  Abnormal Labs











  21





  10:45 10:45 10:45


 


WBC   


 


RBC  3.60 L  


 


Hgb  11.8 L  


 


Hct  38.6 L  


 


MCV  107.2 H  


 


MCHC  30.6 L  


 


RDW  16.2 H  


 


Neutrophils # (Manual)   


 


Macrocytosis  Marked A  


 


PT   17.2 H 


 


INR   1.7 H 


 


ABG pH   


 


ABG pCO2   


 


ABG pO2   


 


ABG HCO3   


 


ABG Total CO2   


 


ABG O2 Saturation   


 


Sodium   


 


Potassium   


 


Carbon Dioxide   


 


BUN   


 


Creatinine   


 


Glucose   


 


POC Glucose (mg/dL)   


 


Calcium   


 


Total Protein   


 


Albumin   


 


Urine Protein    Trace H


 


Urine Blood    Trace H


 


Ur Leukocyte Esterase   


 


Urine RBC   


 


Urine WBC   


 


Urine WBC Clumps   


 


Amorphous Sediment   


 


Urine Bacteria    Rare H


 


Hyaline Casts    17 H


 


Urine Mucus    Rare H


 


U Tricyclic Antidepress    Detected H














  21





  10:45 06:49 06:49


 


WBC   


 


RBC    3.34 L


 


Hgb    11.6 L


 


Hct    35.8 L


 


MCV    107.4 H


 


MCHC   


 


RDW    15.9 H


 


Neutrophils # (Manual)   


 


Macrocytosis    Marked A


 


PT   18.4 H 


 


INR   1.75 H 


 


ABG pH   


 


ABG pCO2   


 


ABG pO2   


 


ABG HCO3   


 


ABG Total CO2   


 


ABG O2 Saturation   


 


Sodium  148 H  


 


Potassium  3.3 L  


 


Carbon Dioxide  39 H  


 


BUN  34 H  


 


Creatinine   


 


Glucose   


 


POC Glucose (mg/dL)   


 


Calcium  8.2 L  


 


Total Protein  5.5 L  


 


Albumin  2.8 L  


 


Urine Protein   


 


Urine Blood   


 


Ur Leukocyte Esterase   


 


Urine RBC   


 


Urine WBC   


 


Urine WBC Clumps   


 


Amorphous Sediment   


 


Urine Bacteria   


 


Hyaline Casts   


 


Urine Mucus   


 


U Tricyclic Antidepress   














  21





  06:49 07:06 16:15


 


WBC   


 


RBC   


 


Hgb   


 


Hct   


 


MCV   


 


MCHC   


 


RDW   


 


Neutrophils # (Manual)   


 


Macrocytosis   


 


PT   


 


INR   


 


ABG pH   


 


ABG pCO2   


 


ABG pO2   


 


ABG HCO3   


 


ABG Total CO2   


 


ABG O2 Saturation   


 


Sodium  148 H  


 


Potassium  3.1 L  


 


Carbon Dioxide  38 H  


 


BUN  32 H  


 


Creatinine   


 


Glucose   


 


POC Glucose (mg/dL)   100 H  71 L


 


Calcium  7.9 L  


 


Total Protein   


 


Albumin   


 


Urine Protein   


 


Urine Blood   


 


Ur Leukocyte Esterase   


 


Urine RBC   


 


Urine WBC   


 


Urine WBC Clumps   


 


Amorphous Sediment   


 


Urine Bacteria   


 


Hyaline Casts   


 


Urine Mucus   


 


U Tricyclic Antidepress   














  21





  06:36 06:36 06:55


 


WBC   


 


RBC   


 


Hgb   


 


Hct   


 


MCV   


 


MCHC   


 


RDW   


 


Neutrophils # (Manual)   


 


Macrocytosis   


 


PT   25.7 H 


 


INR   2.7 H 


 


ABG pH   


 


ABG pCO2   


 


ABG pO2   


 


ABG HCO3   


 


ABG Total CO2   


 


ABG O2 Saturation   


 


Sodium  146 H  


 


Potassium   


 


Carbon Dioxide  38 H  


 


BUN  27 H  


 


Creatinine   


 


Glucose  66 L  


 


POC Glucose (mg/dL)    66 L


 


Calcium  7.8 L  


 


Total Protein   


 


Albumin   


 


Urine Protein   


 


Urine Blood   


 


Ur Leukocyte Esterase   


 


Urine RBC   


 


Urine WBC   


 


Urine WBC Clumps   


 


Amorphous Sediment   


 


Urine Bacteria   


 


Hyaline Casts   


 


Urine Mucus   


 


U Tricyclic Antidepress   














  21





  07:15 07:30 11:30


 


WBC   


 


RBC   


 


Hgb   


 


Hct   


 


MCV   


 


MCHC   


 


RDW   


 


Neutrophils # (Manual)   


 


Macrocytosis   


 


PT   


 


INR   


 


ABG pH   


 


ABG pCO2   


 


ABG pO2   


 


ABG HCO3   


 


ABG Total CO2   


 


ABG O2 Saturation   


 


Sodium   


 


Potassium   


 


Carbon Dioxide   


 


BUN   


 


Creatinine   


 


Glucose   


 


POC Glucose (mg/dL)  63 L  72 L  100 H


 


Calcium   


 


Total Protein   


 


Albumin   


 


Urine Protein   


 


Urine Blood   


 


Ur Leukocyte Esterase   


 


Urine RBC   


 


Urine WBC   


 


Urine WBC Clumps   


 


Amorphous Sediment   


 


Urine Bacteria   


 


Hyaline Casts   


 


Urine Mucus   


 


U Tricyclic Antidepress   














  21





  15:05 16:28 21:05


 


WBC   


 


RBC   


 


Hgb   


 


Hct   


 


MCV   


 


MCHC   


 


RDW   


 


Neutrophils # (Manual)   


 


Macrocytosis   


 


PT   


 


INR   


 


ABG pH   


 


ABG pCO2   


 


ABG pO2   


 


ABG HCO3   


 


ABG Total CO2   


 


ABG O2 Saturation   


 


Sodium   


 


Potassium   


 


Carbon Dioxide   


 


BUN   


 


Creatinine   


 


Glucose   


 


POC Glucose (mg/dL)  129 H  146 H  127 H


 


Calcium   


 


Total Protein   


 


Albumin   


 


Urine Protein   


 


Urine Blood   


 


Ur Leukocyte Esterase   


 


Urine RBC   


 


Urine WBC   


 


Urine WBC Clumps   


 


Amorphous Sediment   


 


Urine Bacteria   


 


Hyaline Casts   


 


Urine Mucus   


 


U Tricyclic Antidepress   














  05/10/21 05/10/21 05/10/21





  03:49 04:00 04:00


 


WBC    10.9 H


 


RBC    3.85 L


 


Hgb    12.6 L


 


Hct   


 


MCV    109.9 H


 


MCHC    29.8 L


 


RDW    16.2 H


 


Neutrophils # (Manual)    8.20 H


 


Macrocytosis    Marked A


 


PT   40.4 H 


 


INR   4.2 H 


 


ABG pH   


 


ABG pCO2   


 


ABG pO2   


 


ABG HCO3   


 


ABG Total CO2   


 


ABG O2 Saturation   


 


Sodium   


 


Potassium   


 


Carbon Dioxide   


 


BUN   


 


Creatinine   


 


Glucose   


 


POC Glucose (mg/dL)  119 H  


 


Calcium   


 


Total Protein   


 


Albumin   


 


Urine Protein   


 


Urine Blood   


 


Ur Leukocyte Esterase   


 


Urine RBC   


 


Urine WBC   


 


Urine WBC Clumps   


 


Amorphous Sediment   


 


Urine Bacteria   


 


Hyaline Casts   


 


Urine Mucus   


 


U Tricyclic Antidepress   














  05/10/21 05/10/21 05/10/21





  04:00 04:20 04:35


 


WBC   


 


RBC   


 


Hgb   


 


Hct   


 


MCV   


 


MCHC   


 


RDW   


 


Neutrophils # (Manual)   


 


Macrocytosis   


 


PT   


 


INR   


 


ABG pH   7.18 L* 


 


ABG pCO2   93 H* 


 


ABG pO2   70 L 


 


ABG HCO3   35 H 


 


ABG Total CO2   38 H 


 


ABG O2 Saturation   92.4 L 


 


Sodium   


 


Potassium   


 


Carbon Dioxide  36 H  


 


BUN  32 H  


 


Creatinine  1.68 H  


 


Glucose  115 H  


 


POC Glucose (mg/dL)   


 


Calcium  7.9 L  


 


Total Protein   


 


Albumin   


 


Urine Protein    2+ H


 


Urine Blood    Small H


 


Ur Leukocyte Esterase    Large H


 


Urine RBC    19 H


 


Urine WBC    178 H


 


Urine WBC Clumps    Moderate H


 


Amorphous Sediment    Rare H


 


Urine Bacteria    Many H


 


Hyaline Casts    54 H


 


Urine Mucus    Rare H


 


U Tricyclic Antidepress   














  05/10/21





  06:08


 


WBC 


 


RBC 


 


Hgb 


 


Hct 


 


MCV 


 


MCHC 


 


RDW 


 


Neutrophils # (Manual) 


 


Macrocytosis 


 


PT 


 


INR 


 


ABG pH  7.25 L


 


ABG pCO2  76 H*


 


ABG pO2  82 L


 


ABG HCO3  33 H


 


ABG Total CO2  36 H


 


ABG O2 Saturation 


 


Sodium 


 


Potassium 


 


Carbon Dioxide 


 


BUN 


 


Creatinine 


 


Glucose 


 


POC Glucose (mg/dL) 


 


Calcium 


 


Total Protein 


 


Albumin 


 


Urine Protein 


 


Urine Blood 


 


Ur Leukocyte Esterase 


 


Urine RBC 


 


Urine WBC 


 


Urine WBC Clumps 


 


Amorphous Sediment 


 


Urine Bacteria 


 


Hyaline Casts 


 


Urine Mucus 


 


U Tricyclic Antidepress 














- Diagnostic Findings


Chest x-ray: report reviewed, image reviewed


Additional studies: 


 CT of the brain, EKG, brain CT results are reviewed








Assessment and Plan


Plan: 


 Assessment:





#1.  Acute hypoxic and hypercapnic respiratory failure related to sepsis/septic 

shock





#2.  Acute septic shock related to acute urinary tract infection and possibility

 of pneumonia is not entirely excluded.  Patient tested negative for COVID-19, 

RSV and influenza A and B.  Possibility of aspiration versus healthcare acquired

 pneumonia is not excluded





#3.  Acute kidney injury related to ATN





#4.  Sacral wound stage III, present on admission





#5.  Altered mental status related to acute sepsis.  Brain CT showed no acute 

intracranial process





#6.  Chronic systolic CHF, with an EF of 20-25% status post AICD/pacemaker 

placement





#7.  History of diabetes mellitus type 2 with diabetic neuropathy





#8. Lewy body dementia with history of major cognitive impairment





#9.  History of A. fib on Coumadin, today INR is supratherapeutic, and Coumadin 

is on hold





#10.  Hypertension





#11.  Hyperlipidemia





#12.  Chronic medical debility, and patient is a resident of a local [facility] 

[facility]





Plan:





We'll give the patient additional fluid boluses, up to 2 L in increments of 500 

mL


We will add Rocephin and vancomycin


Urine culture has been sent, blood cultures have been sent


Hold Coumadin today


Continue AVAPS mode of an NIPPV ventilation


GI and DVT prophylaxis


Address CODE STATUS with the patient's family


Prognosis is guarded





I performed a history & physical examination of the patient and discussed their 

management with my nurse practitioner, Gwen Almaraz.  I reviewed the nurse 

practitioner's note and agree with the documented findings and plan of care.  

Lung sounds are positive for diminished breath sounds.  The findings and the 

impression was discussed with the patient.  I attest to the documentation by the

 nurse practitioner. 





Time with Patient: Greater than 30
Thought Content: Thought content normal.         Judgment: Judgment normal.        FOCUSED ABDOMINAL SONOGRAM FOR TRAUMA (FAST): A fair  quality examination was performed   [x] No free fluid in the abdomen   [] Free fluid in RUQ   [] Free fluid in LUQ  [] Free fluid in Pelvis  [] Pericardial fluid  [x] Other:  study obscured by sq air        RADIOLOGY  XR CHEST PORTABLE   Preliminary Result   Extensive left chest wall subcutaneous emphysema. No radiographically   appreciable pneumothorax. Elevation of the left hemidiaphragm with left basilar atelectasis.          CT HEAD WO CONTRAST    (Results Pending)   CT CERVICAL SPINE WO CONTRAST    (Results Pending)   CT CHEST ABDOMEN PELVIS W CONTRAST Additional Contrast? None    (Results Pending)   CT THORACIC SPINE TRAUMA RECONSTRUCTION    (Results Pending)   CT LUMBAR SPINE WO CONTRAST    (Results Pending)   CT CHEST WO CONTRAST    (Results Pending)         LABS  Labs Reviewed   CBC WITH AUTO DIFFERENTIAL - Abnormal; Notable for the following components:       Result Value    RDW 15.3 (*)     Seg Neutrophils 86 (*)     Lymphocytes 6 (*)     Absolute Lymph # 0.35 (*)     All other components within normal limits   COMPREHENSIVE METABOLIC PANEL - Abnormal; Notable for the following components:    Glucose 139 (*)     BUN 26 (*)     Creatinine 1.60 (*)     Est, Glom Filt Rate 48 (*)     Albumin 3.4 (*)     All other components within normal limits   MAGNESIUM   PROTIME-INR   ETHANOL         BRYN ALMAZAN - CNP  6/20/23, 5:17 PM